# Patient Record
Sex: MALE | Race: WHITE | NOT HISPANIC OR LATINO | Employment: UNEMPLOYED | URBAN - METROPOLITAN AREA
[De-identification: names, ages, dates, MRNs, and addresses within clinical notes are randomized per-mention and may not be internally consistent; named-entity substitution may affect disease eponyms.]

---

## 2021-05-24 ENCOUNTER — ANESTHESIA (OUTPATIENT)
Dept: SURGERY | Facility: MEDICAL CENTER | Age: 51
DRG: 003 | End: 2021-05-24
Payer: MEDICAID

## 2021-05-24 ENCOUNTER — APPOINTMENT (OUTPATIENT)
Dept: RADIOLOGY | Facility: MEDICAL CENTER | Age: 51
DRG: 003 | End: 2021-05-24
Attending: EMERGENCY MEDICINE
Payer: MEDICAID

## 2021-05-24 ENCOUNTER — ANESTHESIA EVENT (OUTPATIENT)
Dept: SURGERY | Facility: MEDICAL CENTER | Age: 51
DRG: 003 | End: 2021-05-24
Payer: MEDICAID

## 2021-05-24 ENCOUNTER — HOSPITAL ENCOUNTER (INPATIENT)
Facility: MEDICAL CENTER | Age: 51
LOS: 38 days | DRG: 003 | End: 2021-07-01
Attending: EMERGENCY MEDICINE | Admitting: SURGERY
Payer: MEDICAID

## 2021-05-24 DIAGNOSIS — R13.12 OROPHARYNGEAL DYSPHAGIA: ICD-10-CM

## 2021-05-24 DIAGNOSIS — S02.92XB: ICD-10-CM

## 2021-05-24 DIAGNOSIS — S01.83XA GUNSHOT WOUND OF FACE, INITIAL ENCOUNTER: ICD-10-CM

## 2021-05-24 PROBLEM — J96.90 RESPIRATORY FAILURE FOLLOWING TRAUMA (HCC): Status: ACTIVE | Noted: 2021-05-24

## 2021-05-24 PROBLEM — R58 HYPOTENSION DUE TO BLOOD LOSS: Status: ACTIVE | Noted: 2021-05-24

## 2021-05-24 PROBLEM — Z53.09 CONTRAINDICATION TO DEEP VEIN THROMBOSIS (DVT) PROPHYLAXIS: Status: ACTIVE | Noted: 2021-05-24

## 2021-05-24 PROBLEM — T14.90XA TRAUMA: Status: ACTIVE | Noted: 2021-05-24

## 2021-05-24 PROBLEM — I95.89 HYPOTENSION DUE TO BLOOD LOSS: Status: ACTIVE | Noted: 2021-05-24
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
## 2021-05-24 PROBLEM — F10.929 ACUTE ALCOHOL INTOXICATION (HCC): Status: ACTIVE | Noted: 2021-05-24

## 2021-05-24 PROBLEM — R79.89 ELEVATED LACTIC ACID LEVEL: Status: ACTIVE | Noted: 2021-05-24

## 2021-05-24 PROBLEM — Z11.9 SCREENING EXAMINATION FOR INFECTIOUS DISEASE: Status: ACTIVE | Noted: 2021-05-24

## 2021-05-24 LAB
ABO + RH BLD: NORMAL
ABO GROUP BLD: NORMAL
ALBUMIN SERPL BCP-MCNC: 3.5 G/DL (ref 3.2–4.9)
ALBUMIN/GLOB SERPL: 1.7 G/DL
ALP SERPL-CCNC: 57 U/L (ref 30–99)
ALT SERPL-CCNC: 25 U/L (ref 2–50)
ANION GAP SERPL CALC-SCNC: 19 MMOL/L (ref 7–16)
APTT PPP: 23.7 SEC (ref 24.7–36)
AST SERPL-CCNC: 77 U/L (ref 12–45)
BARCODED ABORH UBTYP: 2800
BARCODED ABORH UBTYP: 2800
BARCODED ABORH UBTYP: 5100
BARCODED ABORH UBTYP: 6200
BARCODED ABORH UBTYP: 8400
BARCODED ABORH UBTYP: 8400
BARCODED PRD CODE UBPRD: NORMAL
BARCODED UNIT NUM UBUNT: NORMAL
BASE EXCESS BLDA CALC-SCNC: -10 MMOL/L (ref -4–3)
BASE EXCESS BLDA CALC-SCNC: -15 MMOL/L (ref -4–3)
BILIRUB SERPL-MCNC: 0.7 MG/DL (ref 0.1–1.5)
BLD GP AB SCN SERPL QL: NORMAL
BODY TEMPERATURE: ABNORMAL CENTIGRADE
BODY TEMPERATURE: ABNORMAL DEGREES
BREATHS SETTING VENT: 24
BUN SERPL-MCNC: 10 MG/DL (ref 8–22)
CA-I BLD ISE-SCNC: 1.07 MMOL/L (ref 1.1–1.3)
CALCIUM SERPL-MCNC: 8.1 MG/DL (ref 8.5–10.5)
CFT BLD TEG: 4.2 MIN (ref 5–10)
CHLORIDE SERPL-SCNC: 100 MMOL/L (ref 96–112)
CLOT ANGLE BLD TEG: 60.7 DEGREES (ref 53–72)
CLOT LYSIS 30M P MA LENFR BLD TEG: 0 % (ref 0–8)
CO2 BLDA-SCNC: 18 MMOL/L (ref 20–33)
CO2 SERPL-SCNC: 20 MMOL/L (ref 20–33)
COMPONENT F 8504F: NORMAL
COMPONENT P 8504P: NORMAL
COMPONENT R 8504R: NORMAL
CREAT SERPL-MCNC: 1.7 MG/DL (ref 0.5–1.4)
CT.EXTRINSIC BLD ROTEM: 2.4 MIN (ref 1–3)
DELSYS IDSYS: ABNORMAL
END TIDAL CARBON DIOXIDE IECO2: 30 MMHG
ERYTHROCYTE [DISTWIDTH] IN BLOOD BY AUTOMATED COUNT: 49.2 FL (ref 35.9–50)
ETHANOL BLD-MCNC: 235.6 MG/DL (ref 0–10)
GLOBULIN SER CALC-MCNC: 2.1 G/DL (ref 1.9–3.5)
GLUCOSE BLD-MCNC: 119 MG/DL (ref 65–99)
GLUCOSE BLD-MCNC: 120 MG/DL (ref 65–99)
GLUCOSE SERPL-MCNC: 260 MG/DL (ref 65–99)
HCO3 BLDA-SCNC: 13 MMOL/L (ref 17–25)
HCO3 BLDA-SCNC: 16.5 MMOL/L (ref 17–25)
HCT VFR BLD AUTO: 42.6 % (ref 42–52)
HCT VFR BLD CALC: 44 % (ref 42–52)
HGB BLD-MCNC: 13.7 G/DL (ref 14–18)
HGB BLD-MCNC: 15 G/DL (ref 14–18)
HOROWITZ INDEX BLDA+IHG-RTO: 240 MM[HG]
INR PPP: 1.09 (ref 0.87–1.13)
LACTATE BLD-SCNC: 12.3 MMOL/L (ref 0.5–2)
LACTATE BLD-SCNC: 4.7 MMOL/L (ref 0.5–2)
MCF BLD TEG: 60.5 MM (ref 50–70)
MCH RBC QN AUTO: 32.4 PG (ref 27–33)
MCHC RBC AUTO-ENTMCNC: 32.2 G/DL (ref 33.7–35.3)
MCV RBC AUTO: 100.7 FL (ref 81.4–97.8)
MODE IMODE: ABNORMAL
O2/TOTAL GAS SETTING VFR VENT: 50 %
PA AA BLD-ACNC: 12.4 %
PA ADP BLD-ACNC: 88.8 %
PCO2 BLDA: 35.7 MMHG (ref 26–37)
PCO2 BLDA: 38.1 MMHG (ref 26–37)
PCO2 TEMP ADJ BLDA: 35.2 MMHG (ref 26–37)
PEEP END EXPIRATORY PRESSURE IPEEP: 10 CMH20
PH BLDA: 7.17 [PH] (ref 7.4–7.5)
PH BLDA: 7.25 [PH] (ref 7.4–7.5)
PH TEMP ADJ BLDA: 7.27 [PH] (ref 7.4–7.5)
PLATELET # BLD AUTO: 247 K/UL (ref 164–446)
PMV BLD AUTO: 10.4 FL (ref 9–12.9)
PO2 BLDA: 120 MMHG (ref 64–87)
PO2 BLDA: 378.7 MMHG (ref 64–87)
PO2 TEMP ADJ BLDA: 110 MMHG (ref 64–87)
POTASSIUM BLD-SCNC: 4 MMOL/L (ref 3.6–5.5)
POTASSIUM SERPL-SCNC: 3.3 MMOL/L (ref 3.6–5.5)
PRODUCT TYPE UPROD: NORMAL
PROT SERPL-MCNC: 5.6 G/DL (ref 6–8.2)
PROTHROMBIN TIME: 14.5 SEC (ref 12–14.6)
RBC # BLD AUTO: 4.23 M/UL (ref 4.7–6.1)
RH BLD: NORMAL
SAO2 % BLDA: 98 % (ref 93–99)
SAO2 % BLDA: 98.5 % (ref 93–99)
SODIUM BLD-SCNC: 141 MMOL/L (ref 135–145)
SODIUM SERPL-SCNC: 139 MMOL/L (ref 135–145)
SPECIMEN DRAWN FROM PATIENT: ABNORMAL
TEG ALGORITHM TGALG: ABNORMAL
TIDAL VOLUME IVT: 500 ML
UNIT STATUS USTAT: NORMAL
WBC # BLD AUTO: 29.5 K/UL (ref 4.8–10.8)

## 2021-05-24 PROCEDURE — 85018 HEMOGLOBIN: CPT

## 2021-05-24 PROCEDURE — 0B113F4 BYPASS TRACHEA TO CUTANEOUS WITH TRACHEOSTOMY DEVICE, PERCUTANEOUS APPROACH: ICD-10-PCS | Performed by: SURGERY

## 2021-05-24 PROCEDURE — P9016 RBC LEUKOCYTES REDUCED: HCPCS

## 2021-05-24 PROCEDURE — 160009 HCHG ANES TIME/MIN: Performed by: PLASTIC SURGERY

## 2021-05-24 PROCEDURE — 302214 INTUBATION BOX: Performed by: SURGERY

## 2021-05-24 PROCEDURE — 500371 HCHG DRAIN, BLAKE 10MM: Performed by: PLASTIC SURGERY

## 2021-05-24 PROCEDURE — 0NST04Z REPOSITION RIGHT MANDIBLE WITH INTERNAL FIXATION DEVICE, OPEN APPROACH: ICD-10-PCS | Performed by: PLASTIC SURGERY

## 2021-05-24 PROCEDURE — 92950 HEART/LUNG RESUSCITATION CPR: CPT

## 2021-05-24 PROCEDURE — 31603 EMER TRACHEOSTOMY TTRACH: CPT

## 2021-05-24 PROCEDURE — 36430 TRANSFUSION BLD/BLD COMPNT: CPT

## 2021-05-24 PROCEDURE — 0NSV04Z REPOSITION LEFT MANDIBLE WITH INTERNAL FIXATION DEVICE, OPEN APPROACH: ICD-10-PCS | Performed by: PLASTIC SURGERY

## 2021-05-24 PROCEDURE — 700105 HCHG RX REV CODE 258: Performed by: EMERGENCY MEDICINE

## 2021-05-24 PROCEDURE — 501838 HCHG SUTURE GENERAL: Performed by: PLASTIC SURGERY

## 2021-05-24 PROCEDURE — 70498 CT ANGIOGRAPHY NECK: CPT

## 2021-05-24 PROCEDURE — 84132 ASSAY OF SERUM POTASSIUM: CPT

## 2021-05-24 PROCEDURE — 700111 HCHG RX REV CODE 636 W/ 250 OVERRIDE (IP): Performed by: SURGERY

## 2021-05-24 PROCEDURE — 160048 HCHG OR STATISTICAL LEVEL 1-5: Performed by: PLASTIC SURGERY

## 2021-05-24 PROCEDURE — 94003 VENT MGMT INPAT SUBQ DAY: CPT

## 2021-05-24 PROCEDURE — 83605 ASSAY OF LACTIC ACID: CPT

## 2021-05-24 PROCEDURE — 86900 BLOOD TYPING SEROLOGIC ABO: CPT

## 2021-05-24 PROCEDURE — 30233K1 TRANSFUSION OF NONAUTOLOGOUS FROZEN PLASMA INTO PERIPHERAL VEIN, PERCUTANEOUS APPROACH: ICD-10-PCS | Performed by: SURGERY

## 2021-05-24 PROCEDURE — 86850 RBC ANTIBODY SCREEN: CPT

## 2021-05-24 PROCEDURE — 700111 HCHG RX REV CODE 636 W/ 250 OVERRIDE (IP)

## 2021-05-24 PROCEDURE — 72125 CT NECK SPINE W/O DYE: CPT

## 2021-05-24 PROCEDURE — 82803 BLOOD GASES ANY COMBINATION: CPT

## 2021-05-24 PROCEDURE — 86901 BLOOD TYPING SEROLOGIC RH(D): CPT

## 2021-05-24 PROCEDURE — 770022 HCHG ROOM/CARE - ICU (200)

## 2021-05-24 PROCEDURE — G0390 TRAUMA RESPONS W/HOSP CRITI: HCPCS

## 2021-05-24 PROCEDURE — 85384 FIBRINOGEN ACTIVITY: CPT

## 2021-05-24 PROCEDURE — 5A1955Z RESPIRATORY VENTILATION, GREATER THAN 96 CONSECUTIVE HOURS: ICD-10-PCS | Performed by: SURGERY

## 2021-05-24 PROCEDURE — U0005 INFEC AGEN DETEC AMPLI PROBE: HCPCS

## 2021-05-24 PROCEDURE — 501835 HCHG SUTURE PLASTIC: Performed by: PLASTIC SURGERY

## 2021-05-24 PROCEDURE — 700105 HCHG RX REV CODE 258: Performed by: SURGERY

## 2021-05-24 PROCEDURE — 70450 CT HEAD/BRAIN W/O DYE: CPT

## 2021-05-24 PROCEDURE — 80053 COMPREHEN METABOLIC PANEL: CPT

## 2021-05-24 PROCEDURE — 90471 IMMUNIZATION ADMIN: CPT

## 2021-05-24 PROCEDURE — 3E0234Z INTRODUCTION OF SERUM, TOXOID AND VACCINE INTO MUSCLE, PERCUTANEOUS APPROACH: ICD-10-PCS | Performed by: SURGERY

## 2021-05-24 PROCEDURE — 70486 CT MAXILLOFACIAL W/O DYE: CPT

## 2021-05-24 PROCEDURE — P9017 PLASMA 1 DONOR FRZ W/IN 8 HR: HCPCS

## 2021-05-24 PROCEDURE — 82077 ASSAY SPEC XCP UR&BREATH IA: CPT

## 2021-05-24 PROCEDURE — 84295 ASSAY OF SERUM SODIUM: CPT

## 2021-05-24 PROCEDURE — 82330 ASSAY OF CALCIUM: CPT

## 2021-05-24 PROCEDURE — 700101 HCHG RX REV CODE 250: Performed by: EMERGENCY MEDICINE

## 2021-05-24 PROCEDURE — 90715 TDAP VACCINE 7 YRS/> IM: CPT

## 2021-05-24 PROCEDURE — 160027 HCHG SURGERY MINUTES - 1ST 30 MINS LEVEL 2: Performed by: PLASTIC SURGERY

## 2021-05-24 PROCEDURE — 94002 VENT MGMT INPAT INIT DAY: CPT

## 2021-05-24 PROCEDURE — 85610 PROTHROMBIN TIME: CPT

## 2021-05-24 PROCEDURE — 85027 COMPLETE CBC AUTOMATED: CPT

## 2021-05-24 PROCEDURE — 700117 HCHG RX CONTRAST REV CODE 255: Performed by: EMERGENCY MEDICINE

## 2021-05-24 PROCEDURE — 71045 X-RAY EXAM CHEST 1 VIEW: CPT

## 2021-05-24 PROCEDURE — 82962 GLUCOSE BLOOD TEST: CPT | Mod: 91

## 2021-05-24 PROCEDURE — 160038 HCHG SURGERY MINUTES - EA ADDL 1 MIN LEVEL 2: Performed by: PLASTIC SURGERY

## 2021-05-24 PROCEDURE — 0NCQ0ZZ EXTIRPATION OF MATTER FROM LEFT ORBIT, OPEN APPROACH: ICD-10-PCS | Performed by: PLASTIC SURGERY

## 2021-05-24 PROCEDURE — 85014 HEMATOCRIT: CPT

## 2021-05-24 PROCEDURE — 85730 THROMBOPLASTIN TIME PARTIAL: CPT

## 2021-05-24 PROCEDURE — 96375 TX/PRO/DX INJ NEW DRUG ADDON: CPT

## 2021-05-24 PROCEDURE — 85576 BLOOD PLATELET AGGREGATION: CPT | Mod: 91

## 2021-05-24 PROCEDURE — 700105 HCHG RX REV CODE 258: Performed by: STUDENT IN AN ORGANIZED HEALTH CARE EDUCATION/TRAINING PROGRAM

## 2021-05-24 PROCEDURE — 700111 HCHG RX REV CODE 636 W/ 250 OVERRIDE (IP): Performed by: STUDENT IN AN ORGANIZED HEALTH CARE EDUCATION/TRAINING PROGRAM

## 2021-05-24 PROCEDURE — 30233R1 TRANSFUSION OF NONAUTOLOGOUS PLATELETS INTO PERIPHERAL VEIN, PERCUTANEOUS APPROACH: ICD-10-PCS | Performed by: SURGERY

## 2021-05-24 PROCEDURE — 99291 CRITICAL CARE FIRST HOUR: CPT

## 2021-05-24 PROCEDURE — 37799 UNLISTED PX VASCULAR SURGERY: CPT

## 2021-05-24 PROCEDURE — P9034 PLATELETS, PHERESIS: HCPCS

## 2021-05-24 PROCEDURE — 30233N1 TRANSFUSION OF NONAUTOLOGOUS RED BLOOD CELLS INTO PERIPHERAL VEIN, PERCUTANEOUS APPROACH: ICD-10-PCS | Performed by: SURGERY

## 2021-05-24 PROCEDURE — U0003 INFECTIOUS AGENT DETECTION BY NUCLEIC ACID (DNA OR RNA); SEVERE ACUTE RESPIRATORY SYNDROME CORONAVIRUS 2 (SARS-COV-2) (CORONAVIRUS DISEASE [COVID-19]), AMPLIFIED PROBE TECHNIQUE, MAKING USE OF HIGH THROUGHPUT TECHNOLOGIES AS DESCRIBED BY CMS-2020-01-R: HCPCS

## 2021-05-24 PROCEDURE — 96374 THER/PROPH/DIAG INJ IV PUSH: CPT

## 2021-05-24 PROCEDURE — C1713 ANCHOR/SCREW BN/BN,TIS/BN: HCPCS | Performed by: PLASTIC SURGERY

## 2021-05-24 PROCEDURE — 85347 COAGULATION TIME ACTIVATED: CPT

## 2021-05-24 PROCEDURE — 306637 HCHG MISC ORTHO ITEM RC 0274

## 2021-05-24 DEVICE — IMPLANTABLE DEVICE: Type: IMPLANTABLE DEVICE | Site: FACE | Status: FUNCTIONAL

## 2021-05-24 RX ORDER — ENEMA 19; 7 G/133ML; G/133ML
1 ENEMA RECTAL
Status: DISCONTINUED | OUTPATIENT
Start: 2021-05-24 | End: 2021-06-23

## 2021-05-24 RX ORDER — FAMOTIDINE 20 MG/1
20 TABLET, FILM COATED ORAL 2 TIMES DAILY
Status: DISCONTINUED | OUTPATIENT
Start: 2021-05-24 | End: 2021-05-31

## 2021-05-24 RX ORDER — OXYCODONE HYDROCHLORIDE 10 MG/1
10 TABLET ORAL
Status: DISCONTINUED | OUTPATIENT
Start: 2021-05-24 | End: 2021-06-27

## 2021-05-24 RX ORDER — SODIUM CHLORIDE, SODIUM LACTATE, POTASSIUM CHLORIDE, CALCIUM CHLORIDE 600; 310; 30; 20 MG/100ML; MG/100ML; MG/100ML; MG/100ML
INJECTION, SOLUTION INTRAVENOUS CONTINUOUS
Status: DISCONTINUED | OUTPATIENT
Start: 2021-05-24 | End: 2021-05-29

## 2021-05-24 RX ORDER — AMPICILLIN AND SULBACTAM 2; 1 G/1; G/1
INJECTION, POWDER, FOR SOLUTION INTRAMUSCULAR; INTRAVENOUS
Status: DISPENSED
Start: 2021-05-24 | End: 2021-05-25

## 2021-05-24 RX ORDER — ONDANSETRON 2 MG/ML
4 INJECTION INTRAMUSCULAR; INTRAVENOUS EVERY 4 HOURS PRN
Status: DISCONTINUED | OUTPATIENT
Start: 2021-05-24 | End: 2021-06-23

## 2021-05-24 RX ORDER — BISACODYL 10 MG
10 SUPPOSITORY, RECTAL RECTAL
Status: DISCONTINUED | OUTPATIENT
Start: 2021-05-24 | End: 2021-06-23

## 2021-05-24 RX ORDER — KETAMINE HYDROCHLORIDE 50 MG/ML
INJECTION, SOLUTION INTRAMUSCULAR; INTRAVENOUS
Status: COMPLETED | OUTPATIENT
Start: 2021-05-24 | End: 2021-05-24

## 2021-05-24 RX ORDER — FAMOTIDINE 20 MG/1
20 TABLET, FILM COATED ORAL 2 TIMES DAILY
Status: DISCONTINUED | OUTPATIENT
Start: 2021-05-24 | End: 2021-05-24

## 2021-05-24 RX ORDER — SODIUM CHLORIDE 9 MG/ML
INJECTION, SOLUTION INTRAVENOUS
Status: DISPENSED
Start: 2021-05-24 | End: 2021-05-25

## 2021-05-24 RX ORDER — AMOXICILLIN 250 MG
1 CAPSULE ORAL
Status: DISCONTINUED | OUTPATIENT
Start: 2021-05-24 | End: 2021-06-23

## 2021-05-24 RX ORDER — MIDAZOLAM HYDROCHLORIDE 1 MG/ML
INJECTION INTRAMUSCULAR; INTRAVENOUS PRN
Status: DISCONTINUED | OUTPATIENT
Start: 2021-05-24 | End: 2021-05-24 | Stop reason: SURG

## 2021-05-24 RX ORDER — POLYETHYLENE GLYCOL 3350 17 G/17G
1 POWDER, FOR SOLUTION ORAL 2 TIMES DAILY
Status: DISCONTINUED | OUTPATIENT
Start: 2021-05-24 | End: 2021-07-01 | Stop reason: HOSPADM

## 2021-05-24 RX ORDER — DEXAMETHASONE SODIUM PHOSPHATE 4 MG/ML
INJECTION, SOLUTION INTRA-ARTICULAR; INTRALESIONAL; INTRAMUSCULAR; INTRAVENOUS; SOFT TISSUE PRN
Status: DISCONTINUED | OUTPATIENT
Start: 2021-05-24 | End: 2021-05-24 | Stop reason: SURG

## 2021-05-24 RX ORDER — VECURONIUM BROMIDE 1 MG/ML
INJECTION, POWDER, LYOPHILIZED, FOR SOLUTION INTRAVENOUS
Status: COMPLETED | OUTPATIENT
Start: 2021-05-24 | End: 2021-05-24

## 2021-05-24 RX ORDER — CEFAZOLIN SODIUM 1 G/3ML
INJECTION, POWDER, FOR SOLUTION INTRAMUSCULAR; INTRAVENOUS PRN
Status: DISCONTINUED | OUTPATIENT
Start: 2021-05-24 | End: 2021-05-24 | Stop reason: SURG

## 2021-05-24 RX ORDER — AMOXICILLIN 250 MG
1 CAPSULE ORAL NIGHTLY
Status: DISCONTINUED | OUTPATIENT
Start: 2021-05-24 | End: 2021-07-01 | Stop reason: HOSPADM

## 2021-05-24 RX ORDER — SODIUM CHLORIDE 9 MG/ML
INJECTION, SOLUTION INTRAVENOUS
Status: COMPLETED | OUTPATIENT
Start: 2021-05-24 | End: 2021-05-24

## 2021-05-24 RX ORDER — OXYCODONE HYDROCHLORIDE 5 MG/1
5 TABLET ORAL
Status: DISCONTINUED | OUTPATIENT
Start: 2021-05-24 | End: 2021-06-27

## 2021-05-24 RX ORDER — MORPHINE SULFATE 4 MG/ML
4 INJECTION, SOLUTION INTRAMUSCULAR; INTRAVENOUS
Status: DISCONTINUED | OUTPATIENT
Start: 2021-05-24 | End: 2021-05-31

## 2021-05-24 RX ORDER — PHENYLEPHRINE HYDROCHLORIDE 10 MG/ML
INJECTION, SOLUTION INTRAMUSCULAR; INTRAVENOUS; SUBCUTANEOUS PRN
Status: DISCONTINUED | OUTPATIENT
Start: 2021-05-24 | End: 2021-05-24 | Stop reason: SURG

## 2021-05-24 RX ORDER — DOCUSATE SODIUM 50 MG/5ML
100 LIQUID ORAL 2 TIMES DAILY
Status: DISCONTINUED | OUTPATIENT
Start: 2021-05-25 | End: 2021-07-01 | Stop reason: HOSPADM

## 2021-05-24 RX ORDER — DOCUSATE SODIUM 100 MG/1
100 CAPSULE, LIQUID FILLED ORAL 2 TIMES DAILY
Status: DISCONTINUED | OUTPATIENT
Start: 2021-05-24 | End: 2021-05-24

## 2021-05-24 RX ADMIN — SODIUM CHLORIDE 1000 ML: 9 INJECTION, SOLUTION INTRAVENOUS at 17:32

## 2021-05-24 RX ADMIN — SODIUM CHLORIDE, POTASSIUM CHLORIDE, SODIUM LACTATE AND CALCIUM CHLORIDE: 600; 310; 30; 20 INJECTION, SOLUTION INTRAVENOUS at 20:07

## 2021-05-24 RX ADMIN — KETAMINE HYDROCHLORIDE 100 MG: 50 INJECTION INTRAMUSCULAR; INTRAVENOUS at 17:27

## 2021-05-24 RX ADMIN — CLOSTRIDIUM TETANI TOXOID ANTIGEN (FORMALDEHYDE INACTIVATED), CORYNEBACTERIUM DIPHTHERIAE TOXOID ANTIGEN (FORMALDEHYDE INACTIVATED), BORDETELLA PERTUSSIS TOXOID ANTIGEN (GLUTARALDEHYDE INACTIVATED), BORDETELLA PERTUSSIS FILAMENTOUS HEMAGGLUTININ ANTIGEN (FORMALDEHYDE INACTIVATED), BORDETELLA PERTUSSIS PERTACTIN ANTIGEN, AND BORDETELLA PERTUSSIS FIMBRIAE 2/3 ANTIGEN 1 ML: 5; 2; 2.5; 5; 3; 5 INJECTION, SUSPENSION INTRAMUSCULAR at 17:50

## 2021-05-24 RX ADMIN — IOHEXOL 80 ML: 350 INJECTION, SOLUTION INTRAVENOUS at 18:03

## 2021-05-24 RX ADMIN — CEFAZOLIN 2 G: 330 INJECTION, POWDER, FOR SOLUTION INTRAMUSCULAR; INTRAVENOUS at 18:40

## 2021-05-24 RX ADMIN — MIDAZOLAM HYDROCHLORIDE 2 MG: 1 INJECTION, SOLUTION INTRAMUSCULAR; INTRAVENOUS at 18:38

## 2021-05-24 RX ADMIN — SODIUM CHLORIDE 3 G: 900 INJECTION INTRAVENOUS at 17:42

## 2021-05-24 RX ADMIN — DEXAMETHASONE SODIUM PHOSPHATE 10 MG: 4 INJECTION, SOLUTION INTRA-ARTICULAR; INTRALESIONAL; INTRAMUSCULAR; INTRAVENOUS; SOFT TISSUE at 18:40

## 2021-05-24 RX ADMIN — SODIUM CHLORIDE, POTASSIUM CHLORIDE, SODIUM LACTATE AND CALCIUM CHLORIDE: 600; 310; 30; 20 INJECTION, SOLUTION INTRAVENOUS at 18:42

## 2021-05-24 RX ADMIN — PHENYLEPHRINE HYDROCHLORIDE 25 MCG/MIN: 10 INJECTION INTRAVENOUS at 18:54

## 2021-05-24 RX ADMIN — FENTANYL CITRATE 100 MCG: 50 INJECTION, SOLUTION INTRAMUSCULAR; INTRAVENOUS at 18:40

## 2021-05-24 RX ADMIN — PHENYLEPHRINE HYDROCHLORIDE 200 MCG: 10 INJECTION INTRAVENOUS at 18:54

## 2021-05-24 RX ADMIN — PROPOFOL 10 MCG/KG/MIN: 10 INJECTION, EMULSION INTRAVENOUS at 21:40

## 2021-05-24 RX ADMIN — KETAMINE HYDROCHLORIDE 100 MG: 50 INJECTION INTRAMUSCULAR; INTRAVENOUS at 17:35

## 2021-05-24 RX ADMIN — FAMOTIDINE 20 MG: 10 INJECTION INTRAVENOUS at 21:29

## 2021-05-24 RX ADMIN — VECURONIUM BROMIDE 2 MG: 10 INJECTION, POWDER, LYOPHILIZED, FOR SOLUTION INTRAVENOUS at 17:31

## 2021-05-24 ASSESSMENT — PAIN SCALES - GENERAL: PAIN_LEVEL: 0

## 2021-05-24 ASSESSMENT — FIBROSIS 4 INDEX: FIB4 SCORE: 7.54

## 2021-05-25 ENCOUNTER — APPOINTMENT (OUTPATIENT)
Dept: RADIOLOGY | Facility: MEDICAL CENTER | Age: 51
DRG: 003 | End: 2021-05-25
Attending: SURGERY
Payer: MEDICAID

## 2021-05-25 LAB
ALBUMIN SERPL BCP-MCNC: 2.9 G/DL (ref 3.2–4.9)
ALBUMIN/GLOB SERPL: 1.3 G/DL
ALP SERPL-CCNC: 42 U/L (ref 30–99)
ALT SERPL-CCNC: 27 U/L (ref 2–50)
ANION GAP SERPL CALC-SCNC: 12 MMOL/L (ref 7–16)
ANISOCYTOSIS BLD QL SMEAR: ABNORMAL
AST SERPL-CCNC: 69 U/L (ref 12–45)
BASOPHILS # BLD AUTO: 0 % (ref 0–1.8)
BASOPHILS # BLD: 0 K/UL (ref 0–0.12)
BILIRUB SERPL-MCNC: 0.9 MG/DL (ref 0.1–1.5)
BUN SERPL-MCNC: 9 MG/DL (ref 8–22)
CALCIUM SERPL-MCNC: 7.4 MG/DL (ref 8.5–10.5)
CFT BLD TEG: 3.8 MIN (ref 5–10)
CHLORIDE SERPL-SCNC: 107 MMOL/L (ref 96–112)
CLOT ANGLE BLD TEG: 64 DEGREES (ref 53–72)
CLOT LYSIS 30M P MA LENFR BLD TEG: 0.1 % (ref 0–8)
CO2 SERPL-SCNC: 21 MMOL/L (ref 20–33)
CREAT SERPL-MCNC: 0.85 MG/DL (ref 0.5–1.4)
CT.EXTRINSIC BLD ROTEM: 2.2 MIN (ref 1–3)
EOSINOPHIL # BLD AUTO: 0 K/UL (ref 0–0.51)
EOSINOPHIL NFR BLD: 0 % (ref 0–6.9)
ERYTHROCYTE [DISTWIDTH] IN BLOOD BY AUTOMATED COUNT: 50.8 FL (ref 35.9–50)
GLOBULIN SER CALC-MCNC: 2.2 G/DL (ref 1.9–3.5)
GLUCOSE BLD-MCNC: 111 MG/DL (ref 65–99)
GLUCOSE BLD-MCNC: 113 MG/DL (ref 65–99)
GLUCOSE SERPL-MCNC: 138 MG/DL (ref 65–99)
HCT VFR BLD AUTO: 39.5 % (ref 42–52)
HGB BLD-MCNC: 12.4 G/DL (ref 14–18)
HGB BLD-MCNC: 13.3 G/DL (ref 14–18)
HGB BLD-MCNC: 14.7 G/DL (ref 14–18)
LACTATE BLD-SCNC: 1.6 MMOL/L (ref 0.5–2)
LACTATE BLD-SCNC: 1.9 MMOL/L (ref 0.5–2)
LACTATE BLD-SCNC: 2 MMOL/L (ref 0.5–2)
LACTATE BLD-SCNC: 2.3 MMOL/L (ref 0.5–2)
LG PLATELETS BLD QL SMEAR: NORMAL
LYMPHOCYTES # BLD AUTO: 0.34 K/UL (ref 1–4.8)
LYMPHOCYTES NFR BLD: 1.7 % (ref 22–41)
MANUAL DIFF BLD: NORMAL
MCF BLD TEG: 57.5 MM (ref 50–70)
MCH RBC QN AUTO: 30.7 PG (ref 27–33)
MCHC RBC AUTO-ENTMCNC: 33.7 G/DL (ref 33.7–35.3)
MCV RBC AUTO: 91.2 FL (ref 81.4–97.8)
MONOCYTES # BLD AUTO: 1.92 K/UL (ref 0–0.85)
MONOCYTES NFR BLD AUTO: 9.6 % (ref 0–13.4)
MORPHOLOGY BLD-IMP: NORMAL
NEUTROPHILS # BLD AUTO: 17.74 K/UL (ref 1.82–7.42)
NEUTROPHILS NFR BLD: 80 % (ref 44–72)
NEUTS BAND NFR BLD MANUAL: 8.7 % (ref 0–10)
NRBC # BLD AUTO: 0 K/UL
NRBC BLD-RTO: 0 /100 WBC
PA AA BLD-ACNC: 0 %
PA ADP BLD-ACNC: 31 %
PLATELET # BLD AUTO: 154 K/UL (ref 164–446)
PLATELET BLD QL SMEAR: NORMAL
PMV BLD AUTO: 10.8 FL (ref 9–12.9)
POTASSIUM SERPL-SCNC: 4.8 MMOL/L (ref 3.6–5.5)
PROT SERPL-MCNC: 5.1 G/DL (ref 6–8.2)
RBC # BLD AUTO: 4.33 M/UL (ref 4.7–6.1)
RBC BLD AUTO: PRESENT
SARS-COV-2 RNA RESP QL NAA+PROBE: NOTDETECTED
SODIUM SERPL-SCNC: 140 MMOL/L (ref 135–145)
SPECIMEN SOURCE: NORMAL
TEG ALGORITHM TGALG: ABNORMAL
WBC # BLD AUTO: 20 K/UL (ref 4.8–10.8)

## 2021-05-25 PROCEDURE — 85384 FIBRINOGEN ACTIVITY: CPT

## 2021-05-25 PROCEDURE — 83605 ASSAY OF LACTIC ACID: CPT | Mod: 91

## 2021-05-25 PROCEDURE — 85018 HEMOGLOBIN: CPT

## 2021-05-25 PROCEDURE — 94150 VITAL CAPACITY TEST: CPT

## 2021-05-25 PROCEDURE — 700111 HCHG RX REV CODE 636 W/ 250 OVERRIDE (IP): Performed by: SURGERY

## 2021-05-25 PROCEDURE — 82962 GLUCOSE BLOOD TEST: CPT | Mod: 91

## 2021-05-25 PROCEDURE — 85347 COAGULATION TIME ACTIVATED: CPT

## 2021-05-25 PROCEDURE — 94799 UNLISTED PULMONARY SVC/PX: CPT

## 2021-05-25 PROCEDURE — 770022 HCHG ROOM/CARE - ICU (200)

## 2021-05-25 PROCEDURE — 80053 COMPREHEN METABOLIC PANEL: CPT

## 2021-05-25 PROCEDURE — 85007 BL SMEAR W/DIFF WBC COUNT: CPT

## 2021-05-25 PROCEDURE — 85576 BLOOD PLATELET AGGREGATION: CPT

## 2021-05-25 PROCEDURE — 85027 COMPLETE CBC AUTOMATED: CPT

## 2021-05-25 PROCEDURE — 700105 HCHG RX REV CODE 258: Performed by: SURGERY

## 2021-05-25 PROCEDURE — 94003 VENT MGMT INPAT SUBQ DAY: CPT

## 2021-05-25 PROCEDURE — 71045 X-RAY EXAM CHEST 1 VIEW: CPT

## 2021-05-25 PROCEDURE — 99291 CRITICAL CARE FIRST HOUR: CPT | Performed by: SURGERY

## 2021-05-25 RX ORDER — ENALAPRILAT 1.25 MG/ML
1.25 INJECTION INTRAVENOUS EVERY 6 HOURS PRN
Status: DISCONTINUED | OUTPATIENT
Start: 2021-05-25 | End: 2021-06-16

## 2021-05-25 RX ORDER — DEXTROSE MONOHYDRATE 25 G/50ML
50 INJECTION, SOLUTION INTRAVENOUS
Status: DISCONTINUED | OUTPATIENT
Start: 2021-05-25 | End: 2021-05-26

## 2021-05-25 RX ADMIN — AMPICILLIN SODIUM AND SULBACTAM SODIUM 3 G: 2; 1 INJECTION, POWDER, FOR SOLUTION INTRAMUSCULAR; INTRAVENOUS at 11:34

## 2021-05-25 RX ADMIN — AMPICILLIN SODIUM AND SULBACTAM SODIUM 3 G: 2; 1 INJECTION, POWDER, FOR SOLUTION INTRAMUSCULAR; INTRAVENOUS at 17:35

## 2021-05-25 RX ADMIN — SODIUM CHLORIDE, POTASSIUM CHLORIDE, SODIUM LACTATE AND CALCIUM CHLORIDE: 600; 310; 30; 20 INJECTION, SOLUTION INTRAVENOUS at 23:24

## 2021-05-25 RX ADMIN — FAMOTIDINE 20 MG: 10 INJECTION INTRAVENOUS at 06:00

## 2021-05-25 RX ADMIN — MORPHINE SULFATE 4 MG: 4 INJECTION INTRAVENOUS at 19:51

## 2021-05-25 RX ADMIN — SODIUM CHLORIDE, POTASSIUM CHLORIDE, SODIUM LACTATE AND CALCIUM CHLORIDE: 600; 310; 30; 20 INJECTION, SOLUTION INTRAVENOUS at 05:54

## 2021-05-25 RX ADMIN — PROPOFOL 50 MCG/KG/MIN: 10 INJECTION, EMULSION INTRAVENOUS at 01:37

## 2021-05-25 RX ADMIN — MORPHINE SULFATE 4 MG: 4 INJECTION INTRAVENOUS at 04:33

## 2021-05-25 RX ADMIN — PROPOFOL 15 MCG/KG/MIN: 10 INJECTION, EMULSION INTRAVENOUS at 14:47

## 2021-05-25 RX ADMIN — FAMOTIDINE 20 MG: 10 INJECTION INTRAVENOUS at 17:35

## 2021-05-25 RX ADMIN — AMPICILLIN SODIUM AND SULBACTAM SODIUM 3 G: 2; 1 INJECTION, POWDER, FOR SOLUTION INTRAMUSCULAR; INTRAVENOUS at 05:53

## 2021-05-25 RX ADMIN — AMPICILLIN SODIUM AND SULBACTAM SODIUM 3 G: 2; 1 INJECTION, POWDER, FOR SOLUTION INTRAMUSCULAR; INTRAVENOUS at 00:40

## 2021-05-25 RX ADMIN — PROPOFOL 50 MCG/KG/MIN: 10 INJECTION, EMULSION INTRAVENOUS at 05:54

## 2021-05-25 RX ADMIN — MORPHINE SULFATE 4 MG: 4 INJECTION INTRAVENOUS at 13:15

## 2021-05-25 RX ADMIN — ENALAPRILAT 1.25 MG: 1.25 INJECTION INTRAVENOUS at 23:24

## 2021-05-25 RX ADMIN — AMPICILLIN SODIUM AND SULBACTAM SODIUM 3 G: 2; 1 INJECTION, POWDER, FOR SOLUTION INTRAMUSCULAR; INTRAVENOUS at 23:24

## 2021-05-25 RX ADMIN — SODIUM CHLORIDE, POTASSIUM CHLORIDE, SODIUM LACTATE AND CALCIUM CHLORIDE: 600; 310; 30; 20 INJECTION, SOLUTION INTRAVENOUS at 14:49

## 2021-05-25 RX ADMIN — MORPHINE SULFATE 4 MG: 4 INJECTION INTRAVENOUS at 10:04

## 2021-05-25 RX ADMIN — MORPHINE SULFATE 4 MG: 4 INJECTION INTRAVENOUS at 16:37

## 2021-05-25 ASSESSMENT — PATIENT HEALTH QUESTIONNAIRE - PHQ9
SUM OF ALL RESPONSES TO PHQ9 QUESTIONS 1 AND 2: 3
1. LITTLE INTEREST OR PLEASURE IN DOING THINGS: NEARLY EVERY DAY

## 2021-05-25 ASSESSMENT — PAIN DESCRIPTION - PAIN TYPE
TYPE: ACUTE PAIN

## 2021-05-25 ASSESSMENT — PULMONARY FUNCTION TESTS
FVC: 1.5
FVC: 1.5

## 2021-05-25 ASSESSMENT — FIBROSIS 4 INDEX: FIB4 SCORE: 7.54
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
## 2021-05-25 NOTE — CARE PLAN
The patient is Watcher - Medium risk of patient condition declining or worsening    Shift Goals  Clinical Goals: hemodynamic stability  Patient Goals: TLC    Progress made toward(s) clinical / shift goals:  pain management, hemodynamic stability     Patient is not progressing towards the following goals: free of restraints       Problem: Safety - Medical Restraint  Goal: Free from restraint(s) (Restraint for Interference with Medical Device)  Outcome: Not Met       Problem: Knowledge Deficit - Standard  Goal: Patient and family/care givers will demonstrate understanding of plan of care, disease process/condition, diagnostic tests and medications  Outcome: Met     Problem: Pain - Standard  Goal: Alleviation of pain or a reduction in pain to the patient’s comfort goal  Outcome: Met     Problem: Safety - Medical Restraint  Goal: Remains free of injury from restraints (Restraint for Interference with Medical Device)  Outcome: Met  Goal: Free from restraint(s) (Restraint for Interference with Medical Device)  Outcome: Not Met

## 2021-05-25 NOTE — ASSESSMENT & PLAN NOTE
Prophylactic anticoagulation for thrombotic prevention initially contraindicated secondary to elevated bleeding risk.  RAP Score 4.  5/26 Trauma screening bilateral lower extremity venous duplex negative for above knee DVT.  5/29 Prophylactic dose enoxaparin initiated.

## 2021-05-25 NOTE — DISCHARGE PLANNING
Care Transition Team Assessment    LSW met with pt at bedside and obtained the following information used in this assessment. Patient's ex-boyfriend and best friend, Vadim, was present and assisted with answering some of LSW's questions. Verified accuracy of facesheet.      Patient is a single male with no children. Patient's family live out of state however they are involved with his care. Per RN, pt's mother, Delfina, is to arrive to [place] this evening. Patient has two sisters, Tanika and Saranya. Tanika lives in [place] and Saranya lives in [place]. Saranya's son, Kingston (pt's nephew) came and visited patient today. Kingston also lives in [place].The only local support is his Vadim and additional friends.     No ACP documents.   In case of an emergency, pt's mother is DEB.     Patient lives in a second level apartment alone. Prior to current hospitalization, pt was completely independent in ADLS/IADLS. Patient is unemployed and has been for over a year. Patient has no health insurance. LSW reached out to [facility] and notified. Pt is agreeable with being screened for [place] Medicaid. No PCP.     Legal hold paperwork given to bedside RN for completion per protocol/standard work. Patient able to participate with psych assessment.     Barriers to dc: Medically complex, mental health concerns/legal hold, substance abuse, no PCP, no insurance and limited financial resources & dc support.     Plan: Continue to assist with social/dc needs     Care Transition Team Assessment    Information Source  Orientation Level: Unable to assess  Information Given By: Friend  Informant's Name: Vadim  Who is responsible for making decisions for patient? : Patient    Readmission Evaluation  Is this a readmission?: No    Elopement Risk  Legal Hold: No  Ambulatory or Self Mobile in Wheelchair: No-Not an Elopement Risk  Elopement Risk: Not at Risk for Elopement    Interdisciplinary Discharge Planning  Primary Care Physician: none  Lives with  - Patient's Self Care Capacity: Alone and Able to Care For Self  Support Systems: Family Member(s), Friends / Neighbors  Housing / Facility: 2 Story Apartment / Condo  Prior Services: Home-Independent  Patient Prefers to be Discharged to:: Rehab vs IP psych  Assistance Needed: Unknown at this Time    Discharge Preparedness  What is your plan after discharge?: Uncertain - pending medical team collaboration  Prior Functional Level: Ambulatory, Independent with Activities of Daily Living, Independent with Medication Management    Functional Assesment  Prior Functional Level: Ambulatory, Independent with Activities of Daily Living, Independent with Medication Management    Finances  Financial Barriers to Discharge: Yes  Average Monthly Income:  (unknown)  Source of Income: None (Hasn't worked for over a year)  Prescription Coverage: No  Prescription Coverage Comments: No health insurance    Advance Directive  Advance Directive?: None  Advance Directive offered?: AD Booklet refused    Psychological Assessment  History of Substance Abuse: None  History of Psychiatric Problems: No  Non-compliant with Treatment: No  Newly Diagnosed Illness: Yes    Discharge Risks or Barriers  Discharge risks or barriers?: No PCP, Uninsured / underinsured, Mental health, Post-acute placement / services, Lives alone, no community support, Complex medical needs  Patient risk factors: Complex medical needs, Lack of outside supports, Mental health, No PCP, Substance abuse, Uninsured or underinsured    Anticipated Discharge Information  Discharge Disposition: Still a Patient
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
## 2021-05-25 NOTE — CARE PLAN
The patient is Watcher - Medium risk of patient condition declining or worsening    Shift Goals  Clinical Goals: hemodynamic stability  Patient Goals: TLC      Problem: Safety - Medical Restraint  Goal: Remains free of injury from restraints (Restraint for Interference with Medical Device)  Flowsheets (Taken 5/25/2021 0330)  Addressed this shift: Remains free of injury from restraints (restraint for interference with medical device):   Determine that other, less restrictive measures have been tried or would not be effective before applying the restraint   Evaluate the patient's condition at the time of restraint application   Inform patient/family regarding the reason for restraint   Every 2 hours: Monitor safety, psychosocial status, comfort, nutrition and hydration  Note: Soft wrist restraints in use for pulling lines.  Communicated increased patient needs with staff.  Patient educated about safety and fall precaution.  Educated about call light use.        Problem: Knowledge Deficit - Standard  Goal: Patient and family/care givers will demonstrate understanding of plan of care, disease process/condition, diagnostic tests and medications  Note: Reviewing plan of care, activities, and medication with patient & patient's family. Patient able to communicate appropriately nodding and writing on paper.  Encouraging patient and family to ask questions and participate in plan of care.  Providing answers to all questions.  Continuing with current plan of care.
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
## 2021-05-25 NOTE — CARE PLAN
Ventilator Daily Summary    Vent Day #2    Ventilator settings:  +8 40%    Plan: Continue current ventilator settings and wean mechanical ventilation as tolerated per physician orders.

## 2021-05-25 NOTE — DISCHARGE PLANNING
Medical Social Work    MSW received a call from bedside RN that pt's sister, Saranya Brewer just arrived from out of town and is in need of a Lodging Letter.  Letter and list tubed to RN; tube station 19.
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
## 2021-05-25 NOTE — ANESTHESIA PREPROCEDURE EVALUATION
Relevant Problems   No relevant active problems       Physical Exam    Airway   Mallampati: II  TM distance: >3 FB  Neck ROM: full       Cardiovascular - normal exam  Rhythm: regular  Rate: normal  (-) murmur     Dental - normal exam           Pulmonary - normal exam  Breath sounds clear to auscultation  Comments: trached   Abdominal    Neurological - normal exam                 Anesthesia Plan    ASA 3- EMERGENT   ASA physical status 3 criteria: hypertension - poorly controlledASA physical status emergent criteria: acute ischemia (limb, body part, tissue)    Plan - general               Induction: intravenous    Postoperative Plan: Postoperative administration of opioids is intended.    Pertinent diagnostic labs and testing reviewed    Informed Consent:    Anesthetic plan and risks discussed with patient.    Use of blood products discussed with: patient whom consented to blood products.

## 2021-05-25 NOTE — DISCHARGE PLANNING
LIYAH received call from pt's mother, Delfina Tovar [phone], who is aware of pt's current state. She is looking for a way to get to [place] from [place].
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
## 2021-05-25 NOTE — PROGRESS NOTES
Patient arrived to [facility] at 2050 accompanied by 2 OR RNs & anesthesiologist. Patient on transport monitor. ICU monitor placed.      2 RN skin assessment completed.   -Bruising to eyes  -Significant swelling to face  -Sutures in place to chin  -Small laceration to left check & MARY drain to R lateral neck   -Swelling & blanchable redness to trach site- RN cleansed & changed trach tie  -Missing teeth & scattered lacerations mouth and inside of upper and lower lips.- pictures taken and upload in epic, wound cx placed   -Calloused ft  -Abrasions to L shin      The following interventions in place: Q2H turns and repositioning, rotate pulse ox. Heels floated on pillows. Q2 turns in place. Assess under restraints Q2H. Ensure patient is not laying on tubing/lines. No redness or indications of pressure from devices or bony prominences. Mepilex in place. Low air loss mattress in use and mattress pressure appropriate for patient. TAPS in place.
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
## 2021-05-25 NOTE — ASSESSMENT & PLAN NOTE
Julianna trach in Emergency Department for airway protection.  Tolerating T-piece trial.  5/29 Capping trials initiated.  6/10 Downsize to #6 nonfenestrated cuffless Shiley tracheostomy tube. Placement of Speaking Valve.  6/11 Capped at 1500.  6/13 Tolerating capping trial.  6/15 Decannulated.  RESOLVED

## 2021-05-25 NOTE — OR NURSING
Foreign body was removed from surgical site by Dr. Okeefe, who then handed off to the surgical tech, which was then handed off the sterile field to the  on duty.

## 2021-05-25 NOTE — ASSESSMENT & PLAN NOTE
Transfused 2 units PRBC in Emergency Department   Red box given upon arrival to ICU   Hemodynamically stable after red box  RESOLVED
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
## 2021-05-25 NOTE — ANESTHESIA PROCEDURE NOTES
Arterial Line  Performed by: Mo Baez M.D.  Authorized by: Mo Baez M.D.     Start Time:  5/24/2021 6:40 PM  End Time:  5/24/2021 6:45 PM  Localization: surface landmarks    Patient Location:  OR  Indication: continuous blood pressure monitoring        Catheter Size:  20 G  Seldinger Technique?: Yes    Laterality:  Left  Site:  Radial artery  Line Secured:  Antimicrobial disc, tape and transparent dressing  Events: patient tolerated procedure well with no complications
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
## 2021-05-26 ENCOUNTER — APPOINTMENT (OUTPATIENT)
Dept: RADIOLOGY | Facility: MEDICAL CENTER | Age: 51
DRG: 003 | End: 2021-05-26
Attending: SURGERY
Payer: MEDICAID

## 2021-05-26 PROBLEM — I47.10 SVT (SUPRAVENTRICULAR TACHYCARDIA) (HCC): Status: ACTIVE | Noted: 2021-05-26

## 2021-05-26 LAB
ALBUMIN SERPL BCP-MCNC: 2.9 G/DL (ref 3.2–4.9)
ALBUMIN/GLOB SERPL: 1.2 G/DL
ALP SERPL-CCNC: 43 U/L (ref 30–99)
ALT SERPL-CCNC: 22 U/L (ref 2–50)
ANION GAP SERPL CALC-SCNC: 10 MMOL/L (ref 7–16)
ANION GAP SERPL CALC-SCNC: 7 MMOL/L (ref 7–16)
AST SERPL-CCNC: 52 U/L (ref 12–45)
BILIRUB SERPL-MCNC: 0.9 MG/DL (ref 0.1–1.5)
BUN SERPL-MCNC: 6 MG/DL (ref 8–22)
BUN SERPL-MCNC: 8 MG/DL (ref 8–22)
CALCIUM SERPL-MCNC: 7.8 MG/DL (ref 8.5–10.5)
CALCIUM SERPL-MCNC: 8.3 MG/DL (ref 8.5–10.5)
CHLORIDE SERPL-SCNC: 103 MMOL/L (ref 96–112)
CHLORIDE SERPL-SCNC: 105 MMOL/L (ref 96–112)
CO2 SERPL-SCNC: 28 MMOL/L (ref 20–33)
CO2 SERPL-SCNC: 28 MMOL/L (ref 20–33)
CREAT SERPL-MCNC: 0.61 MG/DL (ref 0.5–1.4)
CREAT SERPL-MCNC: 0.62 MG/DL (ref 0.5–1.4)
ERYTHROCYTE [DISTWIDTH] IN BLOOD BY AUTOMATED COUNT: 52.2 FL (ref 35.9–50)
GLOBULIN SER CALC-MCNC: 2.4 G/DL (ref 1.9–3.5)
GLUCOSE BLD-MCNC: 126 MG/DL (ref 65–99)
GLUCOSE SERPL-MCNC: 122 MG/DL (ref 65–99)
GLUCOSE SERPL-MCNC: 123 MG/DL (ref 65–99)
HCT VFR BLD AUTO: 33.7 % (ref 42–52)
HGB BLD-MCNC: 11.1 G/DL (ref 14–18)
LACTATE BLD-SCNC: 1.9 MMOL/L (ref 0.5–2)
MAGNESIUM SERPL-MCNC: 2.1 MG/DL (ref 1.5–2.5)
MCH RBC QN AUTO: 31.5 PG (ref 27–33)
MCHC RBC AUTO-ENTMCNC: 32.9 G/DL (ref 33.7–35.3)
MCV RBC AUTO: 95.7 FL (ref 81.4–97.8)
PHOSPHATE SERPL-MCNC: 1.6 MG/DL (ref 2.5–4.5)
PLATELET # BLD AUTO: 133 K/UL (ref 164–446)
PMV BLD AUTO: 10.7 FL (ref 9–12.9)
POTASSIUM SERPL-SCNC: 3.8 MMOL/L (ref 3.6–5.5)
POTASSIUM SERPL-SCNC: 4.4 MMOL/L (ref 3.6–5.5)
PROT SERPL-MCNC: 5.3 G/DL (ref 6–8.2)
RBC # BLD AUTO: 3.52 M/UL (ref 4.7–6.1)
SODIUM SERPL-SCNC: 138 MMOL/L (ref 135–145)
SODIUM SERPL-SCNC: 143 MMOL/L (ref 135–145)
TRIGL SERPL-MCNC: 95 MG/DL (ref 0–149)
WBC # BLD AUTO: 15.4 K/UL (ref 4.8–10.8)

## 2021-05-26 PROCEDURE — 94799 UNLISTED PULMONARY SVC/PX: CPT

## 2021-05-26 PROCEDURE — 94003 VENT MGMT INPAT SUBQ DAY: CPT

## 2021-05-26 PROCEDURE — 83735 ASSAY OF MAGNESIUM: CPT

## 2021-05-26 PROCEDURE — 700111 HCHG RX REV CODE 636 W/ 250 OVERRIDE (IP): Performed by: SURGERY

## 2021-05-26 PROCEDURE — 99291 CRITICAL CARE FIRST HOUR: CPT | Performed by: SURGERY

## 2021-05-26 PROCEDURE — 84478 ASSAY OF TRIGLYCERIDES: CPT

## 2021-05-26 PROCEDURE — 71045 X-RAY EXAM CHEST 1 VIEW: CPT

## 2021-05-26 PROCEDURE — 770022 HCHG ROOM/CARE - ICU (200)

## 2021-05-26 PROCEDURE — 85027 COMPLETE CBC AUTOMATED: CPT

## 2021-05-26 PROCEDURE — 93970 EXTREMITY STUDY: CPT

## 2021-05-26 PROCEDURE — 84100 ASSAY OF PHOSPHORUS: CPT

## 2021-05-26 PROCEDURE — 700101 HCHG RX REV CODE 250

## 2021-05-26 PROCEDURE — 80053 COMPREHEN METABOLIC PANEL: CPT

## 2021-05-26 PROCEDURE — 83605 ASSAY OF LACTIC ACID: CPT

## 2021-05-26 PROCEDURE — 94640 AIRWAY INHALATION TREATMENT: CPT

## 2021-05-26 PROCEDURE — 700105 HCHG RX REV CODE 258: Performed by: SURGERY

## 2021-05-26 PROCEDURE — 82962 GLUCOSE BLOOD TEST: CPT

## 2021-05-26 PROCEDURE — 80048 BASIC METABOLIC PNL TOTAL CA: CPT

## 2021-05-26 RX ORDER — ADENOSINE 3 MG/ML
INJECTION, SOLUTION INTRAVENOUS
Status: ACTIVE
Start: 2021-05-26 | End: 2021-05-27

## 2021-05-26 RX ORDER — LABETALOL HYDROCHLORIDE 5 MG/ML
INJECTION, SOLUTION INTRAVENOUS
Status: COMPLETED
Start: 2021-05-26 | End: 2021-05-26

## 2021-05-26 RX ORDER — DEXTROSE MONOHYDRATE 50 MG/ML
INJECTION, SOLUTION INTRAVENOUS CONTINUOUS
Status: DISCONTINUED | OUTPATIENT
Start: 2021-05-26 | End: 2021-05-29

## 2021-05-26 RX ADMIN — MORPHINE SULFATE 2 MG: 4 INJECTION INTRAVENOUS at 15:30

## 2021-05-26 RX ADMIN — DEXTROSE MONOHYDRATE: 50 INJECTION, SOLUTION INTRAVENOUS at 15:19

## 2021-05-26 RX ADMIN — AMPICILLIN SODIUM AND SULBACTAM SODIUM 3 G: 2; 1 INJECTION, POWDER, FOR SOLUTION INTRAMUSCULAR; INTRAVENOUS at 05:19

## 2021-05-26 RX ADMIN — MORPHINE SULFATE 2 MG: 4 INJECTION INTRAVENOUS at 03:53

## 2021-05-26 RX ADMIN — SODIUM CHLORIDE, POTASSIUM CHLORIDE, SODIUM LACTATE AND CALCIUM CHLORIDE: 600; 310; 30; 20 INJECTION, SOLUTION INTRAVENOUS at 17:53

## 2021-05-26 RX ADMIN — AMIODARONE HYDROCHLORIDE 1 MG/MIN: 1.8 INJECTION, SOLUTION INTRAVENOUS at 15:37

## 2021-05-26 RX ADMIN — AMIODARONE HYDROCHLORIDE 150 MG: 1.5 INJECTION, SOLUTION INTRAVENOUS at 18:52

## 2021-05-26 RX ADMIN — FAMOTIDINE 20 MG: 10 INJECTION INTRAVENOUS at 05:18

## 2021-05-26 RX ADMIN — AMIODARONE HYDROCHLORIDE 150 MG: 1.5 INJECTION, SOLUTION INTRAVENOUS at 15:16

## 2021-05-26 RX ADMIN — AMPICILLIN SODIUM AND SULBACTAM SODIUM 3 G: 2; 1 INJECTION, POWDER, FOR SOLUTION INTRAMUSCULAR; INTRAVENOUS at 23:50

## 2021-05-26 RX ADMIN — AMPICILLIN SODIUM AND SULBACTAM SODIUM 3 G: 2; 1 INJECTION, POWDER, FOR SOLUTION INTRAMUSCULAR; INTRAVENOUS at 17:53

## 2021-05-26 RX ADMIN — SODIUM CHLORIDE, POTASSIUM CHLORIDE, SODIUM LACTATE AND CALCIUM CHLORIDE: 600; 310; 30; 20 INJECTION, SOLUTION INTRAVENOUS at 08:26

## 2021-05-26 RX ADMIN — MORPHINE SULFATE 2 MG: 4 INJECTION INTRAVENOUS at 08:26

## 2021-05-26 RX ADMIN — AMPICILLIN SODIUM AND SULBACTAM SODIUM 3 G: 2; 1 INJECTION, POWDER, FOR SOLUTION INTRAMUSCULAR; INTRAVENOUS at 12:56

## 2021-05-26 RX ADMIN — LABETALOL HYDROCHLORIDE: 5 INJECTION, SOLUTION INTRAVENOUS at 12:50

## 2021-05-26 RX ADMIN — FAMOTIDINE 20 MG: 10 INJECTION INTRAVENOUS at 17:53

## 2021-05-26 RX ADMIN — MORPHINE SULFATE 4 MG: 4 INJECTION INTRAVENOUS at 18:59

## 2021-05-26 RX ADMIN — MORPHINE SULFATE 4 MG: 4 INJECTION INTRAVENOUS at 22:19

## 2021-05-26 RX ADMIN — AMIODARONE HYDROCHLORIDE 0.5 MG/MIN: 1.8 INJECTION, SOLUTION INTRAVENOUS at 21:38

## 2021-05-26 ASSESSMENT — PAIN DESCRIPTION - PAIN TYPE
TYPE: ACUTE PAIN
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
## 2021-05-26 NOTE — PROGRESS NOTES
@1300 Pt converted into SVT, -214. Dr. Johnson to bedside. Adenosine pulled. Pt converted back to SR before adenosine had to be given.      @1458 Pt converted into SVT -220. Dr. Johnson to bedisde.   Adenosine ready but converted back to sinus rhythm before it had to be administered. Pt initiated on amiodarone protocol, bolus given, drip running.

## 2021-05-26 NOTE — CARE PLAN
The patient is Watcher - Medium risk of patient condition declining or worsening    Problem: Pain - Standard  Goal: Alleviation of pain or a reduction in pain to the patient’s comfort goal  Note: Assessing pain level frequently using CPOT.  Providing Morphine 4 mg per MAR.  Providing non-pharmacological intervention, therapeutic communication.         Problem: Skin Integrity  Goal: Skin integrity is maintained or improved  Note: Latrell scale being used to assess skin break down risks.  Providing assistance with repositioning.  Collaborating and communicating with health care team to prevent pressure ulcer.  Q2 turns with TAPS & JULIANNA in place.

## 2021-05-26 NOTE — PROGRESS NOTES
2 RN skin check complete with Albertina  · Devices in place: pulse ox, BP cuff, cardiac monitor leads, trach, peripheral IVs, SCDs, munoz catheter  · Skin assessed under devices: Yes.  · Confirmed pressure ulcers found on: N/A  · New potential pressure ulcers noted on: N/A  · Skin breakdown noted on:     -Bruising to eyes  -Significant swelling to face  -Sutures in place to chin  -Small laceration to left check & MARY drain to R lateral neck   -Swelling & blanchable redness to trach site- RN cleansed & changed trach tie  -Missing teeth & scattered lacerations mouth and inside of upper and lower lips.- pictures taken and upload in epic, wound cx placed   -Calloused ft  -Abrasions to L shin       The following interventions in place: Q2H turns and repositioning, rotate pulse ox. Heels floated on pillows. Q2 turns in place. Assess under restraints Q2H. Ensure patient is not laying on tubing/lines. No redness or indications of pressure from devices or bony prominences. Mepilex in place. Low air loss mattress in use and mattress pressure appropriate for patient. TAPS in place.

## 2021-05-26 NOTE — CARE PLAN
Ventilator Daily Summary    Vent Day #3     Ventilator settings:  +8 30%    Plan: Continue current ventilator settings and wean mechanical ventilation as tolerated per physician orders.

## 2021-05-26 NOTE — CARE PLAN
The patient is Watcher - Medium risk of patient condition declining or worsening    Shift Goals  Clinical Goals: pulmonary hygeine, hemodyanmic stability   Patient Goals: pain management, mobility   Family Goals: emotional support     Progress made toward(s) clinical / shift goals: Pain management, hemodynamic stability (amio protocol initiated)    Patient is not progressing towards the following goals:       Problem: Pain - Standard  Goal: Alleviation of pain or a reduction in pain to the patient’s comfort goal  Outcome: Met     Problem: Skin Integrity  Goal: Skin integrity is maintained or improved  Outcome: Met     Problem: Fall Risk  Goal: Patient will remain free from falls  Outcome: Met     Problem: Safety - Medical Restraint  Goal: Free from restraint(s) (Restraint for Interference with Medical Device)  Outcome: Met     Problem: Provide Safe Environment  Goal: Suicide environmental safety, protocols, policies, and practices will be implemented  Outcome: Met
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
## 2021-05-26 NOTE — CARE PLAN
Vent Day # 3  Trache Day #3     Ventilator settings changed this shift:  Pt on T-piece 5L 30%, ok to T-piece during the day, rest at night.                Plan: Continue current ventilator settings and wean mechanical ventilation as tolerated per physician orders.
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
## 2021-05-26 NOTE — DISCHARGE PLANNING
Worked with RN and MD to complete legal hold paperwork.   Faxed paperwork to legal [facility] for processing.     CSSRS completed by RN - high risk.  Sitter at bedside     Psych consulted. Patient awake and Ox4. Able to communicate appropriately by writing things down.

## 2021-05-27 ENCOUNTER — APPOINTMENT (OUTPATIENT)
Dept: RADIOLOGY | Facility: MEDICAL CENTER | Age: 51
DRG: 003 | End: 2021-05-27
Attending: SURGERY
Payer: MEDICAID

## 2021-05-27 LAB
ANION GAP SERPL CALC-SCNC: 7 MMOL/L (ref 7–16)
BASOPHILS # BLD AUTO: 0.3 % (ref 0–1.8)
BASOPHILS # BLD: 0.04 K/UL (ref 0–0.12)
BUN SERPL-MCNC: 6 MG/DL (ref 8–22)
CALCIUM SERPL-MCNC: 8.2 MG/DL (ref 8.5–10.5)
CHLORIDE SERPL-SCNC: 102 MMOL/L (ref 96–112)
CO2 SERPL-SCNC: 29 MMOL/L (ref 20–33)
CREAT SERPL-MCNC: 0.61 MG/DL (ref 0.5–1.4)
EOSINOPHIL # BLD AUTO: 0 K/UL (ref 0–0.51)
EOSINOPHIL NFR BLD: 0 % (ref 0–6.9)
ERYTHROCYTE [DISTWIDTH] IN BLOOD BY AUTOMATED COUNT: 48.6 FL (ref 35.9–50)
GLUCOSE SERPL-MCNC: 136 MG/DL (ref 65–99)
HCT VFR BLD AUTO: 29.6 % (ref 42–52)
HGB BLD-MCNC: 10.1 G/DL (ref 14–18)
IMM GRANULOCYTES # BLD AUTO: 0.05 K/UL (ref 0–0.11)
IMM GRANULOCYTES NFR BLD AUTO: 0.4 % (ref 0–0.9)
LYMPHOCYTES # BLD AUTO: 1.73 K/UL (ref 1–4.8)
LYMPHOCYTES NFR BLD: 13.4 % (ref 22–41)
MAGNESIUM SERPL-MCNC: 2.2 MG/DL (ref 1.5–2.5)
MCH RBC QN AUTO: 31.8 PG (ref 27–33)
MCHC RBC AUTO-ENTMCNC: 34.1 G/DL (ref 33.7–35.3)
MCV RBC AUTO: 93.1 FL (ref 81.4–97.8)
MONOCYTES # BLD AUTO: 1.38 K/UL (ref 0–0.85)
MONOCYTES NFR BLD AUTO: 10.7 % (ref 0–13.4)
NEUTROPHILS # BLD AUTO: 9.75 K/UL (ref 1.82–7.42)
NEUTROPHILS NFR BLD: 75.2 % (ref 44–72)
NRBC # BLD AUTO: 0 K/UL
NRBC BLD-RTO: 0 /100 WBC
PHOSPHATE SERPL-MCNC: 2.3 MG/DL (ref 2.5–4.5)
PLATELET # BLD AUTO: 136 K/UL (ref 164–446)
PMV BLD AUTO: 11.2 FL (ref 9–12.9)
POTASSIUM SERPL-SCNC: 3.9 MMOL/L (ref 3.6–5.5)
RBC # BLD AUTO: 3.18 M/UL (ref 4.7–6.1)
SODIUM SERPL-SCNC: 138 MMOL/L (ref 135–145)
WBC # BLD AUTO: 13 K/UL (ref 4.8–10.8)

## 2021-05-27 PROCEDURE — 770022 HCHG ROOM/CARE - ICU (200)

## 2021-05-27 PROCEDURE — 99291 CRITICAL CARE FIRST HOUR: CPT | Performed by: SURGERY

## 2021-05-27 PROCEDURE — 84100 ASSAY OF PHOSPHORUS: CPT

## 2021-05-27 PROCEDURE — A9270 NON-COVERED ITEM OR SERVICE: HCPCS | Performed by: SURGERY

## 2021-05-27 PROCEDURE — 85025 COMPLETE CBC W/AUTO DIFF WBC: CPT

## 2021-05-27 PROCEDURE — 80048 BASIC METABOLIC PNL TOTAL CA: CPT

## 2021-05-27 PROCEDURE — 94640 AIRWAY INHALATION TREATMENT: CPT

## 2021-05-27 PROCEDURE — 83735 ASSAY OF MAGNESIUM: CPT

## 2021-05-27 PROCEDURE — 94003 VENT MGMT INPAT SUBQ DAY: CPT

## 2021-05-27 PROCEDURE — 71045 X-RAY EXAM CHEST 1 VIEW: CPT

## 2021-05-27 PROCEDURE — 700102 HCHG RX REV CODE 250 W/ 637 OVERRIDE(OP): Performed by: SURGERY

## 2021-05-27 PROCEDURE — 700105 HCHG RX REV CODE 258: Performed by: SURGERY

## 2021-05-27 PROCEDURE — 700111 HCHG RX REV CODE 636 W/ 250 OVERRIDE (IP): Performed by: SURGERY

## 2021-05-27 PROCEDURE — 700101 HCHG RX REV CODE 250: Performed by: SURGERY

## 2021-05-27 RX ADMIN — POTASSIUM PHOSPHATE, MONOBASIC AND POTASSIUM PHOSPHATE, DIBASIC 15 MMOL: 224; 236 INJECTION, SOLUTION, CONCENTRATE INTRAVENOUS at 10:30

## 2021-05-27 RX ADMIN — OXYCODONE HYDROCHLORIDE 10 MG: 10 TABLET ORAL at 02:20

## 2021-05-27 RX ADMIN — DEXTROSE MONOHYDRATE: 50 INJECTION, SOLUTION INTRAVENOUS at 23:29

## 2021-05-27 RX ADMIN — AMPICILLIN SODIUM AND SULBACTAM SODIUM 3 G: 2; 1 INJECTION, POWDER, FOR SOLUTION INTRAMUSCULAR; INTRAVENOUS at 17:28

## 2021-05-27 RX ADMIN — AMPICILLIN SODIUM AND SULBACTAM SODIUM 3 G: 2; 1 INJECTION, POWDER, FOR SOLUTION INTRAMUSCULAR; INTRAVENOUS at 11:53

## 2021-05-27 RX ADMIN — OXYCODONE HYDROCHLORIDE 10 MG: 10 TABLET ORAL at 23:27

## 2021-05-27 RX ADMIN — SODIUM CHLORIDE, POTASSIUM CHLORIDE, SODIUM LACTATE AND CALCIUM CHLORIDE: 600; 310; 30; 20 INJECTION, SOLUTION INTRAVENOUS at 02:30

## 2021-05-27 RX ADMIN — OXYCODONE HYDROCHLORIDE 10 MG: 10 TABLET ORAL at 18:45

## 2021-05-27 RX ADMIN — OXYCODONE HYDROCHLORIDE 10 MG: 10 TABLET ORAL at 14:57

## 2021-05-27 RX ADMIN — DOCUSATE SODIUM 50 MG AND SENNOSIDES 8.6 MG 1 TABLET: 8.6; 5 TABLET, FILM COATED ORAL at 20:26

## 2021-05-27 RX ADMIN — MAGNESIUM HYDROXIDE 30 ML: 400 SUSPENSION ORAL at 05:37

## 2021-05-27 RX ADMIN — AMPICILLIN SODIUM AND SULBACTAM SODIUM 3 G: 2; 1 INJECTION, POWDER, FOR SOLUTION INTRAMUSCULAR; INTRAVENOUS at 05:37

## 2021-05-27 RX ADMIN — AMIODARONE HYDROCHLORIDE 0.5 MG/MIN: 1.8 INJECTION, SOLUTION INTRAVENOUS at 06:34

## 2021-05-27 RX ADMIN — FAMOTIDINE 20 MG: 10 INJECTION INTRAVENOUS at 05:38

## 2021-05-27 RX ADMIN — AMIODARONE HYDROCHLORIDE 0.5 MG/MIN: 1.8 INJECTION, SOLUTION INTRAVENOUS at 16:45

## 2021-05-27 RX ADMIN — POLYETHYLENE GLYCOL 3350 1 PACKET: 17 POWDER, FOR SOLUTION ORAL at 05:37

## 2021-05-27 RX ADMIN — AMIODARONE HYDROCHLORIDE 0.5 MG/MIN: 1.8 INJECTION, SOLUTION INTRAVENOUS at 06:31

## 2021-05-27 RX ADMIN — DEXTROSE MONOHYDRATE: 50 INJECTION, SOLUTION INTRAVENOUS at 06:28

## 2021-05-27 RX ADMIN — FAMOTIDINE 20 MG: 20 TABLET ORAL at 17:27

## 2021-05-27 RX ADMIN — DOCUSATE SODIUM 100 MG: 50 LIQUID ORAL at 05:38

## 2021-05-27 RX ADMIN — AMPICILLIN SODIUM AND SULBACTAM SODIUM 3 G: 2; 1 INJECTION, POWDER, FOR SOLUTION INTRAMUSCULAR; INTRAVENOUS at 23:28

## 2021-05-27 RX ADMIN — DOCUSATE SODIUM 100 MG: 50 LIQUID ORAL at 17:27

## 2021-05-27 RX ADMIN — OXYCODONE HYDROCHLORIDE 10 MG: 10 TABLET ORAL at 08:19

## 2021-05-27 RX ADMIN — POLYETHYLENE GLYCOL 3350 1 PACKET: 17 POWDER, FOR SOLUTION ORAL at 17:27

## 2021-05-27 ASSESSMENT — PAIN DESCRIPTION - PAIN TYPE
TYPE: ACUTE PAIN

## 2021-05-27 ASSESSMENT — FIBROSIS 4 INDEX: FIB4 SCORE: 4.42

## 2021-05-27 NOTE — CARE PLAN
Problem: Psychosocial  Goal: Patient's ability to identify and develop effective coping behaviors will improve  Outcome: Not Met  Goal: Patient's ability to identify and utilize available support systems will improve  Outcome: Not Met   The patient is Watcher - Medium risk of patient condition declining or worsening    Shift Goals  Clinical Goals: pulmonary hygeine, hemodyanmic stability   Patient Goals: pain management, mobility   Family Goals: emotional support     Progress made toward(s) clinical / shift goals will participate in cares          Problem: Psychosocial  Goal: Patient's ability to identify and develop effective coping behaviors will improve  Outcome: Not Met  Goal: Patient's ability to identify and utilize available support systems will improve  Outcome: Not Met

## 2021-05-27 NOTE — ASSESSMENT & PLAN NOTE
5/26 two episodes with rate > 200.   - Resolved prior to intervention.   - Amiodarone protocol initiated.  5/28 Transition to po amiodarone.  6/12 DC amiodarone and cardiac monitoring   RESOLVED

## 2021-05-27 NOTE — PROGRESS NOTES
Cortrak Placement    Tube Team verified patient name and medical record number prior to tube placement.  Cortrak tube (55 inches, 10 Cambodian) placed at 85 cm in right nare.    Nursing Instructions: Awaiting KUB to confirm placement before use for medications or feeding. Once placement confirmed, flush tube with 30 ml of water, and then remove and save stylet, in patient medication drawer.

## 2021-05-27 NOTE — DISCHARGE PLANNING
Current documentation reveals a potential for acute inpatient rehabilitation, please consider a PMR referral to assist with discharge planning. TBI
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
## 2021-05-27 NOTE — PROGRESS NOTES
"/64   Pulse 69   Temp 37.2 °C (99 °F) (Bladder)   Resp (!) 10   Ht 1.803 m (5' 11\")   Wt 96.1 kg (211 lb 13.8 oz)   SpO2 99%     I/O last 3 completed shifts:  In: 4537.9 [I.V.:3907.9; NG/GT:150]  Out: 1850 [Urine:1550; Drains:300]    SP ORIF mandible    PT unchanged NO further surgery planned as of now  "

## 2021-05-27 NOTE — PROGRESS NOTES
Pt in SVT for 4th time. Zole connected. MD notified. New orders received and implemented. Lesli with gagan.
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
## 2021-05-27 NOTE — CARE PLAN
The patient is Watcher - Medium risk of patient condition declining or worsening    Shift Goals  Clinical Goals: hemodynamic stability, mobility    Patient Goals: pain control   Family Goals: emotional support     Progress made toward(s) clinical / shift goals:  Pain control, emotional support, hemodynamic stability     Patient is not progressing towards the following goals:coping skills       Problem: Psychosocial  Goal: Patient's ability to identify and develop effective coping behaviors will improve  Outcome: Not Met  Goal: Patient's ability to identify and utilize available support systems will improve  Outcome: Not Met      Problem: Fall Risk  Goal: Patient will remain free from falls  Outcome: Met     Problem: Pain - Standard  Goal: Alleviation of pain or a reduction in pain to the patient’s comfort goal  Outcome: Met

## 2021-05-27 NOTE — CARE PLAN
Problem: Ventilation  Goal: Ability to achieve and maintain unassisted ventilation or tolerate decreased levels of ventilator support  Description: Document on Vent flowsheet    1.  Support and monitor invasive and noninvasive mechanical ventilation  2.  Monitor ventilator weaning response  3.  Perform ventilator associated pneumonia prevention interventions  4.  Manage ventilation therapy by monitoring diagnostic test results  Outcome: Progressing      Ventilator Daily Summary    Vent Day # 4  Trache Day # 4  7 por    Weaning trials: T-Piece 8 hrs    Plan: Continue current ventilator settings and wean mechanical ventilation as tolerated per physician orders.

## 2021-05-27 NOTE — PROGRESS NOTES
Pt again in SVT, MD notified. Pt converted back to SR. BP stable for duration of SVT run. Zole pads left on Pt in anticipation of recurrence.

## 2021-05-27 NOTE — CONSULTS
"Brief Behavioral Health Note:    Patient was seen sitting up on hospital bed, unable to talk at this time but is alert and able to respond to questions with gestures and writing short sentences.  Patient confirmed suicide attempt writing: \"I was tired of it all\". Patient added, \"I changed my mind\". Patient was unable to elaborate at this time. A full evaluation should take place when patient is able to fully engage in the interview process.    In the meantime due to the severity of the suicide attempt, patient will remain on a legal hold and it will be extended.    Recommendations:    1. One to one sitter when moved out of ICU to the medical floor (currently in ICU he has close supervision)  2. No phone  3. No personal belongings  4. Visitors limited to :Mother Delfina Tovar; Sister Ingrid and her ; [place], nephew, Tanika Mueller- sister; Ex partner Vadim King  5. Please transfer to an inpatient psychiatric hospital when patient is medically cleared.  6. Legal hold-extended    Thank you,    Jojo Cooley, Ph.D., [facility] [place]    "

## 2021-05-28 ENCOUNTER — APPOINTMENT (OUTPATIENT)
Dept: RADIOLOGY | Facility: MEDICAL CENTER | Age: 51
DRG: 003 | End: 2021-05-28
Attending: SURGERY
Payer: MEDICAID

## 2021-05-28 LAB
ALBUMIN SERPL BCP-MCNC: 3 G/DL (ref 3.2–4.9)
ALBUMIN/GLOB SERPL: 1.1 G/DL
ALP SERPL-CCNC: 55 U/L (ref 30–99)
ALT SERPL-CCNC: 24 U/L (ref 2–50)
ANION GAP SERPL CALC-SCNC: 8 MMOL/L (ref 7–16)
AST SERPL-CCNC: 37 U/L (ref 12–45)
BASOPHILS # BLD AUTO: 0.2 % (ref 0–1.8)
BASOPHILS # BLD: 0.02 K/UL (ref 0–0.12)
BILIRUB SERPL-MCNC: 0.7 MG/DL (ref 0.1–1.5)
BUN SERPL-MCNC: 9 MG/DL (ref 8–22)
CALCIUM SERPL-MCNC: 8.4 MG/DL (ref 8.5–10.5)
CHLORIDE SERPL-SCNC: 101 MMOL/L (ref 96–112)
CO2 SERPL-SCNC: 31 MMOL/L (ref 20–33)
CREAT SERPL-MCNC: 0.59 MG/DL (ref 0.5–1.4)
EOSINOPHIL # BLD AUTO: 0.16 K/UL (ref 0–0.51)
EOSINOPHIL NFR BLD: 1.5 % (ref 0–6.9)
ERYTHROCYTE [DISTWIDTH] IN BLOOD BY AUTOMATED COUNT: 46.8 FL (ref 35.9–50)
GLOBULIN SER CALC-MCNC: 2.7 G/DL (ref 1.9–3.5)
GLUCOSE SERPL-MCNC: 128 MG/DL (ref 65–99)
HCT VFR BLD AUTO: 31.1 % (ref 42–52)
HGB BLD-MCNC: 10.3 G/DL (ref 14–18)
IMM GRANULOCYTES # BLD AUTO: 0.05 K/UL (ref 0–0.11)
IMM GRANULOCYTES NFR BLD AUTO: 0.5 % (ref 0–0.9)
LYMPHOCYTES # BLD AUTO: 1.61 K/UL (ref 1–4.8)
LYMPHOCYTES NFR BLD: 15.3 % (ref 22–41)
MCH RBC QN AUTO: 31.2 PG (ref 27–33)
MCHC RBC AUTO-ENTMCNC: 33.1 G/DL (ref 33.7–35.3)
MCV RBC AUTO: 94.2 FL (ref 81.4–97.8)
MONOCYTES # BLD AUTO: 1.29 K/UL (ref 0–0.85)
MONOCYTES NFR BLD AUTO: 12.2 % (ref 0–13.4)
NEUTROPHILS # BLD AUTO: 7.41 K/UL (ref 1.82–7.42)
NEUTROPHILS NFR BLD: 70.3 % (ref 44–72)
NRBC # BLD AUTO: 0 K/UL
NRBC BLD-RTO: 0 /100 WBC
PLATELET # BLD AUTO: 157 K/UL (ref 164–446)
PMV BLD AUTO: 10.4 FL (ref 9–12.9)
POTASSIUM SERPL-SCNC: 3.9 MMOL/L (ref 3.6–5.5)
PROT SERPL-MCNC: 5.7 G/DL (ref 6–8.2)
RBC # BLD AUTO: 3.3 M/UL (ref 4.7–6.1)
SODIUM SERPL-SCNC: 140 MMOL/L (ref 135–145)
WBC # BLD AUTO: 10.5 K/UL (ref 4.8–10.8)

## 2021-05-28 PROCEDURE — 99291 CRITICAL CARE FIRST HOUR: CPT | Performed by: SURGERY

## 2021-05-28 PROCEDURE — A9270 NON-COVERED ITEM OR SERVICE: HCPCS | Performed by: SURGERY

## 2021-05-28 PROCEDURE — 700102 HCHG RX REV CODE 250 W/ 637 OVERRIDE(OP): Performed by: SURGERY

## 2021-05-28 PROCEDURE — 74018 RADEX ABDOMEN 1 VIEW: CPT

## 2021-05-28 PROCEDURE — L8501 TRACHEOSTOMY SPEAKING VALVE: HCPCS

## 2021-05-28 PROCEDURE — 700105 HCHG RX REV CODE 258: Performed by: SURGERY

## 2021-05-28 PROCEDURE — 770022 HCHG ROOM/CARE - ICU (200)

## 2021-05-28 PROCEDURE — 305246 HCHG SPEAKING VALVE ADAPTER

## 2021-05-28 PROCEDURE — 97763 ORTHC/PROSTC MGMT SBSQ ENC: CPT

## 2021-05-28 PROCEDURE — 700111 HCHG RX REV CODE 636 W/ 250 OVERRIDE (IP): Performed by: SURGERY

## 2021-05-28 PROCEDURE — 80053 COMPREHEN METABOLIC PANEL: CPT

## 2021-05-28 PROCEDURE — 71045 X-RAY EXAM CHEST 1 VIEW: CPT

## 2021-05-28 PROCEDURE — 94640 AIRWAY INHALATION TREATMENT: CPT

## 2021-05-28 PROCEDURE — 92522 EVALUATE SPEECH PRODUCTION: CPT

## 2021-05-28 PROCEDURE — 85025 COMPLETE CBC W/AUTO DIFF WBC: CPT

## 2021-05-28 RX ORDER — AMIODARONE HYDROCHLORIDE 200 MG/1
400 TABLET ORAL TWICE DAILY
Status: DISCONTINUED | OUTPATIENT
Start: 2021-05-28 | End: 2021-06-04

## 2021-05-28 RX ADMIN — AMPICILLIN SODIUM AND SULBACTAM SODIUM 3 G: 2; 1 INJECTION, POWDER, FOR SOLUTION INTRAMUSCULAR; INTRAVENOUS at 16:36

## 2021-05-28 RX ADMIN — AMPICILLIN SODIUM AND SULBACTAM SODIUM 3 G: 2; 1 INJECTION, POWDER, FOR SOLUTION INTRAMUSCULAR; INTRAVENOUS at 05:01

## 2021-05-28 RX ADMIN — AMPICILLIN SODIUM AND SULBACTAM SODIUM 3 G: 2; 1 INJECTION, POWDER, FOR SOLUTION INTRAMUSCULAR; INTRAVENOUS at 11:08

## 2021-05-28 RX ADMIN — AMIODARONE HYDROCHLORIDE 400 MG: 200 TABLET ORAL at 23:15

## 2021-05-28 RX ADMIN — FAMOTIDINE 20 MG: 20 TABLET ORAL at 05:01

## 2021-05-28 RX ADMIN — DOCUSATE SODIUM 100 MG: 50 LIQUID ORAL at 05:01

## 2021-05-28 RX ADMIN — OXYCODONE HYDROCHLORIDE 10 MG: 10 TABLET ORAL at 23:15

## 2021-05-28 RX ADMIN — OXYCODONE 5 MG: 5 TABLET ORAL at 05:01

## 2021-05-28 RX ADMIN — ENALAPRILAT 1.25 MG: 1.25 INJECTION INTRAVENOUS at 05:02

## 2021-05-28 RX ADMIN — MAGNESIUM HYDROXIDE 30 ML: 400 SUSPENSION ORAL at 05:01

## 2021-05-28 RX ADMIN — AMIODARONE HYDROCHLORIDE 0.5 MG/MIN: 1.8 INJECTION, SOLUTION INTRAVENOUS at 03:25

## 2021-05-28 RX ADMIN — POLYETHYLENE GLYCOL 3350 1 PACKET: 17 POWDER, FOR SOLUTION ORAL at 05:02

## 2021-05-28 RX ADMIN — AMIODARONE HYDROCHLORIDE 400 MG: 200 TABLET ORAL at 11:03

## 2021-05-28 ASSESSMENT — PAIN DESCRIPTION - PAIN TYPE
TYPE: ACUTE PAIN

## 2021-05-28 NOTE — CARE PLAN
Problem: Pain - Standard  Goal: Alleviation of pain or a reduction in pain to the patient’s comfort goal  Outcome: Progressing   RN to assess pt's pain on arrival and at scheduled intervals. RN to educate pt on pain scale and a multimodal approach to pain management with medications, repositioning, distraction, and cool/heat packs. RN to evaluate effectiveness of measured used with pt and great comfort goal and plan of care for pain management.     Problem: Psychosocial  Goal: Patient's ability to identify and develop effective coping behaviors will improve  Outcome: Progressing  Goal: Patient's ability to identify and utilize available support systems will improve  Outcome: Progressing     The patient is Stable - Low risk of patient condition declining or worsening    Shift Goals  Clinical Goals: pt will not have suicidal ideation during shift  Patient Goals: pain control   Family Goals: emotional support     Progress made toward(s) clinical / shift goals:  All    Patient is not progressing towards the following goals:

## 2021-05-28 NOTE — CARE PLAN
Problem: Psychosocial  Goal: Patient's ability to identify and develop effective coping behaviors will improve  Outcome: Progressing  Goal: Patient's ability to identify and utilize available support systems will improve  Outcome: Progressing   The patient is Stable - Low risk of patient condition declining or worsening    Shift Goals  Clinical Goals: pt will not have suicidal ideation during shift  Patient Goals: pain control   Family Goals: emotional support     Progress made toward(s) clinical / shift goals:  patient will not express SI during shift

## 2021-05-28 NOTE — THERAPY
Speech Language Pathology   Speaking Valve     Patient Name: Greg Tovar  AGE:  51 y.o., SEX:  male  Medical Record #: [id number]  Today's Date: 5/28/2021     Assessment    The patient was seen for speaking valve evaluation this date. The patient was awake, alert and sitting upright in bedside chair. Patient’s mother was present for evaluation. Per RT and RN, patient with moderated oral and tracheal secretions but cleared by trauma critical care to complete speaking valve evaluation. The patient had #7.0 Portex trach emergently placed on 5/24/21. The patient’s vitals were SpO2 >94% on 4L FiO2 28% via T-piece, HR 83-94 and RR 18-23. The patient tolerated tracheal suctioning with moderate thin clear secretions suctioned from trachea. Oral secretions were clear/blood tinged. Patient tolerated slow cuff deflation and finger occlusion with patent upper airway and short phonation. Speaking valve placed with immediate voicing of words/short phrases. Patient appeared to have adequate breath support for words however, appeared limited by frustration as a result of poor intelligibility 2/2 oral structure changes given nature of injury. After 15 minutes speaking valve was removed due to patient having episode of increased coughing and requested a break. At this time, recommend trained staff place speaking valve for short periods (<25minutes) to promote communication with direct 1:1 supervision to ensure safety and assistance with secretion management. SLP following and will await orders for swallow evaluation as medically appropriate per care team.      Plan    Recommendations: 1) Okay for trained staff to place speaking valve for short periods (<25minutes) to promote communication with direct 1:1 supervision to ensure safety and assistance with secretion management.  2) SLP following and will await orders for swallow evaluation as medically appropriate per care team.    Recommend Speech Therapy 3 times per week until therapy goals  "are met for the following treatments:  Expression Training.    Discharge Recommendations: Recommend post-acute placement for additional speech therapy services prior to discharge home     Objective       05/28/21 1529   Prior Level Of Function   Communication Within Functional Limits   Swallow Within Functional Limits   Dentition Edentulous   Dentures None   Hearing Within Functional Limits for Evaluation   Vision Within Functional Limits for Evaluation  (now impaired)   Patient's Primary Language English   Occupation (Pre-Hospital Vocational) Not Employed   Speaking Valve Airway Assessment   Type of Airway Standard Cuffed Trach;Portex Trach   Size of Airway 7.0   FiO2% 28 %   Respiratory Support T - Piece   Trial Speaking Valve Placement   Speaking Valve Style Aqua   Cuff Inflation Deflated   Cuff Pressure 4   Upper Airway Patency Adequate   Tolerated Intervention (Yes / No) Yes   Duration Of Speaking Valve Tolerance (Minutes) 15   Downsize Trach Recommended (Yes / No) No   Speaking Valve Placement Problems   Respiratory / Cardiac Problems Observed Other (Comments)  (Increased coughing/secretions)   Physical / Psychosocial Problems Observed None   Oral-Pharyngeal Status   Current Method Of Nutrition Nasogastric Tube (NG Tube)   Secretion Management Moderate;Thin;Clear / White;Blood Tinged   Voice Production Reduced Loudness   Breath Phase Coordination Adequate for Words Level  (Hindered by intellegibility 2/2 oral structure changes)   Ybarra Blue Dye Test With Ice Chips Not Tested   Patient / Family Goals   Patient / Family Goal #1 \" First step.\"   Short Term Goals   Short Term Goal # 1 The patient will utilize respiratory training strategies to facilitate communication with use of speaking valve at the short phrase level.          "
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
## 2021-05-28 NOTE — CARE PLAN
Respiratory Update    Treatment modality: Aerosol t-piece  Frequency: Q4    4L @ 28%, 7.0 portex    Pt tolerating current treatments well with no adverse reactions.    Problem: Bronchopulmonary Hygiene:  Goal: Increase mobilization of retained secretions  Outcome: Progressing     Problem: Ventilation  Goal: Ability to achieve and maintain unassisted ventilation or tolerate decreased levels of ventilator support  Description: Document on Vent flowsheet    1.  Support and monitor invasive and noninvasive mechanical ventilation  2.  Monitor ventilator weaning response  3.  Perform ventilator associated pneumonia prevention interventions  4.  Manage ventilation therapy by monitoring diagnostic test results  Outcome: Met

## 2021-05-28 NOTE — DISCHARGE PLANNING
Received verified petition from the court. Scanned copy of legal hold extension into pt's chart, sent copy to LIYAH Andrews.

## 2021-05-28 NOTE — CARE PLAN
Problem: Nutritional:  Goal: Nutrition support tolerated and meeting greater than 85% of estimated needs  Outcome: Met   Impact @ 60 mL/h (goal rate). RD continues to follow.

## 2021-05-29 ENCOUNTER — APPOINTMENT (OUTPATIENT)
Dept: RADIOLOGY | Facility: MEDICAL CENTER | Age: 51
DRG: 003 | End: 2021-05-29
Attending: SURGERY
Payer: MEDICAID

## 2021-05-29 LAB
BASOPHILS # BLD AUTO: 0.3 % (ref 0–1.8)
BASOPHILS # BLD: 0.03 K/UL (ref 0–0.12)
EOSINOPHIL # BLD AUTO: 0 K/UL (ref 0–0.51)
EOSINOPHIL NFR BLD: 0 % (ref 0–6.9)
ERYTHROCYTE [DISTWIDTH] IN BLOOD BY AUTOMATED COUNT: 45.5 FL (ref 35.9–50)
HCT VFR BLD AUTO: 33.2 % (ref 42–52)
HGB BLD-MCNC: 10.9 G/DL (ref 14–18)
IMM GRANULOCYTES # BLD AUTO: 0.05 K/UL (ref 0–0.11)
IMM GRANULOCYTES NFR BLD AUTO: 0.5 % (ref 0–0.9)
LYMPHOCYTES # BLD AUTO: 1.22 K/UL (ref 1–4.8)
LYMPHOCYTES NFR BLD: 12 % (ref 22–41)
MCH RBC QN AUTO: 30.9 PG (ref 27–33)
MCHC RBC AUTO-ENTMCNC: 32.8 G/DL (ref 33.7–35.3)
MCV RBC AUTO: 94.1 FL (ref 81.4–97.8)
MONOCYTES # BLD AUTO: 1.53 K/UL (ref 0–0.85)
MONOCYTES NFR BLD AUTO: 15 % (ref 0–13.4)
NEUTROPHILS # BLD AUTO: 7.34 K/UL (ref 1.82–7.42)
NEUTROPHILS NFR BLD: 72.2 % (ref 44–72)
NRBC # BLD AUTO: 0 K/UL
NRBC BLD-RTO: 0 /100 WBC
PLATELET # BLD AUTO: 180 K/UL (ref 164–446)
PMV BLD AUTO: 10.4 FL (ref 9–12.9)
RBC # BLD AUTO: 3.53 M/UL (ref 4.7–6.1)
WBC # BLD AUTO: 10.2 K/UL (ref 4.8–10.8)

## 2021-05-29 PROCEDURE — 99291 CRITICAL CARE FIRST HOUR: CPT | Performed by: SURGERY

## 2021-05-29 PROCEDURE — A9270 NON-COVERED ITEM OR SERVICE: HCPCS | Performed by: SURGERY

## 2021-05-29 PROCEDURE — 700111 HCHG RX REV CODE 636 W/ 250 OVERRIDE (IP): Performed by: SURGERY

## 2021-05-29 PROCEDURE — 71045 X-RAY EXAM CHEST 1 VIEW: CPT

## 2021-05-29 PROCEDURE — 770022 HCHG ROOM/CARE - ICU (200)

## 2021-05-29 PROCEDURE — 700102 HCHG RX REV CODE 250 W/ 637 OVERRIDE(OP): Performed by: SURGERY

## 2021-05-29 PROCEDURE — 94640 AIRWAY INHALATION TREATMENT: CPT

## 2021-05-29 PROCEDURE — 700105 HCHG RX REV CODE 258: Performed by: SURGERY

## 2021-05-29 PROCEDURE — 85025 COMPLETE CBC W/AUTO DIFF WBC: CPT

## 2021-05-29 RX ORDER — ACETAMINOPHEN 325 MG/1
650 TABLET ORAL EVERY 4 HOURS PRN
Status: DISCONTINUED | OUTPATIENT
Start: 2021-05-29 | End: 2021-06-27

## 2021-05-29 RX ADMIN — AMPICILLIN SODIUM AND SULBACTAM SODIUM 3 G: 2; 1 INJECTION, POWDER, FOR SOLUTION INTRAMUSCULAR; INTRAVENOUS at 11:23

## 2021-05-29 RX ADMIN — ENOXAPARIN SODIUM 40 MG: 40 INJECTION SUBCUTANEOUS at 11:23

## 2021-05-29 RX ADMIN — FAMOTIDINE 20 MG: 20 TABLET ORAL at 05:44

## 2021-05-29 RX ADMIN — FAMOTIDINE 20 MG: 20 TABLET ORAL at 16:50

## 2021-05-29 RX ADMIN — ACETAMINOPHEN 650 MG: 325 TABLET, FILM COATED ORAL at 15:37

## 2021-05-29 RX ADMIN — AMPICILLIN SODIUM AND SULBACTAM SODIUM 3 G: 2; 1 INJECTION, POWDER, FOR SOLUTION INTRAMUSCULAR; INTRAVENOUS at 16:50

## 2021-05-29 RX ADMIN — AMPICILLIN SODIUM AND SULBACTAM SODIUM 3 G: 2; 1 INJECTION, POWDER, FOR SOLUTION INTRAMUSCULAR; INTRAVENOUS at 05:45

## 2021-05-29 RX ADMIN — AMIODARONE HYDROCHLORIDE 400 MG: 200 TABLET ORAL at 21:30

## 2021-05-29 RX ADMIN — DOCUSATE SODIUM 50 MG AND SENNOSIDES 8.6 MG 1 TABLET: 8.6; 5 TABLET, FILM COATED ORAL at 21:31

## 2021-05-29 RX ADMIN — OXYCODONE HYDROCHLORIDE 10 MG: 10 TABLET ORAL at 05:45

## 2021-05-29 RX ADMIN — OXYCODONE HYDROCHLORIDE 10 MG: 10 TABLET ORAL at 09:14

## 2021-05-29 RX ADMIN — AMPICILLIN SODIUM AND SULBACTAM SODIUM 3 G: 2; 1 INJECTION, POWDER, FOR SOLUTION INTRAMUSCULAR; INTRAVENOUS at 00:28

## 2021-05-29 RX ADMIN — AMIODARONE HYDROCHLORIDE 400 MG: 200 TABLET ORAL at 09:14

## 2021-05-29 RX ADMIN — OXYCODONE HYDROCHLORIDE 10 MG: 10 TABLET ORAL at 21:29

## 2021-05-29 RX ADMIN — OXYCODONE 5 MG: 5 TABLET ORAL at 15:38

## 2021-05-29 ASSESSMENT — PAIN DESCRIPTION - PAIN TYPE
TYPE: ACUTE PAIN

## 2021-05-29 NOTE — CARE PLAN
Problem: Pain - Standard  Goal: Alleviation of pain or a reduction in pain to the patient’s comfort goal  Outcome: Progressing  Pt's pain will decrease to comfort goal through rest, repositioning, a quiet environment, and PRN pharmacologic analgesia.       Problem: Psychosocial  Goal: Patient's ability to identify and develop effective coping behaviors will improve  Outcome: Progressing  COMMUNICATING APPROPRIATELY,    The patient is Stable - Low risk of patient condition declining or worsening    Shift Goals  Clinical Goals: pt will not have suicidal ideation during shift  Patient Goals: pain control   Family Goals: emotional support     Progress made toward(s) clinical / shift goals:  PROGRESSING

## 2021-05-29 NOTE — CARE PLAN
The patient is Stable - Low risk of patient condition declining or worsening    Shift Goals  Clinical Goals: pt will not have suicidal ideation during shift  Patient Goals: pain control   Family Goals: emotional support     Progress made toward(s) clinical / shift goals:  Up to chair, pt communicates with notes, telesitter at bedside
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
## 2021-05-29 NOTE — CARE PLAN
Problem: Psychosocial  Goal: Patient's ability to identify and develop effective coping behaviors will improve  5/29/2021 0856 by Jennifer Okeefe R.N.  Outcome: Progressing  5/29/2021 0855 by Jennifer Okeefe R.N.  Outcome: Progressing  Goal: Patient's ability to identify and utilize available support systems will improve  5/29/2021 0856 by Jennifer Okeefe R.N.  Outcome: Progressing  5/29/2021 0855 by Jennifer Okeefe R.N.  Outcome: Progressing     Problem: Pain - Standard  Goal: Alleviation of pain or a reduction in pain to the patient’s comfort goal  5/29/2021 0856 by Jennifer Okeefe R.N.  Outcome: Progressing  5/29/2021 0855 by Jennifer Okeefe R.N.  Outcome: Progressing

## 2021-05-29 NOTE — RESPIRATORY CARE
Respiratory Update    Treatment modality: Heated T-piece    Frequency: Q4    Pt capped at 1444, 4L NC.  Clearing secretions, tolerating well.    Will continue to monitor and wean as tolerated of per physician orders.

## 2021-05-29 NOTE — PROGRESS NOTES
Cortrak Placement    Tube Team verified patient name and medical record number prior to tube placement.  Cortrak tube (43 inches, 12 Sudanese) placed at 80 cm in right nare.  Per Cortrak picture, tube appears to be in the stomach.    Nursing Instructions: Awaiting KUB to confirm placement before use for medications or feeding. Once placement confirmed, flush tube with 30 ml of water, and then remove and save stylet, in patient medication drawer.

## 2021-05-30 ENCOUNTER — APPOINTMENT (OUTPATIENT)
Dept: RADIOLOGY | Facility: MEDICAL CENTER | Age: 51
DRG: 003 | End: 2021-05-30
Attending: SURGERY
Payer: MEDICAID

## 2021-05-30 LAB
BASOPHILS # BLD AUTO: 0.3 % (ref 0–1.8)
BASOPHILS # BLD: 0.03 K/UL (ref 0–0.12)
EOSINOPHIL # BLD AUTO: 0.44 K/UL (ref 0–0.51)
EOSINOPHIL NFR BLD: 4.8 % (ref 0–6.9)
ERYTHROCYTE [DISTWIDTH] IN BLOOD BY AUTOMATED COUNT: 46.4 FL (ref 35.9–50)
HCT VFR BLD AUTO: 30.9 % (ref 42–52)
HGB BLD-MCNC: 9.9 G/DL (ref 14–18)
IMM GRANULOCYTES # BLD AUTO: 0.09 K/UL (ref 0–0.11)
IMM GRANULOCYTES NFR BLD AUTO: 1 % (ref 0–0.9)
LYMPHOCYTES # BLD AUTO: 1.55 K/UL (ref 1–4.8)
LYMPHOCYTES NFR BLD: 16.9 % (ref 22–41)
MCH RBC QN AUTO: 30.8 PG (ref 27–33)
MCHC RBC AUTO-ENTMCNC: 32 G/DL (ref 33.7–35.3)
MCV RBC AUTO: 96.3 FL (ref 81.4–97.8)
MONOCYTES # BLD AUTO: 1.55 K/UL (ref 0–0.85)
MONOCYTES NFR BLD AUTO: 16.9 % (ref 0–13.4)
NEUTROPHILS # BLD AUTO: 5.52 K/UL (ref 1.82–7.42)
NEUTROPHILS NFR BLD: 60.1 % (ref 44–72)
NRBC # BLD AUTO: 0 K/UL
NRBC BLD-RTO: 0 /100 WBC
PLATELET # BLD AUTO: 201 K/UL (ref 164–446)
PMV BLD AUTO: 10.3 FL (ref 9–12.9)
RBC # BLD AUTO: 3.21 M/UL (ref 4.7–6.1)
WBC # BLD AUTO: 9.2 K/UL (ref 4.8–10.8)

## 2021-05-30 PROCEDURE — 85025 COMPLETE CBC W/AUTO DIFF WBC: CPT

## 2021-05-30 PROCEDURE — 700111 HCHG RX REV CODE 636 W/ 250 OVERRIDE (IP): Performed by: SURGERY

## 2021-05-30 PROCEDURE — A9270 NON-COVERED ITEM OR SERVICE: HCPCS | Performed by: SURGERY

## 2021-05-30 PROCEDURE — 90791 PSYCH DIAGNOSTIC EVALUATION: CPT | Performed by: PSYCHOLOGIST

## 2021-05-30 PROCEDURE — 71045 X-RAY EXAM CHEST 1 VIEW: CPT

## 2021-05-30 PROCEDURE — 770022 HCHG ROOM/CARE - ICU (200)

## 2021-05-30 PROCEDURE — 99233 SBSQ HOSP IP/OBS HIGH 50: CPT | Performed by: SURGERY

## 2021-05-30 PROCEDURE — 700102 HCHG RX REV CODE 250 W/ 637 OVERRIDE(OP): Performed by: SURGERY

## 2021-05-30 PROCEDURE — 700105 HCHG RX REV CODE 258: Performed by: SURGERY

## 2021-05-30 PROCEDURE — 94640 AIRWAY INHALATION TREATMENT: CPT

## 2021-05-30 RX ADMIN — ENOXAPARIN SODIUM 40 MG: 40 INJECTION SUBCUTANEOUS at 05:24

## 2021-05-30 RX ADMIN — FAMOTIDINE 20 MG: 20 TABLET ORAL at 05:25

## 2021-05-30 RX ADMIN — ACETAMINOPHEN 650 MG: 325 TABLET, FILM COATED ORAL at 05:25

## 2021-05-30 RX ADMIN — FAMOTIDINE 20 MG: 20 TABLET ORAL at 17:15

## 2021-05-30 RX ADMIN — MORPHINE SULFATE 4 MG: 4 INJECTION INTRAVENOUS at 22:12

## 2021-05-30 RX ADMIN — AMPICILLIN SODIUM AND SULBACTAM SODIUM 3 G: 2; 1 INJECTION, POWDER, FOR SOLUTION INTRAMUSCULAR; INTRAVENOUS at 17:15

## 2021-05-30 RX ADMIN — AMIODARONE HYDROCHLORIDE 400 MG: 200 TABLET ORAL at 20:50

## 2021-05-30 RX ADMIN — OXYCODONE HYDROCHLORIDE 10 MG: 10 TABLET ORAL at 20:50

## 2021-05-30 RX ADMIN — OXYCODONE 5 MG: 5 TABLET ORAL at 08:28

## 2021-05-30 RX ADMIN — AMPICILLIN SODIUM AND SULBACTAM SODIUM 3 G: 2; 1 INJECTION, POWDER, FOR SOLUTION INTRAMUSCULAR; INTRAVENOUS at 06:00

## 2021-05-30 RX ADMIN — ACETAMINOPHEN 650 MG: 325 TABLET, FILM COATED ORAL at 01:13

## 2021-05-30 RX ADMIN — OXYCODONE HYDROCHLORIDE 10 MG: 10 TABLET ORAL at 05:25

## 2021-05-30 RX ADMIN — AMIODARONE HYDROCHLORIDE 400 MG: 200 TABLET ORAL at 08:28

## 2021-05-30 RX ADMIN — OXYCODONE 5 MG: 5 TABLET ORAL at 11:39

## 2021-05-30 RX ADMIN — OXYCODONE 5 MG: 5 TABLET ORAL at 15:47

## 2021-05-30 RX ADMIN — POLYETHYLENE GLYCOL 3350 1 PACKET: 17 POWDER, FOR SOLUTION ORAL at 17:15

## 2021-05-30 RX ADMIN — AMPICILLIN SODIUM AND SULBACTAM SODIUM 3 G: 2; 1 INJECTION, POWDER, FOR SOLUTION INTRAMUSCULAR; INTRAVENOUS at 01:13

## 2021-05-30 RX ADMIN — DOCUSATE SODIUM 100 MG: 50 LIQUID ORAL at 17:15

## 2021-05-30 RX ADMIN — OXYCODONE HYDROCHLORIDE 10 MG: 10 TABLET ORAL at 01:13

## 2021-05-30 RX ADMIN — AMPICILLIN SODIUM AND SULBACTAM SODIUM 3 G: 2; 1 INJECTION, POWDER, FOR SOLUTION INTRAMUSCULAR; INTRAVENOUS at 11:41

## 2021-05-30 ASSESSMENT — PAIN DESCRIPTION - PAIN TYPE
TYPE: ACUTE PAIN

## 2021-05-30 ASSESSMENT — FIBROSIS 4 INDEX: FIB4 SCORE: 1.99

## 2021-05-30 NOTE — CARE PLAN
Pt. Was capped for about 3.5 hours,  Pt. Complained he couldn't breathe,  Capped removed and placed on Fio2 of 28%-t- piece.      Problem: Bronchopulmonary Hygiene:  Goal: Increase mobilization of retained secretions  Outcome: Progressing
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
## 2021-05-30 NOTE — CARE PLAN
The patient is Stable - Low risk of patient condition declining or worsening    Shift Goals  Clinical Goals: Monitor and treat SVT if it occurs; Patient will tolerate speaking valve trial  Patient Goals: Ambulate halls x3 with staff assist  Family Goals: Understand ongoing plan of care     Progress made toward(s) clinical / shift goals:    Problem: Psychosocial  Goal: Patient's level of anxiety will decrease  Outcome: Progressing  Goal: Patient's ability to verbalize feelings about condition will improve  Outcome: Progressing  Goal: Patient's ability to re-evaluate and adapt role responsibilities will improve  Outcome: Progressing  Goal: Spiritual and cultural needs incorporated into hospitalization  Note: Reassurance and emotional support provided.  Encouraging patient to express feelings/concerns via written communication with RN and staff     Problem: Respiratory  Goal: Patient will achieve/maintain optimum respiratory ventilation and gas exchange  Outcome: Progressing  Note: Monitoring SpO2 trends  Collaboration in place with RT regarding vent settings, SATs and SBTs   Monitoring serial cxr results  VAE prevention in place with bundle  Oral care and suctioning provided q2h and prn.       Patient is not progressing towards the following goals: n/a

## 2021-05-30 NOTE — CARE PLAN
Problem: Pain - Standard  Goal: Alleviation of pain or a reduction in pain to the patient’s comfort goal  Outcome: Progressing   Pt's pain will decrease to comfort goal through rest, repositioning, a quiet environment, and PRN pharmacologic analgesia.    Problem: Psychosocial  Goal: Patient's ability to identify and develop effective coping behaviors will improve  Outcome: Progressing  Engaging with family and staff, participating in plan of care, ambulated hallway   The patient is Stable - Low risk of patient condition declining or worsening    Shift Goals  Clinical Goals: pt will not have suicidal ideation during shift  Patient Goals: pain control   Family Goals: emotional support     Progress made toward(s) clinical / shift goals:  progressing
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
## 2021-05-31 ENCOUNTER — APPOINTMENT (OUTPATIENT)
Dept: RADIOLOGY | Facility: MEDICAL CENTER | Age: 51
DRG: 003 | End: 2021-05-31
Attending: SURGERY
Payer: MEDICAID

## 2021-05-31 PROBLEM — R79.89 ELEVATED LACTIC ACID LEVEL: Status: RESOLVED | Noted: 2021-05-24 | Resolved: 2021-05-31

## 2021-05-31 PROBLEM — I95.89 HYPOTENSION DUE TO BLOOD LOSS: Status: RESOLVED | Noted: 2021-05-24 | Resolved: 2021-05-31

## 2021-05-31 PROBLEM — R58 HYPOTENSION DUE TO BLOOD LOSS: Status: RESOLVED | Noted: 2021-05-24 | Resolved: 2021-05-31

## 2021-05-31 LAB
ALBUMIN SERPL BCP-MCNC: 3.3 G/DL (ref 3.2–4.9)
ALBUMIN/GLOB SERPL: 1.1 G/DL
ALP SERPL-CCNC: 67 U/L (ref 30–99)
ALT SERPL-CCNC: 37 U/L (ref 2–50)
ANION GAP SERPL CALC-SCNC: 11 MMOL/L (ref 7–16)
AST SERPL-CCNC: 43 U/L (ref 12–45)
BASOPHILS # BLD AUTO: 0.3 % (ref 0–1.8)
BASOPHILS # BLD: 0.03 K/UL (ref 0–0.12)
BILIRUB SERPL-MCNC: 0.4 MG/DL (ref 0.1–1.5)
BUN SERPL-MCNC: 16 MG/DL (ref 8–22)
CALCIUM SERPL-MCNC: 8.6 MG/DL (ref 8.5–10.5)
CHLORIDE SERPL-SCNC: 102 MMOL/L (ref 96–112)
CO2 SERPL-SCNC: 28 MMOL/L (ref 20–33)
CREAT SERPL-MCNC: 0.7 MG/DL (ref 0.5–1.4)
EOSINOPHIL # BLD AUTO: 0 K/UL (ref 0–0.51)
EOSINOPHIL NFR BLD: 0 % (ref 0–6.9)
ERYTHROCYTE [DISTWIDTH] IN BLOOD BY AUTOMATED COUNT: 46.4 FL (ref 35.9–50)
GLOBULIN SER CALC-MCNC: 2.9 G/DL (ref 1.9–3.5)
GLUCOSE SERPL-MCNC: 114 MG/DL (ref 65–99)
HCT VFR BLD AUTO: 32.5 % (ref 42–52)
HGB BLD-MCNC: 10.6 G/DL (ref 14–18)
IMM GRANULOCYTES # BLD AUTO: 0.1 K/UL (ref 0–0.11)
IMM GRANULOCYTES NFR BLD AUTO: 1 % (ref 0–0.9)
LYMPHOCYTES # BLD AUTO: 1.46 K/UL (ref 1–4.8)
LYMPHOCYTES NFR BLD: 14.5 % (ref 22–41)
MAGNESIUM SERPL-MCNC: 2.3 MG/DL (ref 1.5–2.5)
MCH RBC QN AUTO: 31.3 PG (ref 27–33)
MCHC RBC AUTO-ENTMCNC: 32.6 G/DL (ref 33.7–35.3)
MCV RBC AUTO: 95.9 FL (ref 81.4–97.8)
MONOCYTES # BLD AUTO: 1.72 K/UL (ref 0–0.85)
MONOCYTES NFR BLD AUTO: 17.1 % (ref 0–13.4)
NEUTROPHILS # BLD AUTO: 6.74 K/UL (ref 1.82–7.42)
NEUTROPHILS NFR BLD: 67.1 % (ref 44–72)
NRBC # BLD AUTO: 0 K/UL
NRBC BLD-RTO: 0 /100 WBC
PHOSPHATE SERPL-MCNC: 3.8 MG/DL (ref 2.5–4.5)
PLATELET # BLD AUTO: 268 K/UL (ref 164–446)
PMV BLD AUTO: 10.2 FL (ref 9–12.9)
POTASSIUM SERPL-SCNC: 4.2 MMOL/L (ref 3.6–5.5)
PROT SERPL-MCNC: 6.2 G/DL (ref 6–8.2)
RBC # BLD AUTO: 3.39 M/UL (ref 4.7–6.1)
SODIUM SERPL-SCNC: 141 MMOL/L (ref 135–145)
WBC # BLD AUTO: 10.1 K/UL (ref 4.8–10.8)

## 2021-05-31 PROCEDURE — 85025 COMPLETE CBC W/AUTO DIFF WBC: CPT

## 2021-05-31 PROCEDURE — 700102 HCHG RX REV CODE 250 W/ 637 OVERRIDE(OP): Performed by: NURSE PRACTITIONER

## 2021-05-31 PROCEDURE — 700102 HCHG RX REV CODE 250 W/ 637 OVERRIDE(OP): Performed by: SURGERY

## 2021-05-31 PROCEDURE — 94760 N-INVAS EAR/PLS OXIMETRY 1: CPT

## 2021-05-31 PROCEDURE — 700105 HCHG RX REV CODE 258: Performed by: SURGERY

## 2021-05-31 PROCEDURE — 99233 SBSQ HOSP IP/OBS HIGH 50: CPT | Performed by: SURGERY

## 2021-05-31 PROCEDURE — 94640 AIRWAY INHALATION TREATMENT: CPT

## 2021-05-31 PROCEDURE — A9270 NON-COVERED ITEM OR SERVICE: HCPCS | Performed by: SURGERY

## 2021-05-31 PROCEDURE — 97165 OT EVAL LOW COMPLEX 30 MIN: CPT

## 2021-05-31 PROCEDURE — 97162 PT EVAL MOD COMPLEX 30 MIN: CPT

## 2021-05-31 PROCEDURE — 700111 HCHG RX REV CODE 636 W/ 250 OVERRIDE (IP): Performed by: SURGERY

## 2021-05-31 PROCEDURE — 71045 X-RAY EXAM CHEST 1 VIEW: CPT

## 2021-05-31 PROCEDURE — 84100 ASSAY OF PHOSPHORUS: CPT

## 2021-05-31 PROCEDURE — 83735 ASSAY OF MAGNESIUM: CPT

## 2021-05-31 PROCEDURE — 80053 COMPREHEN METABOLIC PANEL: CPT

## 2021-05-31 PROCEDURE — 770001 HCHG ROOM/CARE - MED/SURG/GYN PRIV*

## 2021-05-31 PROCEDURE — A9270 NON-COVERED ITEM OR SERVICE: HCPCS | Performed by: NURSE PRACTITIONER

## 2021-05-31 RX ORDER — CHLORHEXIDINE GLUCONATE ORAL RINSE 1.2 MG/ML
15 SOLUTION DENTAL 2 TIMES DAILY
Status: DISCONTINUED | OUTPATIENT
Start: 2021-05-31 | End: 2021-07-01 | Stop reason: HOSPADM

## 2021-05-31 RX ORDER — CARBOXYMETHYLCELLULOSE SODIUM 5 MG/ML
1 SOLUTION/ DROPS OPHTHALMIC
Status: DISCONTINUED | OUTPATIENT
Start: 2021-05-31 | End: 2021-06-23

## 2021-05-31 RX ADMIN — POLYETHYLENE GLYCOL 3350 1 PACKET: 17 POWDER, FOR SOLUTION ORAL at 17:59

## 2021-05-31 RX ADMIN — AMPICILLIN SODIUM AND SULBACTAM SODIUM 3 G: 2; 1 INJECTION, POWDER, FOR SOLUTION INTRAMUSCULAR; INTRAVENOUS at 17:59

## 2021-05-31 RX ADMIN — FAMOTIDINE 20 MG: 20 TABLET ORAL at 05:36

## 2021-05-31 RX ADMIN — ENOXAPARIN SODIUM 40 MG: 40 INJECTION SUBCUTANEOUS at 05:36

## 2021-05-31 RX ADMIN — OXYCODONE 5 MG: 5 TABLET ORAL at 05:36

## 2021-05-31 RX ADMIN — AMIODARONE HYDROCHLORIDE 400 MG: 200 TABLET ORAL at 09:09

## 2021-05-31 RX ADMIN — AMPICILLIN SODIUM AND SULBACTAM SODIUM 3 G: 2; 1 INJECTION, POWDER, FOR SOLUTION INTRAMUSCULAR; INTRAVENOUS at 11:38

## 2021-05-31 RX ADMIN — OXYCODONE HYDROCHLORIDE 10 MG: 10 TABLET ORAL at 00:41

## 2021-05-31 RX ADMIN — AMPICILLIN SODIUM AND SULBACTAM SODIUM 3 G: 2; 1 INJECTION, POWDER, FOR SOLUTION INTRAMUSCULAR; INTRAVENOUS at 00:17

## 2021-05-31 RX ADMIN — CARBOXYMETHYLCELLULOSE SODIUM 1 DROP: 5 SOLUTION/ DROPS OPHTHALMIC at 11:39

## 2021-05-31 RX ADMIN — POLYETHYLENE GLYCOL 3350 1 PACKET: 17 POWDER, FOR SOLUTION ORAL at 05:36

## 2021-05-31 RX ADMIN — AMPICILLIN SODIUM AND SULBACTAM SODIUM 3 G: 2; 1 INJECTION, POWDER, FOR SOLUTION INTRAMUSCULAR; INTRAVENOUS at 05:37

## 2021-05-31 RX ADMIN — OXYCODONE HYDROCHLORIDE 10 MG: 10 TABLET ORAL at 18:00

## 2021-05-31 RX ADMIN — AMIODARONE HYDROCHLORIDE 400 MG: 200 TABLET ORAL at 21:42

## 2021-05-31 RX ADMIN — CHLORHEXIDINE GLUCONATE 0.12% ORAL RINSE 15 ML: 1.2 LIQUID ORAL at 18:00

## 2021-05-31 RX ADMIN — OXYCODONE HYDROCHLORIDE 10 MG: 10 TABLET ORAL at 15:00

## 2021-05-31 RX ADMIN — DOCUSATE SODIUM 100 MG: 50 LIQUID ORAL at 18:00

## 2021-05-31 RX ADMIN — OXYCODONE HYDROCHLORIDE 10 MG: 10 TABLET ORAL at 21:42

## 2021-05-31 ASSESSMENT — PATIENT HEALTH QUESTIONNAIRE - PHQ9
1. LITTLE INTEREST OR PLEASURE IN DOING THINGS: SEVERAL DAYS
6. FEELING BAD ABOUT YOURSELF - OR THAT YOU ARE A FAILURE OR HAVE LET YOURSELF OR YOUR FAMILY DOWN: NEARLY EVERY DAY
5. POOR APPETITE OR OVEREATING: NOT AT ALL
9. THOUGHTS THAT YOU WOULD BE BETTER OFF DEAD, OR OF HURTING YOURSELF: NEARLY EVERY DAY
8. MOVING OR SPEAKING SO SLOWLY THAT OTHER PEOPLE COULD HAVE NOTICED. OR THE OPPOSITE, BEING SO FIGETY OR RESTLESS THAT YOU HAVE BEEN MOVING AROUND A LOT MORE THAN USUAL: NOT AT ALL
2. FEELING DOWN, DEPRESSED, IRRITABLE, OR HOPELESS: NEARLY EVERY DAY
SUM OF ALL RESPONSES TO PHQ QUESTIONS 1-9: 16
7. TROUBLE CONCENTRATING ON THINGS, SUCH AS READING THE NEWSPAPER OR WATCHING TELEVISION: MORE THAN HALF THE DAYS
4. FEELING TIRED OR HAVING LITTLE ENERGY: MORE THAN HALF THE DAYS
SUM OF ALL RESPONSES TO PHQ9 QUESTIONS 1 AND 2: 4
3. TROUBLE FALLING OR STAYING ASLEEP OR SLEEPING TOO MUCH: MORE THAN HALF THE DAYS

## 2021-05-31 ASSESSMENT — PAIN DESCRIPTION - PAIN TYPE
TYPE: ACUTE PAIN

## 2021-05-31 ASSESSMENT — COGNITIVE AND FUNCTIONAL STATUS - GENERAL
MOVING TO AND FROM BED TO CHAIR: A LITTLE
SUGGESTED CMS G CODE MODIFIER DAILY ACTIVITY: CJ
WALKING IN HOSPITAL ROOM: A LITTLE
MOVING TO AND FROM BED TO CHAIR: A LITTLE
PERSONAL GROOMING: A LOT
DRESSING REGULAR LOWER BODY CLOTHING: A LOT
EATING MEALS: TOTAL
WALKING IN HOSPITAL ROOM: A LITTLE
EATING MEALS: TOTAL
SUGGESTED CMS G CODE MODIFIER MOBILITY: CK
HELP NEEDED FOR BATHING: A LOT
STANDING UP FROM CHAIR USING ARMS: A LITTLE
CLIMB 3 TO 5 STEPS WITH RAILING: A LOT
MOVING FROM LYING ON BACK TO SITTING ON SIDE OF FLAT BED: A LITTLE
TURNING FROM BACK TO SIDE WHILE IN FLAT BAD: A LITTLE
CLIMB 3 TO 5 STEPS WITH RAILING: A LOT
MOBILITY SCORE: 17
DRESSING REGULAR UPPER BODY CLOTHING: A LOT
MOVING FROM LYING ON BACK TO SITTING ON SIDE OF FLAT BED: A LITTLE
DAILY ACTIVITIY SCORE: 21

## 2021-05-31 ASSESSMENT — LIFESTYLE VARIABLES
TOTAL SCORE: 2
AVERAGE NUMBER OF DAYS PER WEEK YOU HAVE A DRINK CONTAINING ALCOHOL: 4
EVER FELT BAD OR GUILTY ABOUT YOUR DRINKING: YES
HAVE YOU EVER FELT YOU SHOULD CUT DOWN ON YOUR DRINKING: YES
ON A TYPICAL DAY WHEN YOU DRINK ALCOHOL HOW MANY DRINKS DO YOU HAVE: 16
TOTAL SCORE: 2
ALCOHOL_USE: YES
CONSUMPTION TOTAL: POSITIVE
DOES PATIENT WANT TO STOP DRINKING: YES
DOES PATIENT WANT TO TALK TO SOMEONE ABOUT QUITTING: NO
EVER HAD A DRINK FIRST THING IN THE MORNING TO STEADY YOUR NERVES TO GET RID OF A HANGOVER: NO
HAVE PEOPLE ANNOYED YOU BY CRITICIZING YOUR DRINKING: NO
HOW MANY TIMES IN THE PAST YEAR HAVE YOU HAD 5 OR MORE DRINKS IN A DAY: 5
TOTAL SCORE: 2

## 2021-05-31 ASSESSMENT — ENCOUNTER SYMPTOMS
BLURRED VISION: 1
VOMITING: 0
EYE PAIN: 1
HEADACHES: 0
COUGH: 0
SHORTNESS OF BREATH: 0
FEVER: 0
NECK PAIN: 1
SENSORY CHANGE: 0
NAUSEA: 0

## 2021-05-31 ASSESSMENT — GAIT ASSESSMENTS
DISTANCE (FEET): 100
DEVIATION: BRADYKINETIC;SHUFFLED GAIT;DECREASED HEEL STRIKE;DECREASED TOE OFF

## 2021-05-31 ASSESSMENT — ACTIVITIES OF DAILY LIVING (ADL): TOILETING: INDEPENDENT
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
## 2021-05-31 NOTE — THERAPY
Missed Therapy     Patient Name: Greg Tovar  Age:  51 y.o., Sex:  male  Medical Record #: [id number]  Today's Date: 5/31/2021    Discussed missed therapy with RN       05/31/21 1416   Treatment Variance   Reason For Missed Therapy Non-Medical - Other (Please Comment)   Interdisciplinary Plan of Care Collaboration   Collaboration Comments Attempted to see the patient for placement of the speaking valve on this date.  Patient declined as he had just attempted capping trials but was unsuccessful.  Patient agreeable to trial speaking valve placement tomorrow.

## 2021-05-31 NOTE — CARE PLAN
Respiratory Update    Treatment modality:  Pt. On Trach Isra at 10 lpm and 40%.  Increased Fio2 to 40% due to desaturation.        Pt tolerating current treatments well with no adverse reactions.
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
## 2021-05-31 NOTE — CARE PLAN
Problem: Pain - Standard  Goal: Alleviation of pain or a reduction in pain to the patient’s comfort goal  Outcome: Progressing     Problem: Psychosocial  Goal: Patient's ability to identify and develop effective coping behaviors will improve  Outcome: Progressing     Problem: Respiratory  Goal: Patient will achieve/maintain optimum respiratory ventilation and gas exchange  Outcome: Progressing     Problem: Mobility  Goal: Patient's capacity to carry out activities will improve  Outcome: Progressing

## 2021-06-01 ENCOUNTER — HOSPITAL ENCOUNTER (OUTPATIENT)
Dept: RADIOLOGY | Facility: MEDICAL CENTER | Age: 51
End: 2021-06-01
Attending: SURGERY
Payer: MEDICAID

## 2021-06-01 LAB
ALBUMIN SERPL BCP-MCNC: 3.2 G/DL (ref 3.2–4.9)
ALBUMIN/GLOB SERPL: 1 G/DL
ALP SERPL-CCNC: 71 U/L (ref 30–99)
ALT SERPL-CCNC: 40 U/L (ref 2–50)
ANION GAP SERPL CALC-SCNC: 7 MMOL/L (ref 7–16)
AST SERPL-CCNC: 33 U/L (ref 12–45)
BASOPHILS # BLD AUTO: 0.9 % (ref 0–1.8)
BASOPHILS # BLD: 0.09 K/UL (ref 0–0.12)
BILIRUB SERPL-MCNC: 0.3 MG/DL (ref 0.1–1.5)
BUN SERPL-MCNC: 18 MG/DL (ref 8–22)
CALCIUM SERPL-MCNC: 9.2 MG/DL (ref 8.5–10.5)
CHLORIDE SERPL-SCNC: 100 MMOL/L (ref 96–112)
CO2 SERPL-SCNC: 33 MMOL/L (ref 20–33)
CREAT SERPL-MCNC: 0.73 MG/DL (ref 0.5–1.4)
EOSINOPHIL # BLD AUTO: 0.25 K/UL (ref 0–0.51)
EOSINOPHIL NFR BLD: 2.6 % (ref 0–6.9)
ERYTHROCYTE [DISTWIDTH] IN BLOOD BY AUTOMATED COUNT: 45.4 FL (ref 35.9–50)
GLOBULIN SER CALC-MCNC: 3.1 G/DL (ref 1.9–3.5)
GLUCOSE SERPL-MCNC: 114 MG/DL (ref 65–99)
HCT VFR BLD AUTO: 31.7 % (ref 42–52)
HGB BLD-MCNC: 10.3 G/DL (ref 14–18)
LYMPHOCYTES # BLD AUTO: 1.32 K/UL (ref 1–4.8)
LYMPHOCYTES NFR BLD: 13.9 % (ref 22–41)
MANUAL DIFF BLD: NORMAL
MCH RBC QN AUTO: 30.8 PG (ref 27–33)
MCHC RBC AUTO-ENTMCNC: 32.5 G/DL (ref 33.7–35.3)
MCV RBC AUTO: 94.9 FL (ref 81.4–97.8)
MONOCYTES # BLD AUTO: 1.65 K/UL (ref 0–0.85)
MONOCYTES NFR BLD AUTO: 17.4 % (ref 0–13.4)
MORPHOLOGY BLD-IMP: NORMAL
MYELOCYTES NFR BLD MANUAL: 0.9 %
NEUTROPHILS # BLD AUTO: 6.11 K/UL (ref 1.82–7.42)
NEUTROPHILS NFR BLD: 58.2 % (ref 44–72)
NEUTS BAND NFR BLD MANUAL: 6.1 % (ref 0–10)
NRBC # BLD AUTO: 0 K/UL
NRBC BLD-RTO: 0 /100 WBC
PLATELET # BLD AUTO: 311 K/UL (ref 164–446)
PLATELET BLD QL SMEAR: NORMAL
PMV BLD AUTO: 9.6 FL (ref 9–12.9)
POTASSIUM SERPL-SCNC: 4.2 MMOL/L (ref 3.6–5.5)
PROT SERPL-MCNC: 6.3 G/DL (ref 6–8.2)
RBC # BLD AUTO: 3.34 M/UL (ref 4.7–6.1)
RBC BLD AUTO: NORMAL
SODIUM SERPL-SCNC: 140 MMOL/L (ref 135–145)
WBC # BLD AUTO: 9.5 K/UL (ref 4.8–10.8)

## 2021-06-01 PROCEDURE — 700102 HCHG RX REV CODE 250 W/ 637 OVERRIDE(OP): Performed by: SURGERY

## 2021-06-01 PROCEDURE — 700111 HCHG RX REV CODE 636 W/ 250 OVERRIDE (IP): Performed by: SURGERY

## 2021-06-01 PROCEDURE — 92507 TX SP LANG VOICE COMM INDIV: CPT

## 2021-06-01 PROCEDURE — A9270 NON-COVERED ITEM OR SERVICE: HCPCS | Performed by: SURGERY

## 2021-06-01 PROCEDURE — 99232 SBSQ HOSP IP/OBS MODERATE 35: CPT | Performed by: SURGERY

## 2021-06-01 PROCEDURE — 94640 AIRWAY INHALATION TREATMENT: CPT

## 2021-06-01 PROCEDURE — 85027 COMPLETE CBC AUTOMATED: CPT

## 2021-06-01 PROCEDURE — 94760 N-INVAS EAR/PLS OXIMETRY 1: CPT

## 2021-06-01 PROCEDURE — 770001 HCHG ROOM/CARE - MED/SURG/GYN PRIV*

## 2021-06-01 PROCEDURE — 80053 COMPREHEN METABOLIC PANEL: CPT

## 2021-06-01 PROCEDURE — 85007 BL SMEAR W/DIFF WBC COUNT: CPT

## 2021-06-01 PROCEDURE — 71045 X-RAY EXAM CHEST 1 VIEW: CPT

## 2021-06-01 PROCEDURE — A9270 NON-COVERED ITEM OR SERVICE: HCPCS | Performed by: NURSE PRACTITIONER

## 2021-06-01 PROCEDURE — 700102 HCHG RX REV CODE 250 W/ 637 OVERRIDE(OP): Performed by: NURSE PRACTITIONER

## 2021-06-01 RX ADMIN — OXYCODONE HYDROCHLORIDE 10 MG: 10 TABLET ORAL at 20:27

## 2021-06-01 RX ADMIN — CARBOXYMETHYLCELLULOSE SODIUM 1 DROP: 5 SOLUTION/ DROPS OPHTHALMIC at 05:11

## 2021-06-01 RX ADMIN — ENOXAPARIN SODIUM 40 MG: 40 INJECTION SUBCUTANEOUS at 05:11

## 2021-06-01 RX ADMIN — OXYCODONE HYDROCHLORIDE 10 MG: 10 TABLET ORAL at 04:36

## 2021-06-01 RX ADMIN — OXYCODONE HYDROCHLORIDE 10 MG: 10 TABLET ORAL at 10:30

## 2021-06-01 RX ADMIN — CHLORHEXIDINE GLUCONATE 0.12% ORAL RINSE 15 ML: 1.2 LIQUID ORAL at 05:11

## 2021-06-01 RX ADMIN — AMIODARONE HYDROCHLORIDE 400 MG: 200 TABLET ORAL at 20:25

## 2021-06-01 RX ADMIN — CHLORHEXIDINE GLUCONATE 0.12% ORAL RINSE 15 ML: 1.2 LIQUID ORAL at 17:21

## 2021-06-01 RX ADMIN — OXYCODONE HYDROCHLORIDE 10 MG: 10 TABLET ORAL at 17:22

## 2021-06-01 RX ADMIN — AMIODARONE HYDROCHLORIDE 400 MG: 200 TABLET ORAL at 10:29

## 2021-06-01 RX ADMIN — MAGNESIUM HYDROXIDE 30 ML: 400 SUSPENSION ORAL at 05:11

## 2021-06-01 RX ADMIN — POLYETHYLENE GLYCOL 3350 1 PACKET: 17 POWDER, FOR SOLUTION ORAL at 05:11

## 2021-06-01 RX ADMIN — DOCUSATE SODIUM 100 MG: 50 LIQUID ORAL at 05:11

## 2021-06-01 ASSESSMENT — PAIN DESCRIPTION - PAIN TYPE
TYPE: ACUTE PAIN

## 2021-06-01 ASSESSMENT — ENCOUNTER SYMPTOMS
NECK PAIN: 1
COUGH: 0
SENSORY CHANGE: 0
BLURRED VISION: 1
FEVER: 0
HEADACHES: 0
VOMITING: 0
SHORTNESS OF BREATH: 0
EYE PAIN: 1
NAUSEA: 0

## 2021-06-01 NOTE — CARE PLAN
Shift Goals  Clinical Goals: Patient will tolerate capping trials for 1 hour  Patient Goals: Ambulate around unit 5 times today  Family Goals: Understand plan of care    Problem: Pain - Standard  Goal: Alleviation of pain or a reduction in pain to the patient’s comfort goal  Outcome: Progressing     Problem: Psychosocial  Goal: Patient's ability to identify and develop effective coping behaviors will improve  Outcome: Progressing     Problem: Respiratory  Goal: Patient will achieve/maintain optimum respiratory ventilation and gas exchange  Outcome: Progressing     Problem: Mobility  Goal: Patient's capacity to carry out activities will improve  Outcome: Progressing
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
## 2021-06-01 NOTE — CARE PLAN
Respiratory Update    Treatment modality: T-piece 10LPM/ 40%    Trach Day #9    Pt tolerating current treatments well with no adverse reactions.

## 2021-06-01 NOTE — DISCHARGE PLANNING
Anticipated Discharge Disposition: IP psych based on legal hold    Action: Patient discussed in IDT rounds. No case management or dc needs at this time. Hopeful for transfer out of ICU. Patient remains on legal hold and is not medically cleared for psych referrals to be sent.     RPD Case #     Patient has active Medicaid FFS and [place] [employer]     Barriers to Discharge: Medically complex and on legal hold     Plan: Continue to follow and assist.

## 2021-06-01 NOTE — THERAPY
"Speech Language Pathology  Daily Treatment     Patient Name: Greg Tovar  Age:  51 y.o., Sex:  male  Medical Record #: [id number]  Today's Date: 6/1/2021     Precautions  Precautions: Fall Risk, Swallow Precautions ( See Comments), Nasogastric Tube, Tracheostomy   Comments: Telesitter    Assessment    Patient seen for placement of the speaking valve on this date.  Patient pleasant and agreeable.  Per RT, patient unable to tolerate capping trials due to strider with respirations.  Patient on trach mask with deflated cuff upon entering the room.  He tolerated placement of the speaking valve with stable vital signs (HR 77, SpO2 95% on 5L/30% FiO2).  Patient with audible respirations.  He produced voice after a strong volitional cough.  Due to the severity of damage to the patient's facial structures, speech was minimally intelligible at the single word and short phrase level.  Patient most successful in his communication attempts when utilizing writing.  Left-sided anterior spillage of secretions noted throughout the session but spontaneous swallow also produced.  Patient had one episode of strong cough that popped off the speaking valve but tolerated placement for 15 minutes.  Speaking valve removed per patient request who indicated that breathing was becoming \"tight.\"  No back pressure noted when valve was doffed. Tracheal suctioning completed at the conclusion of the session with minimal thick, yellow secretions suctioned.  Okay to place the speaking valve by trained staff as tolerated.  Please continue to provide direct supervision.  SLP following     Plan    Continue current treatment plan.    Discharge Recommendations: (P) Recommend post-acute placement for additional speech therapy services prior to discharge home     Objective       06/01/21 1025   Voice   Voice X   Modulating Loudness Minimal (4)   Respiration Training Minimal (4)   Counselling / Education  Minimal (4)   Skilled Intervention Compensatory " "Strategies;Verbal Cueing   Patient / Family Goals   Patient / Family Goal #1 \" First step.\"   Goal #1 Outcome Progressing as expected   Short Term Goals   Short Term Goal # 1 The patient will utilize respiratory training strategies to facilitate communication with use of speaking valve at the short phrase level.    Goal Outcome # 1 Progressing as expected         "

## 2021-06-01 NOTE — CARE PLAN
The patient is Stable - Low risk of patient condition declining or worsening    Shift Goals  Clinical Goals: Patient will tolerate capping trials for 1 hour  Patient Goals: Ambulate around unit 5 times today  Family Goals: Understand plan of care    Progress made toward(s) clinical / shift goals:  Patient ambulated hallway 5 times throughout the day using wheelchair push. Provided rest periods in between.     Patient is not progressing towards the following goals: Patient did not tolerate trache being capped, appeared anxious and tachypneic after a couple minutes. Patient tolerated speaking valve on trache for less than one hour.       Problem: Mobility  Goal: Patient's capacity to carry out activities will improve  Flowsheets  Taken 5/31/2021 1716  Mobility:   Encouraged mobilization per interdisciplinary team recommendations   Monitored for signs of activity intolerance   Provided assistive devices   Collaborated with PT/OT   Administered pain management to allow progressive mobilization  Taken 5/31/2021 1600  Activity Performed: (Ambulated baez x3) Ambulate     Problem: Depression  Goal: Patient and family/caregiver will verbalize accurate information about at least two of the possible causes of depression, three-four of the signs and symptoms of depression  Note: Emotional support provided. Collaboration with psychiatrist and psychologist.  1:1 monitoring of patient in place

## 2021-06-02 ENCOUNTER — APPOINTMENT (OUTPATIENT)
Dept: RADIOLOGY | Facility: MEDICAL CENTER | Age: 51
DRG: 003 | End: 2021-06-02
Attending: SURGERY
Payer: MEDICAID

## 2021-06-02 PROBLEM — Z11.9 SCREENING EXAMINATION FOR INFECTIOUS DISEASE: Status: RESOLVED | Noted: 2021-05-24 | Resolved: 2021-06-02

## 2021-06-02 LAB
ANISOCYTOSIS BLD QL SMEAR: ABNORMAL
BASOPHILS # BLD AUTO: 0 % (ref 0–1.8)
BASOPHILS # BLD: 0 K/UL (ref 0–0.12)
EOSINOPHIL # BLD AUTO: 0.34 K/UL (ref 0–0.51)
EOSINOPHIL NFR BLD: 3.5 % (ref 0–6.9)
ERYTHROCYTE [DISTWIDTH] IN BLOOD BY AUTOMATED COUNT: 46.5 FL (ref 35.9–50)
HCT VFR BLD AUTO: 33.9 % (ref 42–52)
HGB BLD-MCNC: 10.5 G/DL (ref 14–18)
LYMPHOCYTES # BLD AUTO: 1.85 K/UL (ref 1–4.8)
LYMPHOCYTES NFR BLD: 19.3 % (ref 22–41)
MACROCYTES BLD QL SMEAR: ABNORMAL
MANUAL DIFF BLD: NORMAL
MCH RBC QN AUTO: 30.3 PG (ref 27–33)
MCHC RBC AUTO-ENTMCNC: 31 G/DL (ref 33.7–35.3)
MCV RBC AUTO: 98 FL (ref 81.4–97.8)
MONOCYTES # BLD AUTO: 0.92 K/UL (ref 0–0.85)
MONOCYTES NFR BLD AUTO: 9.6 % (ref 0–13.4)
MORPHOLOGY BLD-IMP: NORMAL
MYELOCYTES NFR BLD MANUAL: 1.8 %
NEUTROPHILS # BLD AUTO: 6.32 K/UL (ref 1.82–7.42)
NEUTROPHILS NFR BLD: 65.8 % (ref 44–72)
NRBC # BLD AUTO: 0 K/UL
NRBC BLD-RTO: 0 /100 WBC
PLATELET # BLD AUTO: 368 K/UL (ref 164–446)
PLATELET BLD QL SMEAR: NORMAL
PMV BLD AUTO: 9.7 FL (ref 9–12.9)
POLYCHROMASIA BLD QL SMEAR: NORMAL
RBC # BLD AUTO: 3.46 M/UL (ref 4.7–6.1)
RBC BLD AUTO: PRESENT
WBC # BLD AUTO: 9.6 K/UL (ref 4.8–10.8)

## 2021-06-02 PROCEDURE — 97530 THERAPEUTIC ACTIVITIES: CPT

## 2021-06-02 PROCEDURE — 700101 HCHG RX REV CODE 250: Performed by: SURGERY

## 2021-06-02 PROCEDURE — 94640 AIRWAY INHALATION TREATMENT: CPT

## 2021-06-02 PROCEDURE — 97116 GAIT TRAINING THERAPY: CPT

## 2021-06-02 PROCEDURE — 94760 N-INVAS EAR/PLS OXIMETRY 1: CPT

## 2021-06-02 PROCEDURE — 700102 HCHG RX REV CODE 250 W/ 637 OVERRIDE(OP): Performed by: SURGERY

## 2021-06-02 PROCEDURE — A9270 NON-COVERED ITEM OR SERVICE: HCPCS | Performed by: SURGERY

## 2021-06-02 PROCEDURE — 85027 COMPLETE CBC AUTOMATED: CPT

## 2021-06-02 PROCEDURE — A9270 NON-COVERED ITEM OR SERVICE: HCPCS | Performed by: NURSE PRACTITIONER

## 2021-06-02 PROCEDURE — 700111 HCHG RX REV CODE 636 W/ 250 OVERRIDE (IP): Performed by: SURGERY

## 2021-06-02 PROCEDURE — 770001 HCHG ROOM/CARE - MED/SURG/GYN PRIV*

## 2021-06-02 PROCEDURE — 99232 SBSQ HOSP IP/OBS MODERATE 35: CPT | Performed by: SURGERY

## 2021-06-02 PROCEDURE — 71045 X-RAY EXAM CHEST 1 VIEW: CPT

## 2021-06-02 PROCEDURE — 85007 BL SMEAR W/DIFF WBC COUNT: CPT

## 2021-06-02 PROCEDURE — 700102 HCHG RX REV CODE 250 W/ 637 OVERRIDE(OP): Performed by: NURSE PRACTITIONER

## 2021-06-02 RX ORDER — IPRATROPIUM BROMIDE AND ALBUTEROL SULFATE 2.5; .5 MG/3ML; MG/3ML
3 SOLUTION RESPIRATORY (INHALATION)
Status: DISCONTINUED | OUTPATIENT
Start: 2021-06-02 | End: 2021-07-01 | Stop reason: HOSPADM

## 2021-06-02 RX ADMIN — OXYCODONE HYDROCHLORIDE 10 MG: 10 TABLET ORAL at 05:40

## 2021-06-02 RX ADMIN — CHLORHEXIDINE GLUCONATE 0.12% ORAL RINSE 15 ML: 1.2 LIQUID ORAL at 05:33

## 2021-06-02 RX ADMIN — AMIODARONE HYDROCHLORIDE 400 MG: 200 TABLET ORAL at 08:41

## 2021-06-02 RX ADMIN — IPRATROPIUM BROMIDE AND ALBUTEROL SULFATE 3 ML: .5; 2.5 SOLUTION RESPIRATORY (INHALATION) at 18:23

## 2021-06-02 RX ADMIN — OXYCODONE HYDROCHLORIDE 10 MG: 10 TABLET ORAL at 08:40

## 2021-06-02 RX ADMIN — CHLORHEXIDINE GLUCONATE 0.12% ORAL RINSE 15 ML: 1.2 LIQUID ORAL at 18:05

## 2021-06-02 RX ADMIN — IPRATROPIUM BROMIDE AND ALBUTEROL SULFATE 3 ML: .5; 2.5 SOLUTION RESPIRATORY (INHALATION) at 16:02

## 2021-06-02 RX ADMIN — OXYCODONE HYDROCHLORIDE 10 MG: 10 TABLET ORAL at 20:40

## 2021-06-02 RX ADMIN — ENOXAPARIN SODIUM 40 MG: 40 INJECTION SUBCUTANEOUS at 05:33

## 2021-06-02 RX ADMIN — AMIODARONE HYDROCHLORIDE 400 MG: 200 TABLET ORAL at 20:40

## 2021-06-02 RX ADMIN — OXYCODONE HYDROCHLORIDE 10 MG: 10 TABLET ORAL at 15:45

## 2021-06-02 ASSESSMENT — FIBROSIS 4 INDEX: FIB4 SCORE: 0.75

## 2021-06-02 ASSESSMENT — PAIN DESCRIPTION - PAIN TYPE
TYPE: ACUTE PAIN

## 2021-06-02 ASSESSMENT — COGNITIVE AND FUNCTIONAL STATUS - GENERAL
SUGGESTED CMS G CODE MODIFIER MOBILITY: CH
MOBILITY SCORE: 24

## 2021-06-02 ASSESSMENT — ENCOUNTER SYMPTOMS
ROS GI COMMENTS: BM 6/1
CONSTITUTIONAL NEGATIVE: 1
RESPIRATORY NEGATIVE: 1
HEADACHES: 0
NECK PAIN: 1
GASTROINTESTINAL NEGATIVE: 1
EYE PAIN: 1
BLURRED VISION: 1

## 2021-06-02 ASSESSMENT — GAIT ASSESSMENTS
GAIT LEVEL OF ASSIST: SUPERVISED
DISTANCE (FEET): 300

## 2021-06-02 NOTE — THERAPY
Physical Therapy   Daily Treatment     Patient Name: Greg Tovar  Age:  51 y.o., Sex:  male  Medical Record #: [id number]  Today's Date: 6/2/2021     Precautions: Fall Risk, Swallow Precautions ( See Comments), Nasogastric Tube, Tracheostomy     Assessment    Rec'd pt alert, in bed, visiting w/ his mother.  Agreeable to work w/ PT.  He is able to move in/out of bed w/o assist, stand and ambulate 300 ft w/o loss of balance or need of physical/mechanical assist.  Also able to move up/down a step stool 15 times, to simulate the stairs/steps needed to access his apartment.  PT will no longer formally follow but be available for d/c needs.    NV Equipment Recommendations: None  Discharge Recommendations: Anticipate that the patient will have no further physical therapy needs after discharge from the hospital       Objective       06/02/21 1143   Balance   Sitting Balance (Static) Fair +   Sitting Balance (Dynamic) Fair +   Standing Balance (Static) Fair +   Standing Balance (Dynamic) Fair +   Weight Shift Sitting Good   Weight Shift Standing Good   Gait Analysis   Gait Level Of Assist Supervised   Assistive Device None   Distance (Feet) 300   # of Stairs Climbed 15   Level of Assist with Stairs Supervised   Bed Mobility    Supine to Sit Supervised   Sit to Supine Supervised   Scooting Supervised   Rolling Supervised   Functional Mobility   Sit to Stand Supervised   Short Term Goals    Short Term Goal # 1 Pt will amb >300ft without AD, SPV within 6 visits to negotiate community distances at NV.   Goal Outcome # 1 Goal met   Short Term Goal # 2 Pt will ascend/descend FOS with UHR hold and SPV within 6 visits to access apt at [facility].   Goal Outcome # 2 Goal met   Anticipated Discharge Equipment and Recommendations   NV Equipment Recommendations None   Discharge Recommendations Anticipate that the patient will have no further physical therapy needs after discharge from the hospital

## 2021-06-02 NOTE — PROGRESS NOTES
2 RN skin check completed    Areas of concern/Skin observations:  · MARY drain to right lower jaw-no signs infection, cleansed with foam cleanser  · Left eye closed due to drainage crusted over eyelid-warm compresses applied  · Bruising to left bicep  · Sacrum red and blanching-waffle cusion and mepilex in place  · Trach site cleaned and new trach ties applied, optifoam applied due to reddened skin and sputum collection    Devices in use, assessed under and interventions (as appropriate) for skin protection:  · SCDs, BP cuff, SaO2 monitor, trach     Interventions in place such as:   · Pt turning self <2h   · Keeping skin clean and dry  · Use of products such as barrier wipes/cream  · Waffle cushion while OOB: yes  · Bed Type: low air loss  · Mobility: ambulation/up to chair

## 2021-06-02 NOTE — CARE PLAN
Problem: Pain - Standard  Goal: Alleviation of pain or a reduction in pain to the patient’s comfort goal  Outcome: Progressing     Problem: Psychosocial  Goal: Patient's ability to identify and develop effective coping behaviors will improve  Outcome: Progressing     Problem: Respiratory  Goal: Patient will achieve/maintain optimum respiratory ventilation and gas exchange  Outcome: Progressing     The patient is Stable - Low risk of patient condition declining or worsening    Shift Goals: mobilize to max ability, trach care, pulmonary hygiene, express feelings and needs appropriately, maintain safety  Patient Goals: Ambulate around unit 2 times today  Family Goals: Understand plan of care    Progress made toward(s) clinical / shift goals:  yes  Patient is not progressing towards the following goals:

## 2021-06-02 NOTE — CARE PLAN
Respiratory Update    Treatment modality: T-piece 6 LPM/ 30%     Trach Day #10    Pt tolerating current treatments well with no adverse reactions.
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
## 2021-06-03 ENCOUNTER — APPOINTMENT (OUTPATIENT)
Dept: RADIOLOGY | Facility: MEDICAL CENTER | Age: 51
DRG: 003 | End: 2021-06-03
Attending: SURGERY
Payer: MEDICAID

## 2021-06-03 LAB
BASOPHILS # BLD AUTO: 0 % (ref 0–1.8)
BASOPHILS # BLD: 0 K/UL (ref 0–0.12)
EOSINOPHIL # BLD AUTO: 0.34 K/UL (ref 0–0.51)
EOSINOPHIL NFR BLD: 2.6 % (ref 0–6.9)
ERYTHROCYTE [DISTWIDTH] IN BLOOD BY AUTOMATED COUNT: 46.5 FL (ref 35.9–50)
HCT VFR BLD AUTO: 35.7 % (ref 42–52)
HGB BLD-MCNC: 11.3 G/DL (ref 14–18)
LYMPHOCYTES # BLD AUTO: 1.6 K/UL (ref 1–4.8)
LYMPHOCYTES NFR BLD: 12.2 % (ref 22–41)
MAGNESIUM SERPL-MCNC: 2.5 MG/DL (ref 1.5–2.5)
MANUAL DIFF BLD: NORMAL
MCH RBC QN AUTO: 30.8 PG (ref 27–33)
MCHC RBC AUTO-ENTMCNC: 31.7 G/DL (ref 33.7–35.3)
MCV RBC AUTO: 97.3 FL (ref 81.4–97.8)
MONOCYTES # BLD AUTO: 1.24 K/UL (ref 0–0.85)
MONOCYTES NFR BLD AUTO: 9.5 % (ref 0–13.4)
MORPHOLOGY BLD-IMP: NORMAL
MYELOCYTES NFR BLD MANUAL: 0.9 %
NEUTROPHILS # BLD AUTO: 9.8 K/UL (ref 1.82–7.42)
NEUTROPHILS NFR BLD: 74.8 % (ref 44–72)
NRBC # BLD AUTO: 0 K/UL
NRBC BLD-RTO: 0 /100 WBC
PHOSPHATE SERPL-MCNC: 4 MG/DL (ref 2.5–4.5)
PLATELET # BLD AUTO: 419 K/UL (ref 164–446)
PLATELET BLD QL SMEAR: NORMAL
PMV BLD AUTO: 10 FL (ref 9–12.9)
POLYCHROMASIA BLD QL SMEAR: NORMAL
RBC # BLD AUTO: 3.67 M/UL (ref 4.7–6.1)
RBC BLD AUTO: PRESENT
WBC # BLD AUTO: 13.1 K/UL (ref 4.8–10.8)

## 2021-06-03 PROCEDURE — 71045 X-RAY EXAM CHEST 1 VIEW: CPT

## 2021-06-03 PROCEDURE — 84100 ASSAY OF PHOSPHORUS: CPT

## 2021-06-03 PROCEDURE — A9270 NON-COVERED ITEM OR SERVICE: HCPCS | Performed by: NURSE PRACTITIONER

## 2021-06-03 PROCEDURE — 94640 AIRWAY INHALATION TREATMENT: CPT

## 2021-06-03 PROCEDURE — 83735 ASSAY OF MAGNESIUM: CPT

## 2021-06-03 PROCEDURE — A9270 NON-COVERED ITEM OR SERVICE: HCPCS | Performed by: SURGERY

## 2021-06-03 PROCEDURE — 99232 SBSQ HOSP IP/OBS MODERATE 35: CPT | Performed by: SURGERY

## 2021-06-03 PROCEDURE — 90791 PSYCH DIAGNOSTIC EVALUATION: CPT | Performed by: SOCIAL WORKER

## 2021-06-03 PROCEDURE — 700102 HCHG RX REV CODE 250 W/ 637 OVERRIDE(OP): Performed by: SURGERY

## 2021-06-03 PROCEDURE — 770001 HCHG ROOM/CARE - MED/SURG/GYN PRIV*

## 2021-06-03 PROCEDURE — 94760 N-INVAS EAR/PLS OXIMETRY 1: CPT

## 2021-06-03 PROCEDURE — 700102 HCHG RX REV CODE 250 W/ 637 OVERRIDE(OP): Performed by: NURSE PRACTITIONER

## 2021-06-03 PROCEDURE — 85027 COMPLETE CBC AUTOMATED: CPT

## 2021-06-03 PROCEDURE — 85007 BL SMEAR W/DIFF WBC COUNT: CPT

## 2021-06-03 PROCEDURE — 700111 HCHG RX REV CODE 636 W/ 250 OVERRIDE (IP): Performed by: SURGERY

## 2021-06-03 RX ADMIN — AMIODARONE HYDROCHLORIDE 400 MG: 200 TABLET ORAL at 19:41

## 2021-06-03 RX ADMIN — OXYCODONE HYDROCHLORIDE 10 MG: 10 TABLET ORAL at 19:41

## 2021-06-03 RX ADMIN — CHLORHEXIDINE GLUCONATE 0.12% ORAL RINSE 15 ML: 1.2 LIQUID ORAL at 17:15

## 2021-06-03 RX ADMIN — OXYCODONE HYDROCHLORIDE 10 MG: 10 TABLET ORAL at 08:01

## 2021-06-03 RX ADMIN — CHLORHEXIDINE GLUCONATE 0.12% ORAL RINSE 15 ML: 1.2 LIQUID ORAL at 05:22

## 2021-06-03 RX ADMIN — ENOXAPARIN SODIUM 40 MG: 40 INJECTION SUBCUTANEOUS at 05:22

## 2021-06-03 RX ADMIN — AMIODARONE HYDROCHLORIDE 400 MG: 200 TABLET ORAL at 08:01

## 2021-06-03 ASSESSMENT — ENCOUNTER SYMPTOMS
NECK PAIN: 1
EYE PAIN: 1
ROS GI COMMENTS: BM 6/2
BLURRED VISION: 1
CONSTITUTIONAL NEGATIVE: 1
HEADACHES: 0
RESPIRATORY NEGATIVE: 1
GASTROINTESTINAL NEGATIVE: 1

## 2021-06-03 ASSESSMENT — PAIN DESCRIPTION - PAIN TYPE
TYPE: ACUTE PAIN
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
## 2021-06-03 NOTE — PROGRESS NOTES
Cortrak Placement    Tube Team verified patient name and medical record number prior to tube placement.  Cortrak tube (55 inches, 10 Qatari) placed at 79 cm in right nare.  Per Cortrak picture, tube appears to be in the stomach.  Nursing Instructions: Awaiting KUB to confirm placement before use for medications or feeding. Once placement confirmed, flush tube with 30 ml of water, and then remove and save stylet, in patient medication drawer.

## 2021-06-03 NOTE — PROGRESS NOTES
"Report received from SHADE Shen. In report mentions of pt's trach \"plugging\". Respiratory consulted around 1915 and states that she will come to assess. This RN unable to pass suction catheter, no increased work of breathing, O2 sat 97%, pt nodding/writing appropriately, denies difficulty breathing. Dr. Chavez made aware of RT's concerns of plugging. Orders given to downsize trach.   "

## 2021-06-03 NOTE — DISCHARGE PLANNING
Received Order in Response to Request for Court Ordered Involuntary Admission from the court continuing pt until 6/10 at 0900. Sent copy to SHADE Lynne, scanned copy into pt's chart.

## 2021-06-03 NOTE — CARE PLAN
The patient is Stable - Low risk of patient condition declining or worsening    Shift Goals: pulmonary hygiene, ambulate around unit x3, effectively communicate and express needs  Patient Goals: Ambulate around unit 5 times today  Family Goals: Understand plan of care    Progress made toward(s) clinical / shift goals:  yes    Patient is not progressing towards the following goals:

## 2021-06-03 NOTE — CARE PLAN
Problem: Pain - Standard  Goal: Alleviation of pain or a reduction in pain to the patient’s comfort goal  Outcome: Progressing     Problem: Communication  Goal: The ability to communicate needs accurately and effectively will improve  Outcome: Progressing     Problem: Respiratory  Goal: Patient will achieve/maintain optimum respiratory ventilation and gas exchange  Outcome: Progressing     Problem: Mobility  Goal: Patient's capacity to carry out activities will improve  Outcome: Progressing   The patient is Stable - Low risk of patient condition declining or worsening    Shift Goals  Clinical Goals: Patient will tolerate capping trials for 1 hour  Patient Goals: Ambulate around unit 5 times today  Family Goals: Understand plan of care    Progress made toward(s) clinical / shift goals:      Patient is not progressing towards the following goals:

## 2021-06-04 ENCOUNTER — APPOINTMENT (OUTPATIENT)
Dept: RADIOLOGY | Facility: MEDICAL CENTER | Age: 51
DRG: 003 | End: 2021-06-04
Attending: SURGERY
Payer: MEDICAID

## 2021-06-04 ENCOUNTER — APPOINTMENT (OUTPATIENT)
Dept: RADIOLOGY | Facility: MEDICAL CENTER | Age: 51
DRG: 003 | End: 2021-06-04
Attending: NURSE PRACTITIONER
Payer: MEDICAID

## 2021-06-04 LAB
BASOPHILS # BLD AUTO: 0.3 % (ref 0–1.8)
BASOPHILS # BLD: 0.05 K/UL (ref 0–0.12)
EOSINOPHIL # BLD AUTO: 0.11 K/UL (ref 0–0.51)
EOSINOPHIL NFR BLD: 0.8 % (ref 0–6.9)
ERYTHROCYTE [DISTWIDTH] IN BLOOD BY AUTOMATED COUNT: 46.7 FL (ref 35.9–50)
HCT VFR BLD AUTO: 36.9 % (ref 42–52)
HGB BLD-MCNC: 11.4 G/DL (ref 14–18)
IMM GRANULOCYTES # BLD AUTO: 0.12 K/UL (ref 0–0.11)
IMM GRANULOCYTES NFR BLD AUTO: 0.8 % (ref 0–0.9)
LYMPHOCYTES # BLD AUTO: 2.22 K/UL (ref 1–4.8)
LYMPHOCYTES NFR BLD: 15.4 % (ref 22–41)
MCH RBC QN AUTO: 30.6 PG (ref 27–33)
MCHC RBC AUTO-ENTMCNC: 30.9 G/DL (ref 33.7–35.3)
MCV RBC AUTO: 98.9 FL (ref 81.4–97.8)
MONOCYTES # BLD AUTO: 1.59 K/UL (ref 0–0.85)
MONOCYTES NFR BLD AUTO: 11 % (ref 0–13.4)
NEUTROPHILS # BLD AUTO: 10.31 K/UL (ref 1.82–7.42)
NEUTROPHILS NFR BLD: 71.7 % (ref 44–72)
NRBC # BLD AUTO: 0 K/UL
NRBC BLD-RTO: 0 /100 WBC
PLATELET # BLD AUTO: 475 K/UL (ref 164–446)
PMV BLD AUTO: 10.5 FL (ref 9–12.9)
RBC # BLD AUTO: 3.73 M/UL (ref 4.7–6.1)
WBC # BLD AUTO: 14.4 K/UL (ref 4.8–10.8)

## 2021-06-04 PROCEDURE — A9270 NON-COVERED ITEM OR SERVICE: HCPCS | Performed by: SURGERY

## 2021-06-04 PROCEDURE — 99232 SBSQ HOSP IP/OBS MODERATE 35: CPT | Performed by: SURGERY

## 2021-06-04 PROCEDURE — 92507 TX SP LANG VOICE COMM INDIV: CPT

## 2021-06-04 PROCEDURE — 700102 HCHG RX REV CODE 250 W/ 637 OVERRIDE(OP): Performed by: SURGERY

## 2021-06-04 PROCEDURE — 71045 X-RAY EXAM CHEST 1 VIEW: CPT

## 2021-06-04 PROCEDURE — 770001 HCHG ROOM/CARE - MED/SURG/GYN PRIV*

## 2021-06-04 PROCEDURE — 85025 COMPLETE CBC W/AUTO DIFF WBC: CPT

## 2021-06-04 PROCEDURE — 94640 AIRWAY INHALATION TREATMENT: CPT

## 2021-06-04 PROCEDURE — A9270 NON-COVERED ITEM OR SERVICE: HCPCS | Performed by: NURSE PRACTITIONER

## 2021-06-04 PROCEDURE — 700102 HCHG RX REV CODE 250 W/ 637 OVERRIDE(OP): Performed by: NURSE PRACTITIONER

## 2021-06-04 PROCEDURE — 90834 PSYTX W PT 45 MINUTES: CPT | Performed by: PSYCHOLOGIST

## 2021-06-04 PROCEDURE — 700111 HCHG RX REV CODE 636 W/ 250 OVERRIDE (IP): Performed by: SURGERY

## 2021-06-04 RX ORDER — AMIODARONE HYDROCHLORIDE 200 MG/1
200 TABLET ORAL
Status: DISCONTINUED | OUTPATIENT
Start: 2021-06-08 | End: 2021-06-12

## 2021-06-04 RX ORDER — AMIODARONE HYDROCHLORIDE 200 MG/1
200 TABLET ORAL TWICE DAILY
Status: COMPLETED | OUTPATIENT
Start: 2021-06-04 | End: 2021-06-07

## 2021-06-04 RX ADMIN — DOCUSATE SODIUM 50 MG AND SENNOSIDES 8.6 MG 1 TABLET: 8.6; 5 TABLET, FILM COATED ORAL at 20:36

## 2021-06-04 RX ADMIN — CHLORHEXIDINE GLUCONATE 0.12% ORAL RINSE 15 ML: 1.2 LIQUID ORAL at 18:08

## 2021-06-04 RX ADMIN — OXYCODONE HYDROCHLORIDE 10 MG: 10 TABLET ORAL at 09:30

## 2021-06-04 RX ADMIN — OXYCODONE HYDROCHLORIDE 10 MG: 10 TABLET ORAL at 14:10

## 2021-06-04 RX ADMIN — OXYCODONE HYDROCHLORIDE 10 MG: 10 TABLET ORAL at 05:07

## 2021-06-04 RX ADMIN — CHLORHEXIDINE GLUCONATE 0.12% ORAL RINSE 15 ML: 1.2 LIQUID ORAL at 05:07

## 2021-06-04 RX ADMIN — AMIODARONE HYDROCHLORIDE 200 MG: 200 TABLET ORAL at 18:08

## 2021-06-04 RX ADMIN — AMIODARONE HYDROCHLORIDE 400 MG: 200 TABLET ORAL at 09:30

## 2021-06-04 RX ADMIN — ENOXAPARIN SODIUM 40 MG: 40 INJECTION SUBCUTANEOUS at 05:07

## 2021-06-04 RX ADMIN — OXYCODONE HYDROCHLORIDE 10 MG: 10 TABLET ORAL at 18:10

## 2021-06-04 ASSESSMENT — PAIN DESCRIPTION - PAIN TYPE
TYPE: ACUTE PAIN

## 2021-06-04 ASSESSMENT — ENCOUNTER SYMPTOMS
GASTROINTESTINAL NEGATIVE: 1
HEADACHES: 0
RESPIRATORY NEGATIVE: 1
EYE PAIN: 1
ROS GI COMMENTS: BM 6/3
BLURRED VISION: 1
NECK PAIN: 1
CONSTITUTIONAL NEGATIVE: 1
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
## 2021-06-04 NOTE — THERAPY
Speech Language Pathology  Daily Treatment     Patient Name: Greg Tovar  Age:  51 y.o., Sex:  male  Medical Record #: [id number]  Today's Date: 6/4/2021     Precautions  Precautions: Fall Risk, Tracheostomy , Swallow Precautions ( See Comments), Nasogastric Tube  Comments: telesitter    Assessment    Pt seen on this date for speaking valve therapy. Pt currently on 5L at 30% Fi02 via tracheal mask and cuff deflated as this clinician entered the room. Sterile suctioning provided which removed minimal yellow secretions. Prior to speaking valve placement heart rate 76, 02 99%, and respiratory rate 19. Speaking valve placed x4 and pt able to tolerate placement for 1-2 minutes. With all placements, saturations remained above 96%, however, respiratory rate increased to mid-high 30's. Respiratory training provided to assist with tolerance of PMSV, however, unsuccessful for >2 minutes placement. Pt c/o difficulty breathing with all placements, increased coughing noted, and moderate amount of back pressure when PMSV removed. Anterior loss of secretions appreciated through out session. Communication done primarily through writing. Sterile suctioning provided at end of session which removed moderate amounts of yellow secretions. Education provided to pt regarding PMSV and role of SLP and pt demonstrated understanding of education. At this time, recommend placement of PMSV with trained RN or RT with 1:1 supervision.     Plan    recommend placement of PMSV with trained RN or RT with 1:1 supervision    Continue current treatment plan.    Discharge Recommendations: (P) Recommend post-acute placement for additional speech therapy services prior to discharge home       Objective       06/04/21 1409   Vitals   O2 (LPM) 5   O2 Delivery Device Tracheal Mask   Pain 0 - 10 Group   Therapist Pain Assessment Post Activity Pain Same as Prior to Activity;Nurse Notified;7   Written Expression   Dominant Hand Right   Cognitive-Linguistic   Level of  Consciousness Alert   Voice   Voice X   Modulating Loudness Moderate (3)   Respiration Training Severe (2)   Counselling / Education  Severe (2)   Skilled Intervention Compensatory Strategies   Short Term Goals   Short Term Goal # 1 The patient will utilize respiratory training strategies to facilitate communication with use of speaking valve at the short phrase level.    Goal Outcome # 1 Progressing slower than expected

## 2021-06-04 NOTE — PROGRESS NOTES
Cortrak Placement    Tube Team verified patient name and medical record number prior to tube placement.  Cortrak tube (43 inches, 12 Liberian) placed at 85 cm in right nare.  Per Cortrak picture, tube appears to be in the stomach.  Nursing Instructions: Awaiting KUB to confirm placement before use for medications or feeding. Once placement confirmed, flush tube with 30 ml of water, and then remove and save stylet, in patient medication drawer.

## 2021-06-04 NOTE — PROGRESS NOTES
I made attempts to flush cortrak with 30ml of water but was unsuccessful.  I then retraced the cortrak with the guidewire and was able to thread it through until roughly 3 inches were left of the guidewire.  At that time I retracted the cortrak tube a few cm until the guidewire was able to pass successfully.  The cortrak was bridled at 77cm and a repeat KUB was performed.

## 2021-06-04 NOTE — CARE PLAN
Problem: Psychosocial  Goal: Patient's ability to identify and develop effective coping behaviors will improve  Outcome: Progressing     Problem: Pain - Standard  Goal: Alleviation of pain or a reduction in pain to the patient’s comfort goal  Outcome: Progressing     Problem: Respiratory  Goal: Patient will achieve/maintain optimum respiratory ventilation and gas exchange  Outcome: Progressing     Problem: Mobility  Goal: Patient's capacity to carry out activities will improve  Outcome: Progressing   The patient is Stable - Low risk of patient condition declining or worsening    Shift Goals  Clinical Goals: Patient will tolerate capping trials for 1 hour  Patient Goals: Ambulate around unit 5 times today  Family Goals: Understand plan of care    Progress made toward(s) clinical / shift goals:  Ambulating, Tolerating )2 on tracheal mask, pain tolerable with meds,     Patient is not progressing towards the following goals:

## 2021-06-04 NOTE — CARE PLAN
The patient is Stable - Low risk of patient condition declining or worsening    Shift Goals  Clinical Goals: Patient will tolerate capping trials for 1 hour  Patient Goals: Ambulate around unit 5 times today  Family Goals: Understand plan of care    Progress made toward(s) clinical / shift goals:    Problem: Pain - Standard  Goal: Alleviation of pain or a reduction in pain to the patient’s comfort goal  Outcome: Progressing     Problem: Psychosocial  Goal: Patient's level of anxiety will decrease  Outcome: Progressing     Problem: Communication  Goal: The ability to communicate needs accurately and effectively will improve  Outcome: Progressing     Problem: Respiratory  Goal: Patient will achieve/maintain optimum respiratory ventilation and gas exchange  Outcome: Progressing     Problem: Mobility  Goal: Patient's capacity to carry out activities will improve  Outcome: Progressing       Patient is not progressing towards the following goals:

## 2021-06-04 NOTE — CARE PLAN
Problem: Bronchopulmonary Hygiene:  Goal: Increase mobilization of retained secretions  Outcome: Progressing     Problem: Oxygenation:  Goal: Maintain adequate oxygenation dependent on patient condition  Outcome: Progressing       Respiratory Update    Treatment modality: Trach collar 5L @ 30%  Frequency: Q4      Pt tolerating current treatments well with no adverse reactions.

## 2021-06-04 NOTE — DISCHARGE PLANNING
Court Order received.     Patient's legal hold has been continued until 06/10 @ 0900.     Patient not [facility] for referrals for IP psych
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
## 2021-06-05 ENCOUNTER — APPOINTMENT (OUTPATIENT)
Dept: RADIOLOGY | Facility: MEDICAL CENTER | Age: 51
DRG: 003 | End: 2021-06-05
Attending: SURGERY
Payer: MEDICAID

## 2021-06-05 PROBLEM — D72.829 LEUKOCYTOSIS: Status: ACTIVE | Noted: 2021-06-05

## 2021-06-05 LAB
BASOPHILS # BLD AUTO: 0.5 % (ref 0–1.8)
BASOPHILS # BLD: 0.09 K/UL (ref 0–0.12)
EOSINOPHIL # BLD AUTO: 0 K/UL (ref 0–0.51)
EOSINOPHIL NFR BLD: 0 % (ref 0–6.9)
ERYTHROCYTE [DISTWIDTH] IN BLOOD BY AUTOMATED COUNT: 46.1 FL (ref 35.9–50)
GRAM STN SPEC: NORMAL
HCT VFR BLD AUTO: 37.2 % (ref 42–52)
HGB BLD-MCNC: 11.7 G/DL (ref 14–18)
IMM GRANULOCYTES # BLD AUTO: 0.13 K/UL (ref 0–0.11)
IMM GRANULOCYTES NFR BLD AUTO: 0.8 % (ref 0–0.9)
LYMPHOCYTES # BLD AUTO: 2.31 K/UL (ref 1–4.8)
LYMPHOCYTES NFR BLD: 14.1 % (ref 22–41)
MCH RBC QN AUTO: 30.5 PG (ref 27–33)
MCHC RBC AUTO-ENTMCNC: 31.5 G/DL (ref 33.7–35.3)
MCV RBC AUTO: 96.9 FL (ref 81.4–97.8)
MONOCYTES # BLD AUTO: 1.88 K/UL (ref 0–0.85)
MONOCYTES NFR BLD AUTO: 11.5 % (ref 0–13.4)
MRSA DNA SPEC QL NAA+PROBE: NORMAL
NEUTROPHILS # BLD AUTO: 11.97 K/UL (ref 1.82–7.42)
NEUTROPHILS NFR BLD: 73.1 % (ref 44–72)
NRBC # BLD AUTO: 0 K/UL
NRBC BLD-RTO: 0 /100 WBC
PLATELET # BLD AUTO: 516 K/UL (ref 164–446)
PMV BLD AUTO: 9.9 FL (ref 9–12.9)
RBC # BLD AUTO: 3.84 M/UL (ref 4.7–6.1)
SIGNIFICANT IND 70042: NORMAL
SIGNIFICANT IND 70042: NORMAL
SITE SITE: NORMAL
SITE SITE: NORMAL
SOURCE SOURCE: NORMAL
SOURCE SOURCE: NORMAL
WBC # BLD AUTO: 16.4 K/UL (ref 4.8–10.8)

## 2021-06-05 PROCEDURE — 71045 X-RAY EXAM CHEST 1 VIEW: CPT

## 2021-06-05 PROCEDURE — 700102 HCHG RX REV CODE 250 W/ 637 OVERRIDE(OP): Performed by: SURGERY

## 2021-06-05 PROCEDURE — 87106 FUNGI IDENTIFICATION YEAST: CPT

## 2021-06-05 PROCEDURE — A9270 NON-COVERED ITEM OR SERVICE: HCPCS | Performed by: SURGERY

## 2021-06-05 PROCEDURE — 700105 HCHG RX REV CODE 258: Performed by: SURGERY

## 2021-06-05 PROCEDURE — 770001 HCHG ROOM/CARE - MED/SURG/GYN PRIV*

## 2021-06-05 PROCEDURE — 87186 SC STD MICRODIL/AGAR DIL: CPT

## 2021-06-05 PROCEDURE — 85025 COMPLETE CBC W/AUTO DIFF WBC: CPT

## 2021-06-05 PROCEDURE — 87205 SMEAR GRAM STAIN: CPT

## 2021-06-05 PROCEDURE — A9270 NON-COVERED ITEM OR SERVICE: HCPCS | Performed by: NURSE PRACTITIONER

## 2021-06-05 PROCEDURE — 700111 HCHG RX REV CODE 636 W/ 250 OVERRIDE (IP): Performed by: SURGERY

## 2021-06-05 PROCEDURE — 87077 CULTURE AEROBIC IDENTIFY: CPT

## 2021-06-05 PROCEDURE — 87641 MR-STAPH DNA AMP PROBE: CPT

## 2021-06-05 PROCEDURE — 302101 FENESTRATED FOAM: Performed by: SURGERY

## 2021-06-05 PROCEDURE — 700111 HCHG RX REV CODE 636 W/ 250 OVERRIDE (IP): Performed by: NURSE PRACTITIONER

## 2021-06-05 PROCEDURE — 700105 HCHG RX REV CODE 258: Performed by: NURSE PRACTITIONER

## 2021-06-05 PROCEDURE — 94640 AIRWAY INHALATION TREATMENT: CPT

## 2021-06-05 PROCEDURE — 81001 URINALYSIS AUTO W/SCOPE: CPT

## 2021-06-05 PROCEDURE — 87070 CULTURE OTHR SPECIMN AEROBIC: CPT

## 2021-06-05 PROCEDURE — 700102 HCHG RX REV CODE 250 W/ 637 OVERRIDE(OP): Performed by: NURSE PRACTITIONER

## 2021-06-05 PROCEDURE — 99232 SBSQ HOSP IP/OBS MODERATE 35: CPT | Performed by: SURGERY

## 2021-06-05 RX ADMIN — CHLORHEXIDINE GLUCONATE 0.12% ORAL RINSE 15 ML: 1.2 LIQUID ORAL at 17:33

## 2021-06-05 RX ADMIN — PIPERACILLIN AND TAZOBACTAM 3.38 G: 3; .375 INJECTION, POWDER, LYOPHILIZED, FOR SOLUTION INTRAVENOUS; PARENTERAL at 11:27

## 2021-06-05 RX ADMIN — DOCUSATE SODIUM 100 MG: 50 LIQUID ORAL at 05:56

## 2021-06-05 RX ADMIN — PIPERACILLIN SODIUM, TAZOBACTAM SODIUM 3.38 G: 3; .375 INJECTION, POWDER, LYOPHILIZED, FOR SOLUTION INTRAVENOUS at 13:49

## 2021-06-05 RX ADMIN — OXYCODONE HYDROCHLORIDE 10 MG: 10 TABLET ORAL at 23:32

## 2021-06-05 RX ADMIN — VANCOMYCIN HYDROCHLORIDE 2250 MG: 500 INJECTION, POWDER, LYOPHILIZED, FOR SOLUTION INTRAVENOUS at 11:28

## 2021-06-05 RX ADMIN — AMIODARONE HYDROCHLORIDE 200 MG: 200 TABLET ORAL at 17:33

## 2021-06-05 RX ADMIN — OXYCODONE 5 MG: 5 TABLET ORAL at 12:38

## 2021-06-05 RX ADMIN — OXYCODONE HYDROCHLORIDE 10 MG: 10 TABLET ORAL at 07:32

## 2021-06-05 RX ADMIN — DOCUSATE SODIUM 50 MG AND SENNOSIDES 8.6 MG 1 TABLET: 8.6; 5 TABLET, FILM COATED ORAL at 21:52

## 2021-06-05 RX ADMIN — OXYCODONE HYDROCHLORIDE 10 MG: 10 TABLET ORAL at 01:00

## 2021-06-05 RX ADMIN — PIPERACILLIN SODIUM, TAZOBACTAM SODIUM 3.38 G: 3; .375 INJECTION, POWDER, LYOPHILIZED, FOR SOLUTION INTRAVENOUS at 21:52

## 2021-06-05 RX ADMIN — POLYETHYLENE GLYCOL 3350 1 PACKET: 17 POWDER, FOR SOLUTION ORAL at 05:56

## 2021-06-05 RX ADMIN — CHLORHEXIDINE GLUCONATE 0.12% ORAL RINSE 15 ML: 1.2 LIQUID ORAL at 05:56

## 2021-06-05 RX ADMIN — AMIODARONE HYDROCHLORIDE 200 MG: 200 TABLET ORAL at 05:57

## 2021-06-05 RX ADMIN — OXYCODONE 5 MG: 5 TABLET ORAL at 17:33

## 2021-06-05 RX ADMIN — ENOXAPARIN SODIUM 40 MG: 40 INJECTION SUBCUTANEOUS at 05:57

## 2021-06-05 RX ADMIN — VANCOMYCIN HYDROCHLORIDE 1000 MG: 500 INJECTION, POWDER, LYOPHILIZED, FOR SOLUTION INTRAVENOUS at 19:36

## 2021-06-05 ASSESSMENT — ENCOUNTER SYMPTOMS
GASTROINTESTINAL NEGATIVE: 1
CONSTITUTIONAL NEGATIVE: 1
ROS GI COMMENTS: BM 6/4
NECK PAIN: 1
BLURRED VISION: 1
HEADACHES: 0
EYE PAIN: 1
RESPIRATORY NEGATIVE: 1

## 2021-06-05 ASSESSMENT — PAIN DESCRIPTION - PAIN TYPE
TYPE: ACUTE PAIN

## 2021-06-05 ASSESSMENT — FIBROSIS 4 INDEX: FIB4 SCORE: 0.54

## 2021-06-05 NOTE — ASSESSMENT & PLAN NOTE
6/5 Rising WBC. MRSA nasal swab and CXR negative. Neck MARY culture sent. Vancomycin and Zosyn Initiated.   6/6 Neck Kishore Mayberry fluid culture (+) for gram negative rods. DC vancomycin. Continue Zosyn.  6/7 Neck MARY fluid culture (+) for Serratia marcescens, Streptococcus anginosus, and candida albicans.   - DC Zosyn.  Start 7 day course of Diflucan. Start 10 day course of Augmentin Bactrim.   WBC stable. ABX course completed 6/17.  RESOLVED
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
## 2021-06-05 NOTE — CARE PLAN
The patient is Stable - Low risk of patient condition declining or worsening    Shift Goals  Clinical Goals: Begin abx  Patient Goals: Up to chair  Family Goals: Update on plan of care    Progress made toward(s) clinical / shift goals:  Progressing as expected    Patient is not progressing towards the following goals:

## 2021-06-05 NOTE — CARE PLAN
The patient is Watcher - Medium risk of patient condition declining or worsening    Shift Goals  Clinical Goals: Manage trach secretions  Patient Goals: Decrease trach suctioning  Family Goals: Understand plan of care    Progress made toward(s) clinical / shift goals:  yes  Problem: Pain - Standard  Goal: Alleviation of pain or a reduction in pain to the patient’s comfort goal  Outcome: Progressing     Problem: Psychosocial  Goal: Patient's level of anxiety will decrease  Outcome: Progressing       Patient is not progressing towards the following goals:

## 2021-06-05 NOTE — CARE PLAN
Problem: Bronchopulmonary Hygiene:  Goal: Increase mobilization of retained secretions  Outcome: Progressing       Respiratory Update  6.0 sheyla  Treatment modality: Aerosol 5L 30%    Frequency:Q4    Did not tolerate capping trials this morning    Pt tolerating current treatments well with no adverse reactions.

## 2021-06-05 NOTE — CONSULTS
BRIEF PSYCHOLOGY NOTE: Patient seen and assessed. Affect was tired and depressed. Mood was congruent. Met with the patient for 45min of brief psychotherapy focused on risk assessment and crisis intervention. Full note to follow.    Plan:    Legal hold remains extended.     Tele-sitter ok even on the medical floors    No changes to legal hold restrictions    Will follow.
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
## 2021-06-06 ENCOUNTER — APPOINTMENT (OUTPATIENT)
Dept: RADIOLOGY | Facility: MEDICAL CENTER | Age: 51
DRG: 003 | End: 2021-06-06
Attending: SURGERY
Payer: MEDICAID

## 2021-06-06 LAB
BASOPHILS # BLD AUTO: 0.6 % (ref 0–1.8)
BASOPHILS # BLD: 0.08 K/UL (ref 0–0.12)
EOSINOPHIL # BLD AUTO: 0.11 K/UL (ref 0–0.51)
EOSINOPHIL NFR BLD: 0.8 % (ref 0–6.9)
ERYTHROCYTE [DISTWIDTH] IN BLOOD BY AUTOMATED COUNT: 45.4 FL (ref 35.9–50)
HCT VFR BLD AUTO: 34.2 % (ref 42–52)
HGB BLD-MCNC: 10.9 G/DL (ref 14–18)
IMM GRANULOCYTES # BLD AUTO: 0.1 K/UL (ref 0–0.11)
IMM GRANULOCYTES NFR BLD AUTO: 0.7 % (ref 0–0.9)
LYMPHOCYTES # BLD AUTO: 2.19 K/UL (ref 1–4.8)
LYMPHOCYTES NFR BLD: 15.7 % (ref 22–41)
MCH RBC QN AUTO: 30.6 PG (ref 27–33)
MCHC RBC AUTO-ENTMCNC: 31.9 G/DL (ref 33.7–35.3)
MCV RBC AUTO: 96.1 FL (ref 81.4–97.8)
MONOCYTES # BLD AUTO: 1.5 K/UL (ref 0–0.85)
MONOCYTES NFR BLD AUTO: 10.8 % (ref 0–13.4)
NEUTROPHILS # BLD AUTO: 9.94 K/UL (ref 1.82–7.42)
NEUTROPHILS NFR BLD: 71.4 % (ref 44–72)
NRBC # BLD AUTO: 0 K/UL
NRBC BLD-RTO: 0 /100 WBC
PLATELET # BLD AUTO: 485 K/UL (ref 164–446)
PMV BLD AUTO: 9.8 FL (ref 9–12.9)
RBC # BLD AUTO: 3.56 M/UL (ref 4.7–6.1)
WBC # BLD AUTO: 13.9 K/UL (ref 4.8–10.8)

## 2021-06-06 PROCEDURE — 700105 HCHG RX REV CODE 258: Performed by: NURSE PRACTITIONER

## 2021-06-06 PROCEDURE — 700102 HCHG RX REV CODE 250 W/ 637 OVERRIDE(OP): Performed by: SURGERY

## 2021-06-06 PROCEDURE — 94640 AIRWAY INHALATION TREATMENT: CPT

## 2021-06-06 PROCEDURE — 700111 HCHG RX REV CODE 636 W/ 250 OVERRIDE (IP): Performed by: SURGERY

## 2021-06-06 PROCEDURE — 770020 HCHG ROOM/CARE - TELE (206)

## 2021-06-06 PROCEDURE — A9270 NON-COVERED ITEM OR SERVICE: HCPCS | Performed by: SURGERY

## 2021-06-06 PROCEDURE — 700102 HCHG RX REV CODE 250 W/ 637 OVERRIDE(OP): Performed by: NURSE PRACTITIONER

## 2021-06-06 PROCEDURE — 700105 HCHG RX REV CODE 258: Performed by: SURGERY

## 2021-06-06 PROCEDURE — A9270 NON-COVERED ITEM OR SERVICE: HCPCS | Performed by: NURSE PRACTITIONER

## 2021-06-06 PROCEDURE — 71045 X-RAY EXAM CHEST 1 VIEW: CPT

## 2021-06-06 PROCEDURE — 85025 COMPLETE CBC W/AUTO DIFF WBC: CPT

## 2021-06-06 PROCEDURE — 700111 HCHG RX REV CODE 636 W/ 250 OVERRIDE (IP): Performed by: NURSE PRACTITIONER

## 2021-06-06 PROCEDURE — 36415 COLL VENOUS BLD VENIPUNCTURE: CPT

## 2021-06-06 PROCEDURE — 94760 N-INVAS EAR/PLS OXIMETRY 1: CPT

## 2021-06-06 RX ADMIN — CHLORHEXIDINE GLUCONATE 0.12% ORAL RINSE 15 ML: 1.2 LIQUID ORAL at 17:28

## 2021-06-06 RX ADMIN — PIPERACILLIN SODIUM, TAZOBACTAM SODIUM 3.38 G: 3; .375 INJECTION, POWDER, LYOPHILIZED, FOR SOLUTION INTRAVENOUS at 05:14

## 2021-06-06 RX ADMIN — ENOXAPARIN SODIUM 40 MG: 40 INJECTION SUBCUTANEOUS at 05:14

## 2021-06-06 RX ADMIN — POLYETHYLENE GLYCOL 3350 1 PACKET: 17 POWDER, FOR SOLUTION ORAL at 05:14

## 2021-06-06 RX ADMIN — AMIODARONE HYDROCHLORIDE 200 MG: 200 TABLET ORAL at 05:15

## 2021-06-06 RX ADMIN — PIPERACILLIN SODIUM, TAZOBACTAM SODIUM 3.38 G: 3; .375 INJECTION, POWDER, LYOPHILIZED, FOR SOLUTION INTRAVENOUS at 12:16

## 2021-06-06 RX ADMIN — AMIODARONE HYDROCHLORIDE 200 MG: 200 TABLET ORAL at 17:27

## 2021-06-06 RX ADMIN — VANCOMYCIN HYDROCHLORIDE 1000 MG: 500 INJECTION, POWDER, LYOPHILIZED, FOR SOLUTION INTRAVENOUS at 05:14

## 2021-06-06 RX ADMIN — DOCUSATE SODIUM 100 MG: 50 LIQUID ORAL at 05:16

## 2021-06-06 RX ADMIN — CHLORHEXIDINE GLUCONATE 0.12% ORAL RINSE 15 ML: 1.2 LIQUID ORAL at 05:16

## 2021-06-06 RX ADMIN — VANCOMYCIN HYDROCHLORIDE 1000 MG: 500 INJECTION, POWDER, LYOPHILIZED, FOR SOLUTION INTRAVENOUS at 12:17

## 2021-06-06 RX ADMIN — PIPERACILLIN SODIUM, TAZOBACTAM SODIUM 3.38 G: 3; .375 INJECTION, POWDER, LYOPHILIZED, FOR SOLUTION INTRAVENOUS at 21:32

## 2021-06-06 ASSESSMENT — PATIENT HEALTH QUESTIONNAIRE - PHQ9
3. TROUBLE FALLING OR STAYING ASLEEP OR SLEEPING TOO MUCH: MORE THAN HALF THE DAYS
7. TROUBLE CONCENTRATING ON THINGS, SUCH AS READING THE NEWSPAPER OR WATCHING TELEVISION: MORE THAN HALF THE DAYS
5. POOR APPETITE OR OVEREATING: NOT AT ALL
6. FEELING BAD ABOUT YOURSELF - OR THAT YOU ARE A FAILURE OR HAVE LET YOURSELF OR YOUR FAMILY DOWN: NEARLY EVERY DAY
1. LITTLE INTEREST OR PLEASURE IN DOING THINGS: SEVERAL DAYS
2. FEELING DOWN, DEPRESSED, IRRITABLE, OR HOPELESS: NEARLY EVERY DAY
4. FEELING TIRED OR HAVING LITTLE ENERGY: MORE THAN HALF THE DAYS
SUM OF ALL RESPONSES TO PHQ QUESTIONS 1-9: 16
8. MOVING OR SPEAKING SO SLOWLY THAT OTHER PEOPLE COULD HAVE NOTICED. OR THE OPPOSITE, BEING SO FIGETY OR RESTLESS THAT YOU HAVE BEEN MOVING AROUND A LOT MORE THAN USUAL: NOT AT ALL
SUM OF ALL RESPONSES TO PHQ9 QUESTIONS 1 AND 2: 4
9. THOUGHTS THAT YOU WOULD BE BETTER OFF DEAD, OR OF HURTING YOURSELF: NEARLY EVERY DAY

## 2021-06-06 ASSESSMENT — ENCOUNTER SYMPTOMS
CONSTITUTIONAL NEGATIVE: 1
ROS GI COMMENTS: BM 6/6
EYE PAIN: 1
RESPIRATORY NEGATIVE: 1
HEADACHES: 0
GASTROINTESTINAL NEGATIVE: 1
BLURRED VISION: 1
NECK PAIN: 1

## 2021-06-06 ASSESSMENT — PAIN DESCRIPTION - PAIN TYPE
TYPE: ACUTE PAIN
TYPE: ACUTE PAIN
TYPE: ACUTE PAIN;SURGICAL PAIN

## 2021-06-06 NOTE — CARE PLAN
Problem: Bronchopulmonary Hygiene:  Goal: Increase mobilization of retained secretions  Outcome: Progressing     Problem: Oxygenation:  Goal: Maintain adequate oxygenation dependent on patient condition  Outcome: Progressing     Aerosol 5lpm/30%

## 2021-06-06 NOTE — PROGRESS NOTES
Notified Dr. Okeefe that Pt MARY drain was found pulled out. Dr. Okeefe said it is ok to leave drain out.

## 2021-06-06 NOTE — PROGRESS NOTES
Pt arrived to T 429 via wheelchair from SICU  AOx4  Pain rated at 7/10, medicated per MAR  5 L of O2 via trache mask  6.0 Shiley Trache in place, dressing changed and inner cannula changed  Right nare cortrak bridled in place running impact peptide at 60 mL/hr goal, strict NPO   Ambulates with SBA  +void +flatus LBM 6/5  Remote tele monitor in place, monitor room notified  Pt on legal hold, tele sitter in place  Bed alarm on  Pt oriented to room and call light system  Call light within reach  Bed locked and in low position   POC discussed with patient  All needs met at this time.

## 2021-06-06 NOTE — PROGRESS NOTES
Pt refused deep suction at this time. Thick yellow secretions, pt able to cough up most secretions on his own and suctioned with yankauer.

## 2021-06-06 NOTE — CONSULTS
BRIEF PSYCHOLOGY NOTE: Attempted to see the patient for crisis intervention services however he was sleeping soundly. Will follow up with the patient next weekend when this writer returns to the service, Friday 6/11/21. Patient will be seen before then by a qualified mental health professional.

## 2021-06-06 NOTE — RESPIRATORY CARE
06/06/21 Briefly occluded patient airway for tracheal weaning.  Patient felt uncomfortable, but states he would like to try some capping tomorrow.  Patient tolerates suctioning well.  He is able to mobilize secretions by coughing as well.  Currently on Tracheal mask 5LPM at 30% FI02

## 2021-06-06 NOTE — PROGRESS NOTES
2 RN skin check complete  Right neck previous MARY drain site with dressing in place  Incision to left neck SHAISTA  Left eye swollen shut  Trache with dressing in place, dressing changed  Bloody drainage around bilateral nares, cortrak in place to right nare  Bilateral heels dry  Blanchable redness to patient's sacrum   No other skin issues noted.

## 2021-06-06 NOTE — DISCHARGE PLANNING
Notified by Charge RN that original Legal Hold documents did not accompany pt from ICU. Notified ICU LSWJuliana. She will investigate.

## 2021-06-06 NOTE — CARE PLAN
The patient is Watcher - Medium risk of patient condition declining or worsening    Shift Goals  Clinical Goals: Begin abx  Patient Goals: Up to chair  Family Goals: Update on plan of care    Progress made toward(s) clinical / shift goals:  yes    Patient is not progressing towards the following goals:      Problem: Pain - Standard  Goal: Alleviation of pain or a reduction in pain to the patient’s comfort goal  Outcome: Progressing       Problem: Communication  Goal: The ability to communicate needs accurately and effectively will improve  Outcome: Progressing     Problem: Mobility  Goal: Patient's capacity to carry out activities will improve  Outcome: Progressing

## 2021-06-06 NOTE — CARE PLAN
The patient is Watcher - Medium risk of patient condition declining or worsening    Shift Goals  Clinical Goals: Safety  Patient Goals: ambulate  Family Goals: Update on plan of care    Progress made toward(s) clinical / shift goals:  tele sitter in place. Pt able to ambulate to bathroom with staff assistance.     Patient is not progressing towards the following goals:    Problem: Pain - Standard  Goal: Alleviation of pain or a reduction in pain to the patient’s comfort goal  Outcome: Progressing     Problem: Psychosocial  Goal: Patient's ability to identify and develop effective coping behaviors will improve  Outcome: Progressing

## 2021-06-06 NOTE — PROGRESS NOTES
PT able to cough up copious yellow secretions which are then cleared with a yankauer. PT ambulated up to the bathroom many times today, having small, loose greenish BMs. Trach care performed, new tubing and bag of impact peptide hung. Pt given a bed bath. C/o no pain. Pt calls before getting OOB. Pt visited with family for most of the afternoon.
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
## 2021-06-06 NOTE — CARE PLAN
Problem: Pain - Standard  Goal: Alleviation of pain or a reduction in pain to the patient’s comfort goal  Outcome: Progressing   Pt c/o minimal pain at this time  Problem: Communication  Goal: The ability to communicate needs accurately and effectively will improve  Outcome: Progressing  Pt progressing in ability to communicate without speaking

## 2021-06-07 ENCOUNTER — APPOINTMENT (OUTPATIENT)
Dept: RADIOLOGY | Facility: MEDICAL CENTER | Age: 51
DRG: 003 | End: 2021-06-07
Attending: SURGERY
Payer: MEDICAID

## 2021-06-07 PROBLEM — Z78.9 NO CONTRAINDICATION TO DEEP VEIN THROMBOSIS (DVT) PROPHYLAXIS: Status: ACTIVE | Noted: 2021-05-24

## 2021-06-07 LAB
BACTERIA WND AEROBE CULT: ABNORMAL
BASOPHILS # BLD AUTO: 0.6 % (ref 0–1.8)
BASOPHILS # BLD: 0.07 K/UL (ref 0–0.12)
CRP SERPL HS-MCNC: 2.98 MG/DL (ref 0–0.75)
EOSINOPHIL # BLD AUTO: 0.16 K/UL (ref 0–0.51)
EOSINOPHIL NFR BLD: 1.3 % (ref 0–6.9)
ERYTHROCYTE [DISTWIDTH] IN BLOOD BY AUTOMATED COUNT: 44.7 FL (ref 35.9–50)
GRAM STN SPEC: ABNORMAL
HCT VFR BLD AUTO: 35.7 % (ref 42–52)
HGB BLD-MCNC: 11.6 G/DL (ref 14–18)
IMM GRANULOCYTES # BLD AUTO: 0.07 K/UL (ref 0–0.11)
IMM GRANULOCYTES NFR BLD AUTO: 0.6 % (ref 0–0.9)
LYMPHOCYTES # BLD AUTO: 2.25 K/UL (ref 1–4.8)
LYMPHOCYTES NFR BLD: 18.7 % (ref 22–41)
MAGNESIUM SERPL-MCNC: 2.6 MG/DL (ref 1.5–2.5)
MCH RBC QN AUTO: 30.9 PG (ref 27–33)
MCHC RBC AUTO-ENTMCNC: 32.5 G/DL (ref 33.7–35.3)
MCV RBC AUTO: 95.2 FL (ref 81.4–97.8)
MONOCYTES # BLD AUTO: 1.29 K/UL (ref 0–0.85)
MONOCYTES NFR BLD AUTO: 10.7 % (ref 0–13.4)
NEUTROPHILS # BLD AUTO: 8.2 K/UL (ref 1.82–7.42)
NEUTROPHILS NFR BLD: 68.1 % (ref 44–72)
NRBC # BLD AUTO: 0 K/UL
NRBC BLD-RTO: 0 /100 WBC
PHOSPHATE SERPL-MCNC: 4 MG/DL (ref 2.5–4.5)
PLATELET # BLD AUTO: 514 K/UL (ref 164–446)
PMV BLD AUTO: 9.4 FL (ref 9–12.9)
PREALB SERPL-MCNC: 19.1 MG/DL (ref 18–38)
RBC # BLD AUTO: 3.75 M/UL (ref 4.7–6.1)
SIGNIFICANT IND 70042: ABNORMAL
SITE SITE: ABNORMAL
SOURCE SOURCE: ABNORMAL
WBC # BLD AUTO: 12 K/UL (ref 4.8–10.8)

## 2021-06-07 PROCEDURE — 700102 HCHG RX REV CODE 250 W/ 637 OVERRIDE(OP): Performed by: NURSE PRACTITIONER

## 2021-06-07 PROCEDURE — 700102 HCHG RX REV CODE 250 W/ 637 OVERRIDE(OP): Performed by: SURGERY

## 2021-06-07 PROCEDURE — 700105 HCHG RX REV CODE 258: Performed by: NURSE PRACTITIONER

## 2021-06-07 PROCEDURE — 94640 AIRWAY INHALATION TREATMENT: CPT

## 2021-06-07 PROCEDURE — 36415 COLL VENOUS BLD VENIPUNCTURE: CPT

## 2021-06-07 PROCEDURE — A9270 NON-COVERED ITEM OR SERVICE: HCPCS | Performed by: NURSE PRACTITIONER

## 2021-06-07 PROCEDURE — 84100 ASSAY OF PHOSPHORUS: CPT

## 2021-06-07 PROCEDURE — 83735 ASSAY OF MAGNESIUM: CPT

## 2021-06-07 PROCEDURE — 700111 HCHG RX REV CODE 636 W/ 250 OVERRIDE (IP): Performed by: NURSE PRACTITIONER

## 2021-06-07 PROCEDURE — A9270 NON-COVERED ITEM OR SERVICE: HCPCS | Performed by: SURGERY

## 2021-06-07 PROCEDURE — 85025 COMPLETE CBC W/AUTO DIFF WBC: CPT

## 2021-06-07 PROCEDURE — 84134 ASSAY OF PREALBUMIN: CPT

## 2021-06-07 PROCEDURE — 86140 C-REACTIVE PROTEIN: CPT

## 2021-06-07 PROCEDURE — 770020 HCHG ROOM/CARE - TELE (206)

## 2021-06-07 PROCEDURE — 700111 HCHG RX REV CODE 636 W/ 250 OVERRIDE (IP): Performed by: SURGERY

## 2021-06-07 PROCEDURE — 71045 X-RAY EXAM CHEST 1 VIEW: CPT

## 2021-06-07 RX ORDER — SULFAMETHOXAZOLE AND TRIMETHOPRIM 800; 160 MG/1; MG/1
1 TABLET ORAL EVERY 12 HOURS
Status: COMPLETED | OUTPATIENT
Start: 2021-06-07 | End: 2021-06-17

## 2021-06-07 RX ORDER — AMOXICILLIN AND CLAVULANATE POTASSIUM 875; 125 MG/1; MG/1
1 TABLET, FILM COATED ORAL EVERY 12 HOURS
Status: COMPLETED | OUTPATIENT
Start: 2021-06-07 | End: 2021-06-17

## 2021-06-07 RX ORDER — FLUCONAZOLE 200 MG/1
200 TABLET ORAL DAILY
Status: COMPLETED | OUTPATIENT
Start: 2021-06-07 | End: 2021-06-16

## 2021-06-07 RX ADMIN — ACETAMINOPHEN 650 MG: 325 TABLET, FILM COATED ORAL at 15:09

## 2021-06-07 RX ADMIN — AMIODARONE HYDROCHLORIDE 200 MG: 200 TABLET ORAL at 04:42

## 2021-06-07 RX ADMIN — ENOXAPARIN SODIUM 40 MG: 40 INJECTION SUBCUTANEOUS at 04:42

## 2021-06-07 RX ADMIN — AMIODARONE HYDROCHLORIDE 200 MG: 200 TABLET ORAL at 17:49

## 2021-06-07 RX ADMIN — AMOXICILLIN AND CLAVULANATE POTASSIUM 1 TABLET: 875; 125 TABLET, FILM COATED ORAL at 17:49

## 2021-06-07 RX ADMIN — FLUCONAZOLE 200 MG: 200 TABLET ORAL at 15:05

## 2021-06-07 RX ADMIN — OXYCODONE HYDROCHLORIDE 10 MG: 10 TABLET ORAL at 23:42

## 2021-06-07 RX ADMIN — CHLORHEXIDINE GLUCONATE 0.12% ORAL RINSE 15 ML: 1.2 LIQUID ORAL at 17:49

## 2021-06-07 RX ADMIN — PIPERACILLIN SODIUM, TAZOBACTAM SODIUM 3.38 G: 3; .375 INJECTION, POWDER, LYOPHILIZED, FOR SOLUTION INTRAVENOUS at 04:42

## 2021-06-07 RX ADMIN — PIPERACILLIN SODIUM, TAZOBACTAM SODIUM 3.38 G: 3; .375 INJECTION, POWDER, LYOPHILIZED, FOR SOLUTION INTRAVENOUS at 12:06

## 2021-06-07 RX ADMIN — CHLORHEXIDINE GLUCONATE 0.12% ORAL RINSE 15 ML: 1.2 LIQUID ORAL at 04:42

## 2021-06-07 RX ADMIN — DOCUSATE SODIUM 50 MG AND SENNOSIDES 8.6 MG 1 TABLET: 8.6; 5 TABLET, FILM COATED ORAL at 21:48

## 2021-06-07 RX ADMIN — OXYCODONE 5 MG: 5 TABLET ORAL at 08:42

## 2021-06-07 RX ADMIN — SULFAMETHOXAZOLE AND TRIMETHOPRIM 1 TABLET: 800; 160 TABLET ORAL at 17:49

## 2021-06-07 ASSESSMENT — ENCOUNTER SYMPTOMS
HEADACHES: 0
ROS GI COMMENTS: BM 6/7
NECK PAIN: 1
GASTROINTESTINAL NEGATIVE: 1
RESPIRATORY NEGATIVE: 1
CONSTITUTIONAL NEGATIVE: 1
EYE PAIN: 1
BLURRED VISION: 1

## 2021-06-07 ASSESSMENT — PAIN DESCRIPTION - PAIN TYPE
TYPE: ACUTE PAIN

## 2021-06-07 NOTE — DISCHARGE PLANNING
Anticipated Discharge Disposition: [facility]patient [facility].    Action: Artur Brewer RN, the original legal hold document did not accompany the patient from the ICU, LSW notified TYRONE Ramirez via [employer].    Barriers to Discharge: Medical Clearance.    Plan: Continue to monitor.

## 2021-06-07 NOTE — CARE PLAN
Problem: Psychosocial  Goal: Patient's ability to identify and develop effective coping behaviors will improve  Outcome: Progressing   Pt to be seen by psych    Problem: Pain - Standard  Goal: Alleviation of pain or a reduction in pain to the patient’s comfort goal  Outcome: Progressing  PT c/o little pain

## 2021-06-07 NOTE — CONSULTS
BRIEF PSYCHOLOGY NOTE: Attempted to follow up with the patient however he was too tired to participate in the session today. He did briefly wake up when this writer stated his name but then fell asleep while this writer was gathering a pen and paper. Per nursing, he was recently given pain medication and may not have slept very well last night. No suicidality stated however he is here for a severe suicide attempt and is still in the process of identifying a safety and coping plan targeted at coping with the continued stress that led up to his attempt. He will also need a plan for how he will cope with the new stress associated with his attempt such as potential facial disfigurement.     Therefore, the patient's legal hold remains extended. Will follow up with the patient again when this writer returns to the service on Friday, 6/11/21. Patient will also be seen by a qualified mental health professional over the next 72 hours.    Tele-ignacio TRIPP    No changes to legal hold restrictions
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
## 2021-06-08 ENCOUNTER — APPOINTMENT (OUTPATIENT)
Dept: RADIOLOGY | Facility: MEDICAL CENTER | Age: 51
DRG: 003 | End: 2021-06-08
Attending: SURGERY
Payer: MEDICAID

## 2021-06-08 LAB
APPEARANCE UR: ABNORMAL
BACTERIA #/AREA URNS HPF: NEGATIVE /HPF
BASOPHILS # BLD AUTO: 0.6 % (ref 0–1.8)
BASOPHILS # BLD: 0.07 K/UL (ref 0–0.12)
BILIRUB UR QL STRIP.AUTO: NEGATIVE
COLOR UR: YELLOW
EOSINOPHIL # BLD AUTO: 0 K/UL (ref 0–0.51)
EOSINOPHIL NFR BLD: 0 % (ref 0–6.9)
EPI CELLS #/AREA URNS HPF: NEGATIVE /HPF
ERYTHROCYTE [DISTWIDTH] IN BLOOD BY AUTOMATED COUNT: 46.6 FL (ref 35.9–50)
GLUCOSE UR STRIP.AUTO-MCNC: NEGATIVE MG/DL
HCT VFR BLD AUTO: 38.9 % (ref 42–52)
HGB BLD-MCNC: 12 G/DL (ref 14–18)
HYALINE CASTS #/AREA URNS LPF: ABNORMAL /LPF
IMM GRANULOCYTES # BLD AUTO: 0.08 K/UL (ref 0–0.11)
IMM GRANULOCYTES NFR BLD AUTO: 0.6 % (ref 0–0.9)
KETONES UR STRIP.AUTO-MCNC: NEGATIVE MG/DL
LEUKOCYTE ESTERASE UR QL STRIP.AUTO: NEGATIVE
LYMPHOCYTES # BLD AUTO: 2.54 K/UL (ref 1–4.8)
LYMPHOCYTES NFR BLD: 20.6 % (ref 22–41)
MCH RBC QN AUTO: 29.9 PG (ref 27–33)
MCHC RBC AUTO-ENTMCNC: 30.8 G/DL (ref 33.7–35.3)
MCV RBC AUTO: 96.8 FL (ref 81.4–97.8)
MICRO URNS: ABNORMAL
MONOCYTES # BLD AUTO: 1.41 K/UL (ref 0–0.85)
MONOCYTES NFR BLD AUTO: 11.5 % (ref 0–13.4)
NEUTROPHILS # BLD AUTO: 8.21 K/UL (ref 1.82–7.42)
NEUTROPHILS NFR BLD: 66.7 % (ref 44–72)
NITRITE UR QL STRIP.AUTO: NEGATIVE
NRBC # BLD AUTO: 0 K/UL
NRBC BLD-RTO: 0 /100 WBC
PH UR STRIP.AUTO: 6 [PH] (ref 5–8)
PLATELET # BLD AUTO: 559 K/UL (ref 164–446)
PMV BLD AUTO: 9.6 FL (ref 9–12.9)
PROT UR QL STRIP: NEGATIVE MG/DL
RBC # BLD AUTO: 4.02 M/UL (ref 4.7–6.1)
RBC # URNS HPF: ABNORMAL /HPF
RBC UR QL AUTO: NEGATIVE
SP GR UR STRIP.AUTO: 1.02
UROBILINOGEN UR STRIP.AUTO-MCNC: 0.2 MG/DL
WBC # BLD AUTO: 12.3 K/UL (ref 4.8–10.8)
WBC #/AREA URNS HPF: ABNORMAL /HPF

## 2021-06-08 PROCEDURE — 770020 HCHG ROOM/CARE - TELE (206)

## 2021-06-08 PROCEDURE — A9270 NON-COVERED ITEM OR SERVICE: HCPCS | Performed by: SURGERY

## 2021-06-08 PROCEDURE — 700102 HCHG RX REV CODE 250 W/ 637 OVERRIDE(OP): Performed by: NURSE PRACTITIONER

## 2021-06-08 PROCEDURE — 700102 HCHG RX REV CODE 250 W/ 637 OVERRIDE(OP): Performed by: SURGERY

## 2021-06-08 PROCEDURE — 94640 AIRWAY INHALATION TREATMENT: CPT

## 2021-06-08 PROCEDURE — 36415 COLL VENOUS BLD VENIPUNCTURE: CPT

## 2021-06-08 PROCEDURE — A9270 NON-COVERED ITEM OR SERVICE: HCPCS | Performed by: NURSE PRACTITIONER

## 2021-06-08 PROCEDURE — 71045 X-RAY EXAM CHEST 1 VIEW: CPT

## 2021-06-08 PROCEDURE — 90791 PSYCH DIAGNOSTIC EVALUATION: CPT | Performed by: SOCIAL WORKER

## 2021-06-08 PROCEDURE — 302101 FENESTRATED FOAM: Performed by: SURGERY

## 2021-06-08 PROCEDURE — 700111 HCHG RX REV CODE 636 W/ 250 OVERRIDE (IP): Performed by: SURGERY

## 2021-06-08 PROCEDURE — 85025 COMPLETE CBC W/AUTO DIFF WBC: CPT

## 2021-06-08 RX ADMIN — AMOXICILLIN AND CLAVULANATE POTASSIUM 1 TABLET: 875; 125 TABLET, FILM COATED ORAL at 17:40

## 2021-06-08 RX ADMIN — AMOXICILLIN AND CLAVULANATE POTASSIUM 1 TABLET: 875; 125 TABLET, FILM COATED ORAL at 05:51

## 2021-06-08 RX ADMIN — SULFAMETHOXAZOLE AND TRIMETHOPRIM 1 TABLET: 800; 160 TABLET ORAL at 17:39

## 2021-06-08 RX ADMIN — CHLORHEXIDINE GLUCONATE 0.12% ORAL RINSE 15 ML: 1.2 LIQUID ORAL at 05:50

## 2021-06-08 RX ADMIN — AMIODARONE HYDROCHLORIDE 200 MG: 200 TABLET ORAL at 05:51

## 2021-06-08 RX ADMIN — CHLORHEXIDINE GLUCONATE 0.12% ORAL RINSE 15 ML: 1.2 LIQUID ORAL at 17:40

## 2021-06-08 RX ADMIN — ENOXAPARIN SODIUM 40 MG: 40 INJECTION SUBCUTANEOUS at 05:50

## 2021-06-08 RX ADMIN — SULFAMETHOXAZOLE AND TRIMETHOPRIM 1 TABLET: 800; 160 TABLET ORAL at 05:51

## 2021-06-08 RX ADMIN — OXYCODONE HYDROCHLORIDE 10 MG: 10 TABLET ORAL at 20:28

## 2021-06-08 RX ADMIN — DOCUSATE SODIUM 50 MG AND SENNOSIDES 8.6 MG 1 TABLET: 8.6; 5 TABLET, FILM COATED ORAL at 20:28

## 2021-06-08 RX ADMIN — FLUCONAZOLE 200 MG: 200 TABLET ORAL at 05:51

## 2021-06-08 RX ADMIN — ACETAMINOPHEN 650 MG: 325 TABLET, FILM COATED ORAL at 17:40

## 2021-06-08 RX ADMIN — DOCUSATE SODIUM 100 MG: 50 LIQUID ORAL at 05:50

## 2021-06-08 ASSESSMENT — ENCOUNTER SYMPTOMS
RESPIRATORY NEGATIVE: 1
ROS GI COMMENTS: BM 6/7
GASTROINTESTINAL NEGATIVE: 1
EYE PAIN: 1
NECK PAIN: 1
HEADACHES: 0
CONSTITUTIONAL NEGATIVE: 1

## 2021-06-08 ASSESSMENT — PAIN DESCRIPTION - PAIN TYPE
TYPE: ACUTE PAIN

## 2021-06-08 NOTE — CONSULTS
Brief Behavioral Health Note:    Patient seen sitting up in bed, mother Delfina, at bedside. Patient unable to communicate except gestures and writing short notes. At this time, patient continues to remain on a Legal Hold due to the severity of his suicide attempt.  When patient is able to fully engage in the interview and assessment process, safety plan and clearly indicate no longer at risk, the legal hold could be discontinued. Until then, patient will remain on a legal hold.    Recommendations:    1. Telesitter  2. No phone  3. No personal belongings  4. Visitors limited to :Mother Delfina Tovar; Sister Ingrid and her ; [place], nephew, Tanika Mueller- sister; Ex partner Vadim King  5. Please transfer to an inpatient psychiatric hospital when patient is medically cleared.  6. Legal hold-extended    Thank you    Jojo Cooley, Ph.D., [facility]
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
## 2021-06-08 NOTE — CARE PLAN
Problem: Aerosol Therapy  Goal: Improved hydration/ability to mobilize secretions and/or decreased airway edema  Description: 1.  Implement heated or cool aerosol therapy  2.  Assessed for optimal hydration, decreased edema and/or improved ability to mobilize secretions  Outcome: Progressing  Flowsheets (Taken 6/7/2021 1727)  Indications: Presence of tracheostomy or laryngectomy  Outcome: Improved ability to mobilize secretions        Respiratory Update    Treatment modality: Heated T -Piece  5L 30%  Frequency: Q4     Pt tolerating current treatments well with no adverse reactions.

## 2021-06-08 NOTE — PROGRESS NOTES
Received report from previous shift RN  Assessment complete.  A&O x 4. Patient calls appropriately.  Patient ambulates with SBA assist. Bed alarm on.   Patient has 3/10 pain. Pain managed with prescribed medications.  Denies N&V. Strict NPO.   Rodriguez Schwartze running impact peptide @ 60 (goal)   + void, + flatus, last BM 6/7.  Patient denies SOB. Trach in place.     Patient sitting in bed. Review plan with of care with patient. Call light and personal belongings with in reach. Hourly rounding in place. All needs met at this time.

## 2021-06-08 NOTE — PROGRESS NOTES
"/61   Pulse 63   Temp 36.3 °C (97.4 °F) (Temporal)   Resp 16   Ht 1.803 m (5' 11\")   Wt 86.9 kg (191 lb 9.3 oz)   SpO2 95%     I/O last 3 completed shifts:  In: 990 [NG/GT:720]  Out: 600 [Urine:600]    Slow improvement  "

## 2021-06-08 NOTE — CARE PLAN
Problem: Psychosocial  Goal: Patient's ability to identify and develop effective coping behaviors will improve  Outcome: Progressing   Pt's ability to cope seems to have improved. PT gave a thumbs up  Problem: Pain - Standard  Goal: Alleviation of pain or a reduction in pain to the patient’s comfort goal  Outcome: Progressing   Pt c/o minimal pain at this time

## 2021-06-08 NOTE — CARE PLAN
Problem: Pain - Standard  Goal: Alleviation of pain or a reduction in pain to the patient’s comfort goal  Outcome: Progressing     Problem: Communication  Goal: The ability to communicate needs accurately and effectively will improve  Outcome: Progressing     Problem: Respiratory  Goal: Patient will achieve/maintain optimum respiratory ventilation and gas exchange  Outcome: Progressing     Problem: Mobility  Goal: Patient's capacity to carry out activities will improve  Outcome: Progressing     The patient is Watcher - Medium risk of patient condition declining or worsening    Shift Goals  Clinical Goals: safety   Patient Goals: ambulate  Family Goals: Update on plan of care    Progress made toward(s) clinical / shift goals: Hourly rounding in place, tele sitter in place, bed alarm on. Use of call light reinforced when assistance is required.     Patient is not progressing towards the following goals:

## 2021-06-09 ENCOUNTER — ANESTHESIA EVENT (OUTPATIENT)
Dept: SURGERY | Facility: MEDICAL CENTER | Age: 51
DRG: 003 | End: 2021-06-09
Payer: MEDICAID

## 2021-06-09 ENCOUNTER — ANESTHESIA (OUTPATIENT)
Dept: SURGERY | Facility: MEDICAL CENTER | Age: 51
DRG: 003 | End: 2021-06-09
Payer: MEDICAID

## 2021-06-09 ENCOUNTER — APPOINTMENT (OUTPATIENT)
Dept: RADIOLOGY | Facility: MEDICAL CENTER | Age: 51
DRG: 003 | End: 2021-06-09
Attending: SURGERY
Payer: MEDICAID

## 2021-06-09 PROBLEM — R13.10 DYSPHAGIA: Status: ACTIVE | Noted: 2021-06-09

## 2021-06-09 LAB
ANION GAP SERPL CALC-SCNC: 11 MMOL/L (ref 7–16)
BASOPHILS # BLD AUTO: 0.8 % (ref 0–1.8)
BASOPHILS # BLD: 0.11 K/UL (ref 0–0.12)
BUN SERPL-MCNC: 26 MG/DL (ref 8–22)
CALCIUM SERPL-MCNC: 9.5 MG/DL (ref 8.5–10.5)
CHLORIDE SERPL-SCNC: 110 MMOL/L (ref 96–112)
CO2 SERPL-SCNC: 26 MMOL/L (ref 20–33)
CREAT SERPL-MCNC: 1.06 MG/DL (ref 0.5–1.4)
EOSINOPHIL # BLD AUTO: 0 K/UL (ref 0–0.51)
EOSINOPHIL NFR BLD: 0 % (ref 0–6.9)
ERYTHROCYTE [DISTWIDTH] IN BLOOD BY AUTOMATED COUNT: 46.7 FL (ref 35.9–50)
GLUCOSE SERPL-MCNC: 150 MG/DL (ref 65–99)
HCT VFR BLD AUTO: 39.3 % (ref 42–52)
HGB BLD-MCNC: 12.3 G/DL (ref 14–18)
IMM GRANULOCYTES # BLD AUTO: 0.12 K/UL (ref 0–0.11)
IMM GRANULOCYTES NFR BLD AUTO: 0.9 % (ref 0–0.9)
LYMPHOCYTES # BLD AUTO: 1.14 K/UL (ref 1–4.8)
LYMPHOCYTES NFR BLD: 8.2 % (ref 22–41)
MCH RBC QN AUTO: 30.1 PG (ref 27–33)
MCHC RBC AUTO-ENTMCNC: 31.3 G/DL (ref 33.7–35.3)
MCV RBC AUTO: 96.3 FL (ref 81.4–97.8)
MONOCYTES # BLD AUTO: 0.49 K/UL (ref 0–0.85)
MONOCYTES NFR BLD AUTO: 3.5 % (ref 0–13.4)
NEUTROPHILS # BLD AUTO: 12.05 K/UL (ref 1.82–7.42)
NEUTROPHILS NFR BLD: 86.6 % (ref 44–72)
NRBC # BLD AUTO: 0 K/UL
NRBC BLD-RTO: 0 /100 WBC
PLATELET # BLD AUTO: 462 K/UL (ref 164–446)
PMV BLD AUTO: 9.7 FL (ref 9–12.9)
POTASSIUM SERPL-SCNC: 4.8 MMOL/L (ref 3.6–5.5)
RBC # BLD AUTO: 4.08 M/UL (ref 4.7–6.1)
SARS-COV+SARS-COV-2 AG RESP QL IA.RAPID: NOTDETECTED
SARS-COV-2 RNA RESP QL NAA+PROBE: NOTDETECTED
SODIUM SERPL-SCNC: 147 MMOL/L (ref 135–145)
SPECIMEN SOURCE: NORMAL
SPECIMEN SOURCE: NORMAL
WBC # BLD AUTO: 13.9 K/UL (ref 4.8–10.8)

## 2021-06-09 PROCEDURE — A9270 NON-COVERED ITEM OR SERVICE: HCPCS | Performed by: NURSE PRACTITIONER

## 2021-06-09 PROCEDURE — 700111 HCHG RX REV CODE 636 W/ 250 OVERRIDE (IP): Performed by: SURGERY

## 2021-06-09 PROCEDURE — 700102 HCHG RX REV CODE 250 W/ 637 OVERRIDE(OP): Performed by: NURSE PRACTITIONER

## 2021-06-09 PROCEDURE — 502571 HCHG PACK, LAP CHOLE: Performed by: SURGERY

## 2021-06-09 PROCEDURE — 94760 N-INVAS EAR/PLS OXIMETRY 1: CPT

## 2021-06-09 PROCEDURE — 0DH64UZ INSERTION OF FEEDING DEVICE INTO STOMACH, PERCUTANEOUS ENDOSCOPIC APPROACH: ICD-10-PCS | Performed by: SURGERY

## 2021-06-09 PROCEDURE — A9270 NON-COVERED ITEM OR SERVICE: HCPCS | Performed by: SURGERY

## 2021-06-09 PROCEDURE — 700102 HCHG RX REV CODE 250 W/ 637 OVERRIDE(OP): Performed by: SURGERY

## 2021-06-09 PROCEDURE — 160208 HCHG ENDO MINUTES - EA ADDL 1 MIN LEVEL 4: Performed by: SURGERY

## 2021-06-09 PROCEDURE — 87426 SARSCOV CORONAVIRUS AG IA: CPT

## 2021-06-09 PROCEDURE — 94640 AIRWAY INHALATION TREATMENT: CPT

## 2021-06-09 PROCEDURE — 501586 HCHG TROCAR, THRD SPIKE 5X55: Performed by: SURGERY

## 2021-06-09 PROCEDURE — 85025 COMPLETE CBC W/AUTO DIFF WBC: CPT

## 2021-06-09 PROCEDURE — 700101 HCHG RX REV CODE 250: Performed by: ANESTHESIOLOGY

## 2021-06-09 PROCEDURE — 36415 COLL VENOUS BLD VENIPUNCTURE: CPT

## 2021-06-09 PROCEDURE — 700111 HCHG RX REV CODE 636 W/ 250 OVERRIDE (IP): Performed by: ANESTHESIOLOGY

## 2021-06-09 PROCEDURE — 501838 HCHG SUTURE GENERAL: Performed by: SURGERY

## 2021-06-09 PROCEDURE — 770020 HCHG ROOM/CARE - TELE (206)

## 2021-06-09 PROCEDURE — U0003 INFECTIOUS AGENT DETECTION BY NUCLEIC ACID (DNA OR RNA); SEVERE ACUTE RESPIRATORY SYNDROME CORONAVIRUS 2 (SARS-COV-2) (CORONAVIRUS DISEASE [COVID-19]), AMPLIFIED PROBE TECHNIQUE, MAKING USE OF HIGH THROUGHPUT TECHNOLOGIES AS DESCRIBED BY CMS-2020-01-R: HCPCS

## 2021-06-09 PROCEDURE — 700105 HCHG RX REV CODE 258: Performed by: ANESTHESIOLOGY

## 2021-06-09 PROCEDURE — 501623 HCHG TUBE, GASTROSTOMY: Performed by: SURGERY

## 2021-06-09 PROCEDURE — 80048 BASIC METABOLIC PNL TOTAL CA: CPT

## 2021-06-09 PROCEDURE — 500868 HCHG NEEDLE, SURGI(VARES): Performed by: SURGERY

## 2021-06-09 PROCEDURE — U0005 INFEC AGEN DETEC AMPLI PROBE: HCPCS

## 2021-06-09 PROCEDURE — 71045 X-RAY EXAM CHEST 1 VIEW: CPT

## 2021-06-09 PROCEDURE — 160002 HCHG RECOVERY MINUTES (STAT): Performed by: SURGERY

## 2021-06-09 PROCEDURE — 160048 HCHG OR STATISTICAL LEVEL 1-5: Performed by: SURGERY

## 2021-06-09 PROCEDURE — 160009 HCHG ANES TIME/MIN: Performed by: SURGERY

## 2021-06-09 PROCEDURE — 160203 HCHG ENDO MINUTES - 1ST 30 MINS LEVEL 4: Performed by: SURGERY

## 2021-06-09 PROCEDURE — 160035 HCHG PACU - 1ST 60 MINS PHASE I: Performed by: SURGERY

## 2021-06-09 RX ORDER — HYDROMORPHONE HYDROCHLORIDE 1 MG/ML
0.4 INJECTION, SOLUTION INTRAMUSCULAR; INTRAVENOUS; SUBCUTANEOUS
Status: DISCONTINUED | OUTPATIENT
Start: 2021-06-09 | End: 2021-06-09 | Stop reason: HOSPADM

## 2021-06-09 RX ORDER — HYDROMORPHONE HYDROCHLORIDE 1 MG/ML
0.2 INJECTION, SOLUTION INTRAMUSCULAR; INTRAVENOUS; SUBCUTANEOUS
Status: DISCONTINUED | OUTPATIENT
Start: 2021-06-09 | End: 2021-06-09 | Stop reason: HOSPADM

## 2021-06-09 RX ORDER — OXYCODONE HCL 5 MG/5 ML
5 SOLUTION, ORAL ORAL
Status: DISCONTINUED | OUTPATIENT
Start: 2021-06-09 | End: 2021-06-09 | Stop reason: HOSPADM

## 2021-06-09 RX ORDER — KETAMINE HYDROCHLORIDE 50 MG/ML
INJECTION, SOLUTION INTRAMUSCULAR; INTRAVENOUS PRN
Status: DISCONTINUED | OUTPATIENT
Start: 2021-06-09 | End: 2021-06-09 | Stop reason: SURG

## 2021-06-09 RX ORDER — ROCURONIUM BROMIDE 10 MG/ML
INJECTION, SOLUTION INTRAVENOUS PRN
Status: DISCONTINUED | OUTPATIENT
Start: 2021-06-09 | End: 2021-06-09 | Stop reason: SURG

## 2021-06-09 RX ORDER — SODIUM CHLORIDE, SODIUM LACTATE, POTASSIUM CHLORIDE, CALCIUM CHLORIDE 600; 310; 30; 20 MG/100ML; MG/100ML; MG/100ML; MG/100ML
INJECTION, SOLUTION INTRAVENOUS CONTINUOUS
Status: DISCONTINUED | OUTPATIENT
Start: 2021-06-09 | End: 2021-06-09 | Stop reason: HOSPADM

## 2021-06-09 RX ORDER — LABETALOL HYDROCHLORIDE 5 MG/ML
5 INJECTION, SOLUTION INTRAVENOUS
Status: DISCONTINUED | OUTPATIENT
Start: 2021-06-09 | End: 2021-06-09 | Stop reason: HOSPADM

## 2021-06-09 RX ORDER — LIDOCAINE HYDROCHLORIDE 20 MG/ML
INJECTION, SOLUTION EPIDURAL; INFILTRATION; INTRACAUDAL; PERINEURAL PRN
Status: DISCONTINUED | OUTPATIENT
Start: 2021-06-09 | End: 2021-06-09 | Stop reason: SURG

## 2021-06-09 RX ORDER — CEFAZOLIN SODIUM 1 G/3ML
INJECTION, POWDER, FOR SOLUTION INTRAMUSCULAR; INTRAVENOUS PRN
Status: DISCONTINUED | OUTPATIENT
Start: 2021-06-09 | End: 2021-06-09 | Stop reason: SURG

## 2021-06-09 RX ORDER — BUPIVACAINE HYDROCHLORIDE 2.5 MG/ML
INJECTION, SOLUTION EPIDURAL; INFILTRATION; INTRACAUDAL
Status: DISCONTINUED | OUTPATIENT
Start: 2021-06-09 | End: 2021-06-09 | Stop reason: HOSPADM

## 2021-06-09 RX ORDER — OXYCODONE HCL 5 MG/5 ML
10 SOLUTION, ORAL ORAL
Status: DISCONTINUED | OUTPATIENT
Start: 2021-06-09 | End: 2021-06-09 | Stop reason: HOSPADM

## 2021-06-09 RX ORDER — DEXAMETHASONE SODIUM PHOSPHATE 4 MG/ML
INJECTION, SOLUTION INTRA-ARTICULAR; INTRALESIONAL; INTRAMUSCULAR; INTRAVENOUS; SOFT TISSUE PRN
Status: DISCONTINUED | OUTPATIENT
Start: 2021-06-09 | End: 2021-06-09 | Stop reason: SURG

## 2021-06-09 RX ORDER — ONDANSETRON 2 MG/ML
INJECTION INTRAMUSCULAR; INTRAVENOUS PRN
Status: DISCONTINUED | OUTPATIENT
Start: 2021-06-09 | End: 2021-06-09 | Stop reason: SURG

## 2021-06-09 RX ORDER — HYDRALAZINE HYDROCHLORIDE 20 MG/ML
5 INJECTION INTRAMUSCULAR; INTRAVENOUS
Status: DISCONTINUED | OUTPATIENT
Start: 2021-06-09 | End: 2021-06-09 | Stop reason: HOSPADM

## 2021-06-09 RX ORDER — SODIUM CHLORIDE, SODIUM LACTATE, POTASSIUM CHLORIDE, CALCIUM CHLORIDE 600; 310; 30; 20 MG/100ML; MG/100ML; MG/100ML; MG/100ML
INJECTION, SOLUTION INTRAVENOUS
Status: DISCONTINUED | OUTPATIENT
Start: 2021-06-09 | End: 2021-06-09 | Stop reason: SURG

## 2021-06-09 RX ORDER — HYDROMORPHONE HYDROCHLORIDE 1 MG/ML
0.5 INJECTION, SOLUTION INTRAMUSCULAR; INTRAVENOUS; SUBCUTANEOUS
Status: DISCONTINUED | OUTPATIENT
Start: 2021-06-09 | End: 2021-06-09 | Stop reason: HOSPADM

## 2021-06-09 RX ORDER — MIDAZOLAM HYDROCHLORIDE 1 MG/ML
INJECTION INTRAMUSCULAR; INTRAVENOUS PRN
Status: DISCONTINUED | OUTPATIENT
Start: 2021-06-09 | End: 2021-06-09 | Stop reason: SURG

## 2021-06-09 RX ORDER — MEPERIDINE HYDROCHLORIDE 25 MG/ML
12.5 INJECTION INTRAMUSCULAR; INTRAVENOUS; SUBCUTANEOUS
Status: DISCONTINUED | OUTPATIENT
Start: 2021-06-09 | End: 2021-06-09 | Stop reason: HOSPADM

## 2021-06-09 RX ORDER — HALOPERIDOL 5 MG/ML
1 INJECTION INTRAMUSCULAR
Status: DISCONTINUED | OUTPATIENT
Start: 2021-06-09 | End: 2021-06-09 | Stop reason: HOSPADM

## 2021-06-09 RX ORDER — DIPHENHYDRAMINE HYDROCHLORIDE 50 MG/ML
12.5 INJECTION INTRAMUSCULAR; INTRAVENOUS
Status: DISCONTINUED | OUTPATIENT
Start: 2021-06-09 | End: 2021-06-09 | Stop reason: HOSPADM

## 2021-06-09 RX ADMIN — AMIODARONE HYDROCHLORIDE 200 MG: 200 TABLET ORAL at 05:52

## 2021-06-09 RX ADMIN — PROPOFOL 150 MG: 10 INJECTION, EMULSION INTRAVENOUS at 09:08

## 2021-06-09 RX ADMIN — FENTANYL CITRATE 50 MCG: 50 INJECTION, SOLUTION INTRAMUSCULAR; INTRAVENOUS at 09:08

## 2021-06-09 RX ADMIN — KETAMINE HYDROCHLORIDE 50 MG: 50 INJECTION INTRAMUSCULAR; INTRAVENOUS at 09:20

## 2021-06-09 RX ADMIN — CHLORHEXIDINE GLUCONATE 0.12% ORAL RINSE 15 ML: 1.2 LIQUID ORAL at 17:39

## 2021-06-09 RX ADMIN — CHLORHEXIDINE GLUCONATE 0.12% ORAL RINSE 15 ML: 1.2 LIQUID ORAL at 05:52

## 2021-06-09 RX ADMIN — LIDOCAINE HYDROCHLORIDE 80 MG: 20 INJECTION, SOLUTION EPIDURAL; INFILTRATION; INTRACAUDAL at 09:08

## 2021-06-09 RX ADMIN — DOCUSATE SODIUM 100 MG: 50 LIQUID ORAL at 17:39

## 2021-06-09 RX ADMIN — OXYCODONE HYDROCHLORIDE 10 MG: 10 TABLET ORAL at 15:39

## 2021-06-09 RX ADMIN — SULFAMETHOXAZOLE AND TRIMETHOPRIM 1 TABLET: 800; 160 TABLET ORAL at 05:53

## 2021-06-09 RX ADMIN — SODIUM CHLORIDE, POTASSIUM CHLORIDE, SODIUM LACTATE AND CALCIUM CHLORIDE: 600; 310; 30; 20 INJECTION, SOLUTION INTRAVENOUS at 08:57

## 2021-06-09 RX ADMIN — OXYCODONE HYDROCHLORIDE 10 MG: 10 TABLET ORAL at 05:52

## 2021-06-09 RX ADMIN — CEFAZOLIN 2 G: 330 INJECTION, POWDER, FOR SOLUTION INTRAMUSCULAR; INTRAVENOUS at 09:11

## 2021-06-09 RX ADMIN — DOCUSATE SODIUM 50 MG AND SENNOSIDES 8.6 MG 1 TABLET: 8.6; 5 TABLET, FILM COATED ORAL at 20:39

## 2021-06-09 RX ADMIN — MIDAZOLAM HYDROCHLORIDE 2 MG: 1 INJECTION, SOLUTION INTRAMUSCULAR; INTRAVENOUS at 09:02

## 2021-06-09 RX ADMIN — FENTANYL CITRATE 50 MCG: 50 INJECTION, SOLUTION INTRAMUSCULAR; INTRAVENOUS at 09:21

## 2021-06-09 RX ADMIN — ENOXAPARIN SODIUM 40 MG: 40 INJECTION SUBCUTANEOUS at 05:52

## 2021-06-09 RX ADMIN — AMOXICILLIN AND CLAVULANATE POTASSIUM 1 TABLET: 875; 125 TABLET, FILM COATED ORAL at 17:39

## 2021-06-09 RX ADMIN — ONDANSETRON 4 MG: 2 INJECTION INTRAMUSCULAR; INTRAVENOUS at 09:23

## 2021-06-09 RX ADMIN — SULFAMETHOXAZOLE AND TRIMETHOPRIM 1 TABLET: 800; 160 TABLET ORAL at 17:40

## 2021-06-09 RX ADMIN — FLUCONAZOLE 200 MG: 200 TABLET ORAL at 05:52

## 2021-06-09 RX ADMIN — DEXAMETHASONE SODIUM PHOSPHATE 8 MG: 4 INJECTION, SOLUTION INTRA-ARTICULAR; INTRALESIONAL; INTRAMUSCULAR; INTRAVENOUS; SOFT TISSUE at 09:11

## 2021-06-09 RX ADMIN — ROCURONIUM BROMIDE 50 MG: 10 INJECTION, SOLUTION INTRAVENOUS at 09:08

## 2021-06-09 RX ADMIN — AMOXICILLIN AND CLAVULANATE POTASSIUM 1 TABLET: 875; 125 TABLET, FILM COATED ORAL at 05:53

## 2021-06-09 ASSESSMENT — PAIN DESCRIPTION - PAIN TYPE
TYPE: ACUTE PAIN

## 2021-06-09 ASSESSMENT — ENCOUNTER SYMPTOMS
CONSTITUTIONAL NEGATIVE: 1
HEADACHES: 0
RESPIRATORY NEGATIVE: 1
EYE PAIN: 1
GASTROINTESTINAL NEGATIVE: 1
NECK PAIN: 1
ROS GI COMMENTS: BM 6/7
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
## 2021-06-09 NOTE — CARE PLAN
Problem: Pain - Standard  Goal: Alleviation of pain or a reduction in pain to the patient’s comfort goal  Outcome: Progressing     Problem: Respiratory  Goal: Patient will achieve/maintain optimum respiratory ventilation and gas exchange  Outcome: Progressing     Problem: Mobility  Goal: Patient's capacity to carry out activities will improve  Outcome: Progressing     The patient is Watcher - Medium risk of patient condition declining or worsening    Shift Goals  Clinical Goals: safety   Patient Goals: ambulate   Family Goals: Update on plan of care    Progress made toward(s) clinical / shift goals: bed alarm on, tele sitter in place, hourly rounding in place.   Pt ambulating halls with staff and up to bathroom.     Patient is not progressing towards the following goals:

## 2021-06-09 NOTE — CARE PLAN
The patient is Watcher - Medium risk of patient condition declining or worsening    Shift Goals  Clinical Goals: safety   Patient Goals: ambulate   Family Goals: Update on plan of care    Progress made toward(s) clinical / shift goals:      Patient is not progressing towards the following goals:

## 2021-06-09 NOTE — OR NURSING
0938  Pt arrived from OR, handoff received from anesthesiologist and RN. S/P G tube placement, Open to air, C/D/I. Pt sleeping comfortably.    1000  Pt denies pain    1020  Unable to reach pts family by phone.    1030  Handoff given to Ivette GONZALEZ RN    1040  Pt transported by RN. O2 tank level above half full, pt transported on 4 L 02 trach mask. Tubing connected to device and to patient and stored safely in herr prior to transport.

## 2021-06-09 NOTE — PROGRESS NOTES
Received report from previous shift RN  Assessment complete.  A&O x 4. Patient calls appropriately.  Patient ambulates with SBA assist. Bed alarm on. Tele sitter in place.   Patient has 8/10 pain. Pain managed with prescribed medications.  Denies N&V. Strict NPO.   Gunnerraaltagracia Schwartze running impact peptide @ 60 (goal)   + void, + flatus, last BM 6/7.  Patient denies SOB. Trach in place.     Patient sitting in bed. Review plan with of care with patient. Call light and personal belongings with in reach. Hourly rounding in place. All needs met at this time.
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
## 2021-06-09 NOTE — PROGRESS NOTES
Legal hold and tele sitter in place. PT calls for assistance before moving OOB. Pt has had fewer secretions throughout the day. This evening pt coughed up a large amount of thin sticky brown secretions through his mouth, suctioned with yankauer. RT called to round on pt. Trach care performed, cortreck remains in place with impact peptide at goal of 60.30cc free water flush q4. Pt ambulated in hallway today.

## 2021-06-09 NOTE — ANESTHESIA TIME REPORT
Anesthesia Start and Stop Event Times     Date Time Event    6/9/2021 0857 Ready for Procedure     0857 Anesthesia Start     0944 Anesthesia Stop        Responsible Staff  06/09/21    Name Role Begin End    Gamal Kincaid M.D. Anesth 0857 0944        Preop Diagnosis (Free Text):  Pre-op Diagnosis     dysphagia        Preop Diagnosis (Codes):    Post op Diagnosis  Dysphagia      Premium Reason  Non-Premium    Comments:

## 2021-06-09 NOTE — ANESTHESIA POSTPROCEDURE EVALUATION
Patient: Greg Tovar    Procedure Summary     Date: 06/09/21 Room / Location: [facility] 08 / SURGERY [facility]    Anesthesia Start: 0857 Anesthesia Stop: 0944    Procedure: CREATION, GASTROSTOMY, LAPAROSCOPIC (N/A Abdomen) Diagnosis: (dysphagia)    Surgeons: Annia Cabrera M.D. Responsible Provider: Gamal Kincaid M.D.    Anesthesia Type: general ASA Status: 4          Final Anesthesia Type: general  Last vitals  BP   Blood Pressure: 116/73    Temp   36.4 °C (97.6 °F)    Pulse   81   Resp   18    SpO2   93 %      Anesthesia Post Evaluation    Patient location during evaluation: PACU  Patient participation: complete - patient participated  Level of consciousness: sleepy but conscious    Airway patency: patent  Anesthetic complications: no  Cardiovascular status: hemodynamically stable  Respiratory status: acceptable  Hydration status: balanced    PONV: none          No complications documented.     Nurse Pain Score: 10 (NPRS)

## 2021-06-10 ENCOUNTER — APPOINTMENT (OUTPATIENT)
Dept: RADIOLOGY | Facility: MEDICAL CENTER | Age: 51
DRG: 003 | End: 2021-06-10
Attending: SURGERY
Payer: MEDICAID

## 2021-06-10 LAB
ANION GAP SERPL CALC-SCNC: 10 MMOL/L (ref 7–16)
BASOPHILS # BLD AUTO: 0.5 % (ref 0–1.8)
BASOPHILS # BLD: 0.07 K/UL (ref 0–0.12)
BUN SERPL-MCNC: 28 MG/DL (ref 8–22)
CALCIUM SERPL-MCNC: 9.5 MG/DL (ref 8.5–10.5)
CHLORIDE SERPL-SCNC: 106 MMOL/L (ref 96–112)
CO2 SERPL-SCNC: 28 MMOL/L (ref 20–33)
CREAT SERPL-MCNC: 0.96 MG/DL (ref 0.5–1.4)
EOSINOPHIL # BLD AUTO: 0 K/UL (ref 0–0.51)
EOSINOPHIL NFR BLD: 0 % (ref 0–6.9)
ERYTHROCYTE [DISTWIDTH] IN BLOOD BY AUTOMATED COUNT: 46.1 FL (ref 35.9–50)
GLUCOSE SERPL-MCNC: 102 MG/DL (ref 65–99)
HCT VFR BLD AUTO: 40.1 % (ref 42–52)
HGB BLD-MCNC: 12.7 G/DL (ref 14–18)
IMM GRANULOCYTES # BLD AUTO: 0.12 K/UL (ref 0–0.11)
IMM GRANULOCYTES NFR BLD AUTO: 0.8 % (ref 0–0.9)
LYMPHOCYTES # BLD AUTO: 2.42 K/UL (ref 1–4.8)
LYMPHOCYTES NFR BLD: 16.3 % (ref 22–41)
MAGNESIUM SERPL-MCNC: 2.6 MG/DL (ref 1.5–2.5)
MCH RBC QN AUTO: 30.1 PG (ref 27–33)
MCHC RBC AUTO-ENTMCNC: 31.7 G/DL (ref 33.7–35.3)
MCV RBC AUTO: 95 FL (ref 81.4–97.8)
MONOCYTES # BLD AUTO: 1.61 K/UL (ref 0–0.85)
MONOCYTES NFR BLD AUTO: 10.8 % (ref 0–13.4)
NEUTROPHILS # BLD AUTO: 10.63 K/UL (ref 1.82–7.42)
NEUTROPHILS NFR BLD: 71.6 % (ref 44–72)
NRBC # BLD AUTO: 0 K/UL
NRBC BLD-RTO: 0 /100 WBC
PHOSPHATE SERPL-MCNC: 3.3 MG/DL (ref 2.5–4.5)
PLATELET # BLD AUTO: 550 K/UL (ref 164–446)
PMV BLD AUTO: 10.5 FL (ref 9–12.9)
POTASSIUM SERPL-SCNC: 4.3 MMOL/L (ref 3.6–5.5)
RBC # BLD AUTO: 4.22 M/UL (ref 4.7–6.1)
SODIUM SERPL-SCNC: 144 MMOL/L (ref 135–145)
WBC # BLD AUTO: 14.9 K/UL (ref 4.8–10.8)

## 2021-06-10 PROCEDURE — 700111 HCHG RX REV CODE 636 W/ 250 OVERRIDE (IP): Performed by: SURGERY

## 2021-06-10 PROCEDURE — 31502 CHANGE OF WINDPIPE AIRWAY: CPT

## 2021-06-10 PROCEDURE — 700102 HCHG RX REV CODE 250 W/ 637 OVERRIDE(OP): Performed by: SURGERY

## 2021-06-10 PROCEDURE — 700102 HCHG RX REV CODE 250 W/ 637 OVERRIDE(OP): Performed by: NURSE PRACTITIONER

## 2021-06-10 PROCEDURE — 85025 COMPLETE CBC W/AUTO DIFF WBC: CPT

## 2021-06-10 PROCEDURE — 92507 TX SP LANG VOICE COMM INDIV: CPT

## 2021-06-10 PROCEDURE — 84100 ASSAY OF PHOSPHORUS: CPT

## 2021-06-10 PROCEDURE — 94640 AIRWAY INHALATION TREATMENT: CPT

## 2021-06-10 PROCEDURE — 94760 N-INVAS EAR/PLS OXIMETRY 1: CPT

## 2021-06-10 PROCEDURE — A9270 NON-COVERED ITEM OR SERVICE: HCPCS | Performed by: NURSE PRACTITIONER

## 2021-06-10 PROCEDURE — A9270 NON-COVERED ITEM OR SERVICE: HCPCS | Performed by: SURGERY

## 2021-06-10 PROCEDURE — 80048 BASIC METABOLIC PNL TOTAL CA: CPT

## 2021-06-10 PROCEDURE — 36415 COLL VENOUS BLD VENIPUNCTURE: CPT

## 2021-06-10 PROCEDURE — 83735 ASSAY OF MAGNESIUM: CPT

## 2021-06-10 PROCEDURE — 71045 X-RAY EXAM CHEST 1 VIEW: CPT

## 2021-06-10 PROCEDURE — 770020 HCHG ROOM/CARE - TELE (206)

## 2021-06-10 RX ADMIN — DOCUSATE SODIUM 100 MG: 50 LIQUID ORAL at 17:17

## 2021-06-10 RX ADMIN — OXYCODONE HYDROCHLORIDE 10 MG: 10 TABLET ORAL at 08:33

## 2021-06-10 RX ADMIN — SULFAMETHOXAZOLE AND TRIMETHOPRIM 1 TABLET: 800; 160 TABLET ORAL at 05:36

## 2021-06-10 RX ADMIN — AMOXICILLIN AND CLAVULANATE POTASSIUM 1 TABLET: 875; 125 TABLET, FILM COATED ORAL at 05:37

## 2021-06-10 RX ADMIN — OXYCODONE 5 MG: 5 TABLET ORAL at 13:29

## 2021-06-10 RX ADMIN — AMOXICILLIN AND CLAVULANATE POTASSIUM 1 TABLET: 875; 125 TABLET, FILM COATED ORAL at 17:17

## 2021-06-10 RX ADMIN — SULFAMETHOXAZOLE AND TRIMETHOPRIM 1 TABLET: 800; 160 TABLET ORAL at 17:17

## 2021-06-10 RX ADMIN — CHLORHEXIDINE GLUCONATE 0.12% ORAL RINSE 15 ML: 1.2 LIQUID ORAL at 17:17

## 2021-06-10 RX ADMIN — DOCUSATE SODIUM 100 MG: 50 LIQUID ORAL at 05:37

## 2021-06-10 RX ADMIN — POLYETHYLENE GLYCOL 3350 1 PACKET: 17 POWDER, FOR SOLUTION ORAL at 17:17

## 2021-06-10 RX ADMIN — OXYCODONE HYDROCHLORIDE 10 MG: 10 TABLET ORAL at 20:17

## 2021-06-10 RX ADMIN — OXYCODONE 5 MG: 5 TABLET ORAL at 17:17

## 2021-06-10 RX ADMIN — OXYCODONE HYDROCHLORIDE 10 MG: 10 TABLET ORAL at 03:31

## 2021-06-10 RX ADMIN — ENOXAPARIN SODIUM 40 MG: 40 INJECTION SUBCUTANEOUS at 05:36

## 2021-06-10 RX ADMIN — AMIODARONE HYDROCHLORIDE 200 MG: 200 TABLET ORAL at 05:36

## 2021-06-10 RX ADMIN — CHLORHEXIDINE GLUCONATE 0.12% ORAL RINSE 15 ML: 1.2 LIQUID ORAL at 05:36

## 2021-06-10 RX ADMIN — FLUCONAZOLE 200 MG: 200 TABLET ORAL at 05:37

## 2021-06-10 ASSESSMENT — COGNITIVE AND FUNCTIONAL STATUS - GENERAL
DAILY ACTIVITIY SCORE: 23
CLIMB 3 TO 5 STEPS WITH RAILING: A LITTLE
DRESSING REGULAR UPPER BODY CLOTHING: A LITTLE
MOBILITY SCORE: 23
SUGGESTED CMS G CODE MODIFIER MOBILITY: CI
SUGGESTED CMS G CODE MODIFIER DAILY ACTIVITY: CI

## 2021-06-10 ASSESSMENT — ENCOUNTER SYMPTOMS
ROS GI COMMENTS: BM 6/7
GASTROINTESTINAL NEGATIVE: 1
CONSTITUTIONAL NEGATIVE: 1
NECK PAIN: 1
EYE PAIN: 1
RESPIRATORY NEGATIVE: 1
HEADACHES: 0

## 2021-06-10 ASSESSMENT — PAIN DESCRIPTION - PAIN TYPE
TYPE: ACUTE PAIN
TYPE: ACUTE PAIN;SURGICAL PAIN

## 2021-06-10 NOTE — CARE PLAN
Problem: Pain - Standard  Goal: Alleviation of pain or a reduction in pain to the patient’s comfort goal  Outcome: Progressing     Problem: Communication  Goal: The ability to communicate needs accurately and effectively will improve  Outcome: Progressing     Problem: Mobility  Goal: Patient's capacity to carry out activities will improve  Outcome: Progressing     The patient is Stable - Low risk of patient condition declining or worsening    Shift Goals  Clinical Goals: safety, ambulate   Patient Goals: ambulate  Family Goals: Update on plan of care    Progress made toward(s) clinical / shift goals: Bed alarm on. Tele sitter and hourly, rounding in place. Pt ambulated halls and up to bathroom with staff.     Patient is not progressing towards the following goals:
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
## 2021-06-10 NOTE — CARE PLAN
The patient is Watcher - Medium risk of patient condition declining or worsening    Shift Goals  Clinical Goals: safety, ambulate   Patient Goals: ambulate  Family Goals: Update on plan of care    Progress made toward(s) clinical / shift goals:  adequate pain control, patient expressing feelings, patient reports coping mechanisms improving mood    Patient is not progressing towards the following goals:

## 2021-06-10 NOTE — PROGRESS NOTES
Received report from previous shift RN  Assessment complete.  A&O x 4. Patient calls appropriately.  Patient ambulates with SBA assist. Bed alarm on. Tele sitter on place.   Patient has 4/10 pain. Pain managed with prescribed medications.  Denies N&V. Strict NPO.   LUQ G-tube running impact peptide @ 60 (goal)   + void, + flatus, last BM 6/7.  Patient denies SOB. Trach in place.     Patient sitting in bed. Review plan with of care with patient. Call light and personal belongings with in reach. Hourly rounding in place. All needs met at this time.
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
## 2021-06-11 ENCOUNTER — APPOINTMENT (OUTPATIENT)
Dept: RADIOLOGY | Facility: MEDICAL CENTER | Age: 51
DRG: 003 | End: 2021-06-11
Attending: SURGERY
Payer: MEDICAID

## 2021-06-11 LAB
ANION GAP SERPL CALC-SCNC: 11 MMOL/L (ref 7–16)
BASOPHILS # BLD AUTO: 0.7 % (ref 0–1.8)
BASOPHILS # BLD: 0.09 K/UL (ref 0–0.12)
BUN SERPL-MCNC: 31 MG/DL (ref 8–22)
CALCIUM SERPL-MCNC: 9.3 MG/DL (ref 8.5–10.5)
CHLORIDE SERPL-SCNC: 106 MMOL/L (ref 96–112)
CO2 SERPL-SCNC: 29 MMOL/L (ref 20–33)
CREAT SERPL-MCNC: 1.01 MG/DL (ref 0.5–1.4)
EOSINOPHIL # BLD AUTO: 0.32 K/UL (ref 0–0.51)
EOSINOPHIL NFR BLD: 2.6 % (ref 0–6.9)
ERYTHROCYTE [DISTWIDTH] IN BLOOD BY AUTOMATED COUNT: 48 FL (ref 35.9–50)
GLUCOSE SERPL-MCNC: 109 MG/DL (ref 65–99)
HCT VFR BLD AUTO: 42.4 % (ref 42–52)
HGB BLD-MCNC: 12.9 G/DL (ref 14–18)
IMM GRANULOCYTES # BLD AUTO: 0.09 K/UL (ref 0–0.11)
IMM GRANULOCYTES NFR BLD AUTO: 0.7 % (ref 0–0.9)
LYMPHOCYTES # BLD AUTO: 3.19 K/UL (ref 1–4.8)
LYMPHOCYTES NFR BLD: 26.2 % (ref 22–41)
MCH RBC QN AUTO: 29.7 PG (ref 27–33)
MCHC RBC AUTO-ENTMCNC: 30.4 G/DL (ref 33.7–35.3)
MCV RBC AUTO: 97.5 FL (ref 81.4–97.8)
MONOCYTES # BLD AUTO: 1.27 K/UL (ref 0–0.85)
MONOCYTES NFR BLD AUTO: 10.4 % (ref 0–13.4)
NEUTROPHILS # BLD AUTO: 7.21 K/UL (ref 1.82–7.42)
NEUTROPHILS NFR BLD: 59.4 % (ref 44–72)
NRBC # BLD AUTO: 0 K/UL
NRBC BLD-RTO: 0 /100 WBC
PLATELET # BLD AUTO: 510 K/UL (ref 164–446)
PMV BLD AUTO: 9.7 FL (ref 9–12.9)
POTASSIUM SERPL-SCNC: 4.3 MMOL/L (ref 3.6–5.5)
RBC # BLD AUTO: 4.35 M/UL (ref 4.7–6.1)
SODIUM SERPL-SCNC: 146 MMOL/L (ref 135–145)
WBC # BLD AUTO: 12.2 K/UL (ref 4.8–10.8)

## 2021-06-11 PROCEDURE — 700102 HCHG RX REV CODE 250 W/ 637 OVERRIDE(OP): Performed by: SURGERY

## 2021-06-11 PROCEDURE — 71045 X-RAY EXAM CHEST 1 VIEW: CPT

## 2021-06-11 PROCEDURE — 36415 COLL VENOUS BLD VENIPUNCTURE: CPT

## 2021-06-11 PROCEDURE — 90834 PSYTX W PT 45 MINUTES: CPT | Performed by: PSYCHOLOGIST

## 2021-06-11 PROCEDURE — A9270 NON-COVERED ITEM OR SERVICE: HCPCS | Performed by: SURGERY

## 2021-06-11 PROCEDURE — A9270 NON-COVERED ITEM OR SERVICE: HCPCS | Performed by: NURSE PRACTITIONER

## 2021-06-11 PROCEDURE — 94760 N-INVAS EAR/PLS OXIMETRY 1: CPT

## 2021-06-11 PROCEDURE — 770020 HCHG ROOM/CARE - TELE (206)

## 2021-06-11 PROCEDURE — 85025 COMPLETE CBC W/AUTO DIFF WBC: CPT

## 2021-06-11 PROCEDURE — 700102 HCHG RX REV CODE 250 W/ 637 OVERRIDE(OP): Performed by: NURSE PRACTITIONER

## 2021-06-11 PROCEDURE — 80048 BASIC METABOLIC PNL TOTAL CA: CPT

## 2021-06-11 PROCEDURE — 700111 HCHG RX REV CODE 636 W/ 250 OVERRIDE (IP): Performed by: SURGERY

## 2021-06-11 PROCEDURE — 94640 AIRWAY INHALATION TREATMENT: CPT

## 2021-06-11 RX ADMIN — SULFAMETHOXAZOLE AND TRIMETHOPRIM 1 TABLET: 800; 160 TABLET ORAL at 17:20

## 2021-06-11 RX ADMIN — DOCUSATE SODIUM 100 MG: 50 LIQUID ORAL at 04:48

## 2021-06-11 RX ADMIN — AMOXICILLIN AND CLAVULANATE POTASSIUM 1 TABLET: 875; 125 TABLET, FILM COATED ORAL at 17:19

## 2021-06-11 RX ADMIN — ENOXAPARIN SODIUM 40 MG: 40 INJECTION SUBCUTANEOUS at 04:50

## 2021-06-11 RX ADMIN — SULFAMETHOXAZOLE AND TRIMETHOPRIM 1 TABLET: 800; 160 TABLET ORAL at 04:49

## 2021-06-11 RX ADMIN — CHLORHEXIDINE GLUCONATE 0.12% ORAL RINSE 15 ML: 1.2 LIQUID ORAL at 17:19

## 2021-06-11 RX ADMIN — POLYETHYLENE GLYCOL 3350 1 PACKET: 17 POWDER, FOR SOLUTION ORAL at 04:50

## 2021-06-11 RX ADMIN — OXYCODONE HYDROCHLORIDE 10 MG: 10 TABLET ORAL at 21:15

## 2021-06-11 RX ADMIN — AMOXICILLIN AND CLAVULANATE POTASSIUM 1 TABLET: 875; 125 TABLET, FILM COATED ORAL at 04:49

## 2021-06-11 RX ADMIN — AMIODARONE HYDROCHLORIDE 200 MG: 200 TABLET ORAL at 04:49

## 2021-06-11 RX ADMIN — CHLORHEXIDINE GLUCONATE 0.12% ORAL RINSE 15 ML: 1.2 LIQUID ORAL at 04:49

## 2021-06-11 RX ADMIN — MAGNESIUM HYDROXIDE 30 ML: 400 SUSPENSION ORAL at 04:49

## 2021-06-11 RX ADMIN — FLUCONAZOLE 200 MG: 200 TABLET ORAL at 04:48

## 2021-06-11 RX ADMIN — OXYCODONE HYDROCHLORIDE 10 MG: 10 TABLET ORAL at 04:49

## 2021-06-11 ASSESSMENT — PAIN DESCRIPTION - PAIN TYPE
TYPE: ACUTE PAIN

## 2021-06-11 ASSESSMENT — ENCOUNTER SYMPTOMS
CONSTITUTIONAL NEGATIVE: 1
ROS GI COMMENTS: BM TODAY
GASTROINTESTINAL NEGATIVE: 1
NECK PAIN: 0
RESPIRATORY NEGATIVE: 1
HEADACHES: 0
EYE PAIN: 0

## 2021-06-11 NOTE — PROGRESS NOTES
"/67   Pulse 74   Temp 36.3 °C (97.4 °F) (Temporal)   Resp 16   Ht 1.803 m (5' 11\")   Wt 86.9 kg (191 lb 9.3 oz)   SpO2 94%     I/O last 3 completed shifts:  In: 450 [NG/GT:240]  Out: 0     Progressing  OK for Tfer  There are no wires or hardware to be removed  "

## 2021-06-11 NOTE — DISCHARGE PLANNING
Received Order in Response to Request for Court Ordered Involuntary Admission from the court continuing pt until 6/17 at 0900. Sent copy to LIYAH Ferguson, scanned copy into pt's chart.
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
## 2021-06-11 NOTE — PROGRESS NOTES
"Assumed care of patient from [facility] shift RN.  Patient is alert and oriented times 4, states pain of 5/10, declines intervention at this time.  On legal hold, telesitter in use.  Patient in room, able to have visitors and belongings, no phone calls at this time.  VSS /63   Pulse 62   Temp 36.5 °C (97.7 °F) (Temporal)   Resp 18   Ht 1.803 m (5' 11\")   Wt 86.9 kg (191 lb 9.3 oz)   SpO2 96%   BMI 26.72 kg/m²   PIV in the RFA, patent and saline locked.  PIV In the LFA, patent and saline locked.  Trache in place, speaking valve in use per RT/SLP.  On 4L trache mask with saturations in the mid 90s.   in use.  Tele monitored, no events reported overnight.  Last BM 6/7 per patient, bowel protocol in use.  Urinating without difficulty.  Strict NPO.  PEG tube to the abdomen, CDI, SHAISTA.  Running Impact Peptide 1.5 at 60mL/hr, which is goal.  Tolerating well.  Denies nausea or vomiting.  Jaw was reported to be wired shut, per MD, no wires or hardware.  Swelling/bruising noted to the face, L eye shut.  Patient is a SBA, demonstrates steady gait, no assistance needed, only assistance with management of tubes.  Patient ambulates frequently.  POC discussed for the day, bed is locked and in the lowest position, call light is within reach.  All needs are met at this time, hourly rounding is in place.    "

## 2021-06-11 NOTE — PROGRESS NOTES
Pt on legal hold with telesitter and mother present in room.   Pt sitting in bed resting comfortably

## 2021-06-11 NOTE — THERAPY
Speech Language Pathology  Daily Treatment     Patient Name: Greg Tovar  Age:  51 y.o., Sex:  male  Medical Record #: [id number]  Today's Date: 6/10/2021      Assessment    Pt seen on this date for speaking valve trials this session after poor tolerance of speaking valve last SLP session. Pt currently on 4L at 28% Fi02 via tracheal mask. Cuff deflated by SLP.  Sterile suctioning provided which removed minimal secretions. Prior to speaking valve placement the patient's vitals were stable (HR 70, SpO2 96%). Speaking valve placed with audible upper airway noise. Patient was able to complete respiratory strategies with min improvement and clear vocal quality however, upon speaking valve removal after 5 minutes moderate back pressure noted. Speaking valve was place x1 more time for an additional 7 minutes with focus on respiratory training strategies and vocalizations. Speaking valve was removed with only minimal back pressure. Overall patient continues to present with poor upper airway patency for longer durations of speaking valve wear. Discussed with RN and reviewed EMR which indicated possible trach change to a cuffless trach. SLP in agreement with trach change if medically appropriate. Education provided to Pt and his mother regarding PMSV and role of SLP and pt demonstrated understanding of education. At this time, recommend placement of PMSV with trained RN or RT with 1:1 supervision.      Plan    Recommendations: 1) Placement of PMSV with trained staff with 1:1 supervision 2) Consider trach change as medically appropriate.      Continue current treatment plan.     Discharge Recommendations:Recommend post-acute placement for additional speech therapy services prior to discharge home    Objective       06/10/21 1128   Cognitive-Linguistic   Level of Consciousness Alert   Voice   Modulating Loudness Moderate (3)   Respiration Training Severe (2)   Counselling / Education  Severe (2)   Skilled Intervention Compensatory  "Strategies;Gestural Cueing;Verbal Cueing   Patient / Family Goals   Patient / Family Goal #1 \" First step.\"   Goal #1 Outcome Progressing as expected   Short Term Goals   Short Term Goal # 1 The patient will utilize respiratory training strategies to facilitate communication with use of speaking valve at the short phrase level.    Goal Outcome # 1 Progressing slower than expected         "

## 2021-06-11 NOTE — PROGRESS NOTES
LINA Mix, asked this RN to get in touch with facial surgeon to find out if patient's jaw is wired.  Paged Dr Okeefe.

## 2021-06-11 NOTE — DISCHARGE PLANNING
Anticipated Discharge Disposition: Inpatient Psych Hospital.    Action: 6/10/21 - Tomer, Supervisor of  requested an update regarding the original Legal Hold reported missing by SHADE Olivarez CM on 6/6/21.     Per SHADE Mckinnon, the original LH is still missing. LSW placed the copy of the original LH in the patient's chart, per Tomer's request.    Barriers to Discharge: Medical Clearance.    Plan: Inpatient Psych Hospital, pending medical clearance and accepting facility.

## 2021-06-12 PROBLEM — I47.10 SVT (SUPRAVENTRICULAR TACHYCARDIA) (HCC): Status: RESOLVED | Noted: 2021-05-26 | Resolved: 2021-06-12

## 2021-06-12 LAB
ANION GAP SERPL CALC-SCNC: 9 MMOL/L (ref 7–16)
BASOPHILS # BLD AUTO: 0.7 % (ref 0–1.8)
BASOPHILS # BLD: 0.08 K/UL (ref 0–0.12)
BUN SERPL-MCNC: 27 MG/DL (ref 8–22)
CALCIUM SERPL-MCNC: 10 MG/DL (ref 8.5–10.5)
CHLORIDE SERPL-SCNC: 107 MMOL/L (ref 96–112)
CO2 SERPL-SCNC: 33 MMOL/L (ref 20–33)
CREAT SERPL-MCNC: 1.02 MG/DL (ref 0.5–1.4)
EOSINOPHIL # BLD AUTO: 0.14 K/UL (ref 0–0.51)
EOSINOPHIL NFR BLD: 1.3 % (ref 0–6.9)
ERYTHROCYTE [DISTWIDTH] IN BLOOD BY AUTOMATED COUNT: 47.4 FL (ref 35.9–50)
GLUCOSE SERPL-MCNC: 116 MG/DL (ref 65–99)
HCT VFR BLD AUTO: 42.8 % (ref 42–52)
HGB BLD-MCNC: 13 G/DL (ref 14–18)
IMM GRANULOCYTES # BLD AUTO: 0.09 K/UL (ref 0–0.11)
IMM GRANULOCYTES NFR BLD AUTO: 0.8 % (ref 0–0.9)
LYMPHOCYTES # BLD AUTO: 2.62 K/UL (ref 1–4.8)
LYMPHOCYTES NFR BLD: 23.8 % (ref 22–41)
MCH RBC QN AUTO: 29.7 PG (ref 27–33)
MCHC RBC AUTO-ENTMCNC: 30.4 G/DL (ref 33.7–35.3)
MCV RBC AUTO: 97.7 FL (ref 81.4–97.8)
MONOCYTES # BLD AUTO: 1.19 K/UL (ref 0–0.85)
MONOCYTES NFR BLD AUTO: 10.8 % (ref 0–13.4)
NEUTROPHILS # BLD AUTO: 6.91 K/UL (ref 1.82–7.42)
NEUTROPHILS NFR BLD: 62.6 % (ref 44–72)
NRBC # BLD AUTO: 0 K/UL
NRBC BLD-RTO: 0 /100 WBC
PLATELET # BLD AUTO: 522 K/UL (ref 164–446)
PMV BLD AUTO: 10.3 FL (ref 9–12.9)
POTASSIUM SERPL-SCNC: 4.3 MMOL/L (ref 3.6–5.5)
RBC # BLD AUTO: 4.38 M/UL (ref 4.7–6.1)
SODIUM SERPL-SCNC: 149 MMOL/L (ref 135–145)
WBC # BLD AUTO: 11 K/UL (ref 4.8–10.8)

## 2021-06-12 PROCEDURE — 80048 BASIC METABOLIC PNL TOTAL CA: CPT

## 2021-06-12 PROCEDURE — 770001 HCHG ROOM/CARE - MED/SURG/GYN PRIV*

## 2021-06-12 PROCEDURE — 36415 COLL VENOUS BLD VENIPUNCTURE: CPT

## 2021-06-12 PROCEDURE — 700102 HCHG RX REV CODE 250 W/ 637 OVERRIDE(OP): Performed by: SURGERY

## 2021-06-12 PROCEDURE — A9270 NON-COVERED ITEM OR SERVICE: HCPCS | Performed by: NURSE PRACTITIONER

## 2021-06-12 PROCEDURE — A9270 NON-COVERED ITEM OR SERVICE: HCPCS | Performed by: SURGERY

## 2021-06-12 PROCEDURE — 700102 HCHG RX REV CODE 250 W/ 637 OVERRIDE(OP): Performed by: NURSE PRACTITIONER

## 2021-06-12 PROCEDURE — 85025 COMPLETE CBC W/AUTO DIFF WBC: CPT

## 2021-06-12 PROCEDURE — 700111 HCHG RX REV CODE 636 W/ 250 OVERRIDE (IP): Performed by: SURGERY

## 2021-06-12 RX ADMIN — OXYCODONE HYDROCHLORIDE 10 MG: 10 TABLET ORAL at 14:12

## 2021-06-12 RX ADMIN — SULFAMETHOXAZOLE AND TRIMETHOPRIM 1 TABLET: 800; 160 TABLET ORAL at 04:57

## 2021-06-12 RX ADMIN — CHLORHEXIDINE GLUCONATE 0.12% ORAL RINSE 15 ML: 1.2 LIQUID ORAL at 17:23

## 2021-06-12 RX ADMIN — FLUCONAZOLE 200 MG: 200 TABLET ORAL at 04:57

## 2021-06-12 RX ADMIN — OXYCODONE HYDROCHLORIDE 10 MG: 10 TABLET ORAL at 17:31

## 2021-06-12 RX ADMIN — AMIODARONE HYDROCHLORIDE 200 MG: 200 TABLET ORAL at 04:57

## 2021-06-12 RX ADMIN — ENOXAPARIN SODIUM 40 MG: 40 INJECTION SUBCUTANEOUS at 04:58

## 2021-06-12 RX ADMIN — AMOXICILLIN AND CLAVULANATE POTASSIUM 1 TABLET: 875; 125 TABLET, FILM COATED ORAL at 17:23

## 2021-06-12 RX ADMIN — AMOXICILLIN AND CLAVULANATE POTASSIUM 1 TABLET: 875; 125 TABLET, FILM COATED ORAL at 04:57

## 2021-06-12 RX ADMIN — CHLORHEXIDINE GLUCONATE 0.12% ORAL RINSE 15 ML: 1.2 LIQUID ORAL at 04:57

## 2021-06-12 RX ADMIN — OXYCODONE HYDROCHLORIDE 10 MG: 10 TABLET ORAL at 04:57

## 2021-06-12 RX ADMIN — DOCUSATE SODIUM 100 MG: 50 LIQUID ORAL at 04:57

## 2021-06-12 RX ADMIN — SULFAMETHOXAZOLE AND TRIMETHOPRIM 1 TABLET: 800; 160 TABLET ORAL at 17:23

## 2021-06-12 ASSESSMENT — PAIN DESCRIPTION - PAIN TYPE
TYPE: ACUTE PAIN

## 2021-06-12 ASSESSMENT — ENCOUNTER SYMPTOMS
ROS GI COMMENTS: 6/11
GASTROINTESTINAL NEGATIVE: 1
RESPIRATORY NEGATIVE: 1
CONSTITUTIONAL NEGATIVE: 1
HEADACHES: 0
EYE PAIN: 0
NECK PAIN: 0

## 2021-06-12 NOTE — CARE PLAN
The patient is Watcher - Medium risk of patient condition declining or worsening      Problem: Pain - Standard  Goal: Alleviation of pain or a reduction in pain to the patient’s comfort goal  Outcome: Progressing  Note: Patient's pain managed with prescribed medications and nonpharmalogical pain interventions. Education provided to patient about pain rating scale, prescribed medications and nonpharmalogical pain interventions. Patient verbalizes understanding of education. Patient able to rest and sleep comfortably throughout the night      Problem: Psychosocial  Goal: Patient's ability to identify and develop effective coping behaviors will improve  Outcome: Progressing  Note: Patient denies SI, patient appropriate with staff and follows all instructions      Shift Goals  Clinical Goals: Patient Safety  Patient Goals: Pain Management       Progress made toward(s) clinical / shift goals: Patient able to rest and sleep comfortably throughout the night with effective pain management. Appropriate safety interventions in place. Patient remains free from injury

## 2021-06-12 NOTE — CARE PLAN
Problem: Pain - Standard  Goal: Alleviation of pain or a reduction in pain to the patient’s comfort goal  Outcome: Progressing     Problem: Psychosocial  Goal: Patient's ability to identify and develop effective coping behaviors will improve  Outcome: Progressing  Goal: Patient's ability to identify and utilize available support systems will improve  Outcome: Progressing     Problem: Psychosocial  Goal: Patient's level of anxiety will decrease  Outcome: Progressing  Goal: Patient's ability to verbalize feelings about condition will improve  Outcome: Progressing  Goal: Patient's ability to re-evaluate and adapt role responsibilities will improve  Outcome: Progressing  Goal: Patient and family will demonstrate ability to cope with life altering diagnosis and/or procedure  Outcome: Progressing  Goal: Spiritual and cultural needs incorporated into hospitalization  Outcome: Progressing     Problem: Communication  Goal: The ability to communicate needs accurately and effectively will improve  Outcome: Progressing     Problem: Respiratory  Goal: Patient will achieve/maintain optimum respiratory ventilation and gas exchange  Outcome: Progressing     Problem: Mobility  Goal: Patient's capacity to carry out activities will improve  Outcome: Progressing     Problem: Depression  Goal: Patient and family/caregiver will verbalize accurate information about at least two of the possible causes of depression, three-four of the signs and symptoms of depression  Outcome: Progressing   The patient is Stable - Low risk of patient condition declining or worsening    Shift Goals  Clinical Goals: pain control, ambulation with staff  Patient Goals: pain management  Family Goals: Update on plan of care    Progress made toward(s) clinical / shift goals:  pain control, ambulation with staff, maintain safety    Patient is not progressing towards the following goals:

## 2021-06-12 NOTE — PROGRESS NOTES
"Assumed care of patient from [facility] shift RN.  Patient is alert and oriented times 4, denies pain at this time.  On legal hold, telesitter in use.  Patient in room, able to have visitors and belongings, no phone calls at this time.  VSS /69   Pulse 67   Temp 36 °C (96.8 °F) (Temporal)   Resp 18   Ht 1.803 m (5' 11\")   Wt 86.9 kg (191 lb 9.3 oz)   SpO2 97%   BMI 26.72 kg/m²   PIV in the RFA, patent and saline locked.  PIV In the LFA, patent and saline locked.  Trache in place, speaking valve in use per RT/SLP.  On 4L trache mask with saturations in the mid 90s.   in use.  Tele monitored, no events reported overnight.  Last BM 6/11.  Urinating without difficulty.  Strict NPO.  PEG tube to the abdomen, CDI, SHAISTA.  Running Impact Peptide 1.5 at 60mL/hr, which is goal.  Tolerating well.  Denies nausea or vomiting.  Swelling/bruising noted to the face, L eye shut.  Patient is a SBA, demonstrates steady gait, no assistance needed, only assistance with management of tubes.  Patient ambulates frequently.  POC discussed for the day, bed is locked and in the lowest position, call light is within reach.  All needs are met at this time, hourly rounding is in place.  "

## 2021-06-12 NOTE — FLOWSHEET NOTE
06/12/21 1551   Events/Summary/Plan   Events/Summary/Plan Pt remains capped   Vital Signs   Pulse 75   Respiration 17   Pulse Oximetry 97 %   Respiratory Assessment   Level of Consciousness Alert   Chest Exam   Work Of Breathing / Effort Mild   Breath Sounds   RUL Breath Sounds Clear   RML Breath Sounds Diminished   RLL Breath Sounds Diminished   ABNER Breath Sounds Clear   LLL Breath Sounds Diminished   Secretions   Cough Non Productive   Airway Trach Tracheostomy 6.0   Placement Date/Time: 06/10/21 1613   Airway Type: Trach  Brand: Prudence  Style: Uncuffed  Airway Location: Tracheostomy  Airway Size: 6.0  Inserted In: (c) Other (comment)  Inserted by: Respiratory care practitioner   Site Assessment Intact   Airway Tube Secured Velcro attachment   Cuffless Yes   Status Capped   Extra Tracheostomy Tube at Bedside Yes

## 2021-06-12 NOTE — PROGRESS NOTES
Pt A&Ox4  Patient acknowledges understanding of education and answers questions appropriately, patient demonstrates proper use of call light    Patient rates pain as 8/10 upon assessment, medicated per MAR     Tolerating NPO diet, Impact peptide 1.5 running at goal of 60mL/hr, denies n/v. +bowel sounds, + flatus, LBM 6/11, +void     Saturating >90% on 3L via NC   Patient denies SOB   Trach is capped    PEG tube to abdomen CDI   Tele cardiac monitor in place  Tele sitter in place for patient safety     Swelling to face  Left eye shut    Pt ambulates with standby assistance and a steady gait     Patient verbalizes understanding to call when needing assistance     Updated on plan of care. Safety education provided. Bed locked in low. Call light within reach. Rounding in place.
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
## 2021-06-13 LAB
BASOPHILS # BLD AUTO: 0.9 % (ref 0–1.8)
BASOPHILS # BLD: 0.09 K/UL (ref 0–0.12)
EOSINOPHIL # BLD AUTO: 0.19 K/UL (ref 0–0.51)
EOSINOPHIL NFR BLD: 1.8 % (ref 0–6.9)
ERYTHROCYTE [DISTWIDTH] IN BLOOD BY AUTOMATED COUNT: 46.7 FL (ref 35.9–50)
HCT VFR BLD AUTO: 43.3 % (ref 42–52)
HGB BLD-MCNC: 13.5 G/DL (ref 14–18)
IMM GRANULOCYTES # BLD AUTO: 0.06 K/UL (ref 0–0.11)
IMM GRANULOCYTES NFR BLD AUTO: 0.6 % (ref 0–0.9)
LYMPHOCYTES # BLD AUTO: 2.43 K/UL (ref 1–4.8)
LYMPHOCYTES NFR BLD: 23.5 % (ref 22–41)
MCH RBC QN AUTO: 30 PG (ref 27–33)
MCHC RBC AUTO-ENTMCNC: 31.2 G/DL (ref 33.7–35.3)
MCV RBC AUTO: 96.2 FL (ref 81.4–97.8)
MONOCYTES # BLD AUTO: 1.27 K/UL (ref 0–0.85)
MONOCYTES NFR BLD AUTO: 12.3 % (ref 0–13.4)
NEUTROPHILS # BLD AUTO: 6.32 K/UL (ref 1.82–7.42)
NEUTROPHILS NFR BLD: 60.9 % (ref 44–72)
NRBC # BLD AUTO: 0 K/UL
NRBC BLD-RTO: 0 /100 WBC
PLATELET # BLD AUTO: 467 K/UL (ref 164–446)
PMV BLD AUTO: 9.7 FL (ref 9–12.9)
RBC # BLD AUTO: 4.5 M/UL (ref 4.7–6.1)
WBC # BLD AUTO: 10.4 K/UL (ref 4.8–10.8)

## 2021-06-13 PROCEDURE — 85025 COMPLETE CBC W/AUTO DIFF WBC: CPT

## 2021-06-13 PROCEDURE — 92610 EVALUATE SWALLOWING FUNCTION: CPT

## 2021-06-13 PROCEDURE — A9270 NON-COVERED ITEM OR SERVICE: HCPCS | Performed by: SURGERY

## 2021-06-13 PROCEDURE — A9270 NON-COVERED ITEM OR SERVICE: HCPCS | Performed by: NURSE PRACTITIONER

## 2021-06-13 PROCEDURE — 770001 HCHG ROOM/CARE - MED/SURG/GYN PRIV*

## 2021-06-13 PROCEDURE — 700102 HCHG RX REV CODE 250 W/ 637 OVERRIDE(OP): Performed by: NURSE PRACTITIONER

## 2021-06-13 PROCEDURE — 700111 HCHG RX REV CODE 636 W/ 250 OVERRIDE (IP): Performed by: SURGERY

## 2021-06-13 PROCEDURE — 700102 HCHG RX REV CODE 250 W/ 637 OVERRIDE(OP): Performed by: SURGERY

## 2021-06-13 PROCEDURE — 36415 COLL VENOUS BLD VENIPUNCTURE: CPT

## 2021-06-13 RX ADMIN — ENOXAPARIN SODIUM 40 MG: 40 INJECTION SUBCUTANEOUS at 04:26

## 2021-06-13 RX ADMIN — CHLORHEXIDINE GLUCONATE 0.12% ORAL RINSE 15 ML: 1.2 LIQUID ORAL at 04:27

## 2021-06-13 RX ADMIN — FLUCONAZOLE 200 MG: 200 TABLET ORAL at 04:26

## 2021-06-13 RX ADMIN — DOCUSATE SODIUM 100 MG: 50 LIQUID ORAL at 16:51

## 2021-06-13 RX ADMIN — OXYCODONE HYDROCHLORIDE 10 MG: 10 TABLET ORAL at 22:20

## 2021-06-13 RX ADMIN — AMOXICILLIN AND CLAVULANATE POTASSIUM 1 TABLET: 875; 125 TABLET, FILM COATED ORAL at 04:26

## 2021-06-13 RX ADMIN — SULFAMETHOXAZOLE AND TRIMETHOPRIM 1 TABLET: 800; 160 TABLET ORAL at 16:50

## 2021-06-13 RX ADMIN — OXYCODONE HYDROCHLORIDE 10 MG: 10 TABLET ORAL at 11:28

## 2021-06-13 RX ADMIN — OXYCODONE HYDROCHLORIDE 10 MG: 10 TABLET ORAL at 01:39

## 2021-06-13 RX ADMIN — CHLORHEXIDINE GLUCONATE 0.12% ORAL RINSE 15 ML: 1.2 LIQUID ORAL at 16:51

## 2021-06-13 RX ADMIN — SULFAMETHOXAZOLE AND TRIMETHOPRIM 1 TABLET: 800; 160 TABLET ORAL at 04:26

## 2021-06-13 RX ADMIN — AMOXICILLIN AND CLAVULANATE POTASSIUM 1 TABLET: 875; 125 TABLET, FILM COATED ORAL at 16:51

## 2021-06-13 RX ADMIN — OXYCODONE HYDROCHLORIDE 10 MG: 10 TABLET ORAL at 16:51

## 2021-06-13 ASSESSMENT — PAIN DESCRIPTION - PAIN TYPE
TYPE: ACUTE PAIN

## 2021-06-13 ASSESSMENT — ENCOUNTER SYMPTOMS
HEADACHES: 0
EYE PAIN: 0
ROS GI COMMENTS: 6/13
CONSTITUTIONAL NEGATIVE: 1
GASTROINTESTINAL NEGATIVE: 1
RESPIRATORY NEGATIVE: 1
NECK PAIN: 0

## 2021-06-13 NOTE — CARE PLAN
Problem: Pain - Standard  Goal: Alleviation of pain or a reduction in pain to the patient’s comfort goal  6/13/2021 1530 by Maira Garner R.N.  Outcome: Progressing  Note: Education provided regarding non pharmacologic and pharmacologic modalities for pain management. Medications administered per MAR. Patient educated regarding medications and side effects. Education provided regarding pain management and pain rating scale.    6/13/2021 1530 by Maira Garner R.N.  Reactivated     Problem: Communication  Goal: The ability to communicate needs accurately and effectively will improve  Outcome: Progressing  Note: Reviewed POC with patient. All questions and concerns addressed at this time.  Facilitated communication between patient, patient support system and MD's     The patient is Stable - Low risk of patient condition declining or worsening    Shift Goals  Clinical Goals: pain management, OOB activity  Patient Goals: rest  Family Goals: Update on plan of care    Progress made toward(s) clinical / shift goals:  pain managed per MAR. Patient ambulating in room. Patient able to rest between bursts of activity    Patient is not progressing towards the following goals:

## 2021-06-13 NOTE — PROGRESS NOTES
Assumed care of patient at 0645. Bedside report received. Assessment complete.  AA&Ox4. Denies CP/SOB.  Reporting 6/10 pain. Declined intervention at this time.   Educated patient regarding pharmacologic and non pharmacologic modalities for pain management.  Skin per flowsheets  Tolerating impact peptide running at 60ml/hour. Free water flush per order. Denies N/V.  + void. + BM.   Pt ambulates SBA.  Legal hold in place. Paperwork scanned in, hard copy not found in chart. Telesitter in place.  Plan of care discussed, all questions answered. Educated on the importance of calling before getting OOB and pt verbalizes understanding. Educated regarding importance of oral care. Oral care kit at bedside. Bed alarm on and audible for safety. Call light is within reach, treaded slipper socks on, bed in lowest/ locked position, hourly rounding in place, all needs met at this time
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
## 2021-06-13 NOTE — PROGRESS NOTES
Called plastic surgeon group. This RN spoke with Dr. Hough (on call) to clarify if patient can be decannulated and if patients underbite will be addressed. Dr. Faye will defer to Dr. Okeefe who will be able to be reached via the office this week.

## 2021-06-13 NOTE — FLOWSHEET NOTE
06/13/21 1430   Events/Summary/Plan   Events/Summary/Plan PT Capped but stable on RA   Vital Signs   Pulse 70   Respiration 18   Pulse Oximetry 94 %   Chest Exam   Work Of Breathing / Effort Mild   Breath Sounds   RUL Breath Sounds Clear   RML Breath Sounds Diminished   RLL Breath Sounds Diminished   ABNER Breath Sounds Clear   LLL Breath Sounds Diminished   Airway Trach Tracheostomy 6.0   Placement Date/Time: 06/10/21 1613   Airway Type: Trach  Brand: Prudence  Style: Uncuffed  Airway Location: Tracheostomy  Airway Size: 6.0  Inserted In: (c) Other (comment)  Inserted by: Respiratory care practitioner   Site Assessment Intact   Airway Tube Secured Velcro attachment   Cuffless Yes   Status Capped   Oxygen   O2 Delivery Device None - Room Air

## 2021-06-13 NOTE — PROGRESS NOTES
Pt A&Ox4  Patient acknowledges understanding of education and answers questions appropriately, patient demonstrates proper use of call light     Patient's pain managed with prescribed medications and nonpharmalogical pain interventions      Tolerating NPO diet, Impact peptide 1.5 running at goal of 60mL/hr, denies n/v. +bowel sounds, + flatus, LBM 6/12, +void      Saturating >90% on 3L via NC   Patient denies SOB   Trach is capped     PEG tube to abdomen CDI   Tele cardiac monitor in place  Tele sitter in place for patient safety      Swelling to face  Left eye shut     Pt ambulates with standby assistance and a steady gait      Patient verbalizes understanding to call when needing assistance      Updated on plan of care. Safety education provided. Bed locked in low. Call light within reach. Rounding in place.
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
## 2021-06-14 PROBLEM — I47.10 SVT (SUPRAVENTRICULAR TACHYCARDIA) (HCC): Status: RESOLVED | Noted: 2021-05-26 | Resolved: 2021-06-14

## 2021-06-14 LAB
ANION GAP SERPL CALC-SCNC: 7 MMOL/L (ref 7–16)
BASOPHILS # BLD AUTO: 0.7 % (ref 0–1.8)
BASOPHILS # BLD: 0.08 K/UL (ref 0–0.12)
BUN SERPL-MCNC: 30 MG/DL (ref 8–22)
CALCIUM SERPL-MCNC: 10 MG/DL (ref 8.5–10.5)
CHLORIDE SERPL-SCNC: 108 MMOL/L (ref 96–112)
CO2 SERPL-SCNC: 34 MMOL/L (ref 20–33)
CREAT SERPL-MCNC: 1 MG/DL (ref 0.5–1.4)
CRP SERPL HS-MCNC: 0.36 MG/DL (ref 0–0.75)
EOSINOPHIL # BLD AUTO: 0.16 K/UL (ref 0–0.51)
EOSINOPHIL NFR BLD: 1.4 % (ref 0–6.9)
ERYTHROCYTE [DISTWIDTH] IN BLOOD BY AUTOMATED COUNT: 47 FL (ref 35.9–50)
GLUCOSE SERPL-MCNC: 126 MG/DL (ref 65–99)
HCT VFR BLD AUTO: 44.6 % (ref 42–52)
HGB BLD-MCNC: 13.8 G/DL (ref 14–18)
IMM GRANULOCYTES # BLD AUTO: 0.08 K/UL (ref 0–0.11)
IMM GRANULOCYTES NFR BLD AUTO: 0.7 % (ref 0–0.9)
LYMPHOCYTES # BLD AUTO: 3.04 K/UL (ref 1–4.8)
LYMPHOCYTES NFR BLD: 26.1 % (ref 22–41)
MAGNESIUM SERPL-MCNC: 2.5 MG/DL (ref 1.5–2.5)
MCH RBC QN AUTO: 30.2 PG (ref 27–33)
MCHC RBC AUTO-ENTMCNC: 30.9 G/DL (ref 33.7–35.3)
MCV RBC AUTO: 97.6 FL (ref 81.4–97.8)
MONOCYTES # BLD AUTO: 1.2 K/UL (ref 0–0.85)
MONOCYTES NFR BLD AUTO: 10.3 % (ref 0–13.4)
NEUTROPHILS # BLD AUTO: 7.1 K/UL (ref 1.82–7.42)
NEUTROPHILS NFR BLD: 60.8 % (ref 44–72)
NRBC # BLD AUTO: 0 K/UL
NRBC BLD-RTO: 0 /100 WBC
PHOSPHATE SERPL-MCNC: 3.2 MG/DL (ref 2.5–4.5)
PLATELET # BLD AUTO: 463 K/UL (ref 164–446)
PMV BLD AUTO: 10.2 FL (ref 9–12.9)
POTASSIUM SERPL-SCNC: 5.2 MMOL/L (ref 3.6–5.5)
PREALB SERPL-MCNC: 33.3 MG/DL (ref 18–38)
RBC # BLD AUTO: 4.57 M/UL (ref 4.7–6.1)
SODIUM SERPL-SCNC: 149 MMOL/L (ref 135–145)
WBC # BLD AUTO: 11.7 K/UL (ref 4.8–10.8)

## 2021-06-14 PROCEDURE — 83735 ASSAY OF MAGNESIUM: CPT

## 2021-06-14 PROCEDURE — 90834 PSYTX W PT 45 MINUTES: CPT | Performed by: PSYCHOLOGIST

## 2021-06-14 PROCEDURE — A9270 NON-COVERED ITEM OR SERVICE: HCPCS | Performed by: NURSE PRACTITIONER

## 2021-06-14 PROCEDURE — 700111 HCHG RX REV CODE 636 W/ 250 OVERRIDE (IP): Performed by: SURGERY

## 2021-06-14 PROCEDURE — 80048 BASIC METABOLIC PNL TOTAL CA: CPT

## 2021-06-14 PROCEDURE — 84100 ASSAY OF PHOSPHORUS: CPT

## 2021-06-14 PROCEDURE — 36415 COLL VENOUS BLD VENIPUNCTURE: CPT

## 2021-06-14 PROCEDURE — 770001 HCHG ROOM/CARE - MED/SURG/GYN PRIV*

## 2021-06-14 PROCEDURE — 85025 COMPLETE CBC W/AUTO DIFF WBC: CPT

## 2021-06-14 PROCEDURE — 86140 C-REACTIVE PROTEIN: CPT

## 2021-06-14 PROCEDURE — 700102 HCHG RX REV CODE 250 W/ 637 OVERRIDE(OP): Performed by: SURGERY

## 2021-06-14 PROCEDURE — A9270 NON-COVERED ITEM OR SERVICE: HCPCS | Performed by: SURGERY

## 2021-06-14 PROCEDURE — 700102 HCHG RX REV CODE 250 W/ 637 OVERRIDE(OP): Performed by: NURSE PRACTITIONER

## 2021-06-14 PROCEDURE — 84134 ASSAY OF PREALBUMIN: CPT

## 2021-06-14 RX ADMIN — CHLORHEXIDINE GLUCONATE 0.12% ORAL RINSE 15 ML: 1.2 LIQUID ORAL at 18:00

## 2021-06-14 RX ADMIN — AMOXICILLIN AND CLAVULANATE POTASSIUM 1 TABLET: 875; 125 TABLET, FILM COATED ORAL at 05:30

## 2021-06-14 RX ADMIN — OXYCODONE 5 MG: 5 TABLET ORAL at 05:30

## 2021-06-14 RX ADMIN — SULFAMETHOXAZOLE AND TRIMETHOPRIM 1 TABLET: 800; 160 TABLET ORAL at 18:00

## 2021-06-14 RX ADMIN — OXYCODONE 5 MG: 5 TABLET ORAL at 15:11

## 2021-06-14 RX ADMIN — SULFAMETHOXAZOLE AND TRIMETHOPRIM 1 TABLET: 800; 160 TABLET ORAL at 05:29

## 2021-06-14 RX ADMIN — AMOXICILLIN AND CLAVULANATE POTASSIUM 1 TABLET: 875; 125 TABLET, FILM COATED ORAL at 18:00

## 2021-06-14 RX ADMIN — FLUCONAZOLE 200 MG: 200 TABLET ORAL at 05:30

## 2021-06-14 RX ADMIN — DOCUSATE SODIUM 50 MG AND SENNOSIDES 8.6 MG 1 TABLET: 8.6; 5 TABLET, FILM COATED ORAL at 22:01

## 2021-06-14 RX ADMIN — ENOXAPARIN SODIUM 40 MG: 40 INJECTION SUBCUTANEOUS at 05:31

## 2021-06-14 RX ADMIN — OXYCODONE HYDROCHLORIDE 10 MG: 10 TABLET ORAL at 22:05

## 2021-06-14 RX ADMIN — CHLORHEXIDINE GLUCONATE 0.12% ORAL RINSE 15 ML: 1.2 LIQUID ORAL at 05:30

## 2021-06-14 ASSESSMENT — ENCOUNTER SYMPTOMS
EYE PAIN: 0
GASTROINTESTINAL NEGATIVE: 1
HEADACHES: 0
RESPIRATORY NEGATIVE: 1
NECK PAIN: 0
CONSTITUTIONAL NEGATIVE: 1
ROS GI COMMENTS: 6/13

## 2021-06-14 ASSESSMENT — PAIN DESCRIPTION - PAIN TYPE
TYPE: ACUTE PAIN

## 2021-06-14 NOTE — PROGRESS NOTES
Report received at start of shift.  Pt has active legal hold order and tele-sitter is present at bedside.  Assessment complete.  A&O x 4. Patient calls appropriately.  Patient mobilizes with standby assist. Bed alarm on.   Patient reports 7/10 pain. Medication provided per MAR  Denies N&V. NPO at this time.  + void, + flatus, last BM 6/13.  Patient denies SOB.    Reviewed plan with of care with patient. Call light and personal belongings with in reach. Hourly rounding in place. All needs met at this time.

## 2021-06-14 NOTE — PROGRESS NOTES
Bedside report received.  Assessment complete.  A&O x 4. Patient calls appropriately.  Patient ambulates with SBA assist. Bed alarm on.   Patient has 3/10 pain. Pain managed with prescribed medications.  Denies N&V. Strict NPO at this time. Receiving impact peptide at 60 ml/h (goal) through G-tube  Sutures in place to left, eye, CDI. LUQ G-tube, SHAISTA. Trache with cap in place, patient tolerating well.   + void, - flatus, - BM.  Patient denies SOB.  SCD's off.  Patient on legal hold with tele sitter in place  .  Review plan with of care with patient. Call light and personal belongings with in reach. Hourly rounding in place. All needs met at this time.
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
## 2021-06-14 NOTE — DISCHARGE PLANNING
Anticipated Discharge Disposition: Inpatient Psych Hospital.    Action: This case case discussed today during IDT Rounds. Per [facility] A[facility]P.R.N this patient continues on the  and is not medically cleared.    Barriers to Discharge: Medical Clearance.    Plan: Inpatient Psych Hospital, pending medical clearance.

## 2021-06-15 LAB
ANION GAP SERPL CALC-SCNC: 6 MMOL/L (ref 7–16)
BASOPHILS # BLD AUTO: 0.5 % (ref 0–1.8)
BASOPHILS # BLD: 0.06 K/UL (ref 0–0.12)
BUN SERPL-MCNC: 27 MG/DL (ref 8–22)
CALCIUM SERPL-MCNC: 9.7 MG/DL (ref 8.5–10.5)
CHLORIDE SERPL-SCNC: 109 MMOL/L (ref 96–112)
CO2 SERPL-SCNC: 33 MMOL/L (ref 20–33)
CREAT SERPL-MCNC: 0.98 MG/DL (ref 0.5–1.4)
EOSINOPHIL # BLD AUTO: 0.42 K/UL (ref 0–0.51)
EOSINOPHIL NFR BLD: 3.7 % (ref 0–6.9)
ERYTHROCYTE [DISTWIDTH] IN BLOOD BY AUTOMATED COUNT: 47.9 FL (ref 35.9–50)
GLUCOSE SERPL-MCNC: 108 MG/DL (ref 65–99)
HCT VFR BLD AUTO: 44.6 % (ref 42–52)
HGB BLD-MCNC: 13.8 G/DL (ref 14–18)
IMM GRANULOCYTES # BLD AUTO: 0.05 K/UL (ref 0–0.11)
IMM GRANULOCYTES NFR BLD AUTO: 0.4 % (ref 0–0.9)
LYMPHOCYTES # BLD AUTO: 2.65 K/UL (ref 1–4.8)
LYMPHOCYTES NFR BLD: 23.3 % (ref 22–41)
MCH RBC QN AUTO: 29.9 PG (ref 27–33)
MCHC RBC AUTO-ENTMCNC: 30.9 G/DL (ref 33.7–35.3)
MCV RBC AUTO: 96.7 FL (ref 81.4–97.8)
MONOCYTES # BLD AUTO: 1.33 K/UL (ref 0–0.85)
MONOCYTES NFR BLD AUTO: 11.7 % (ref 0–13.4)
NEUTROPHILS # BLD AUTO: 6.84 K/UL (ref 1.82–7.42)
NEUTROPHILS NFR BLD: 60.4 % (ref 44–72)
NRBC # BLD AUTO: 0 K/UL
NRBC BLD-RTO: 0 /100 WBC
PLATELET # BLD AUTO: 425 K/UL (ref 164–446)
PMV BLD AUTO: 10.3 FL (ref 9–12.9)
POTASSIUM SERPL-SCNC: 4.4 MMOL/L (ref 3.6–5.5)
RBC # BLD AUTO: 4.61 M/UL (ref 4.7–6.1)
SODIUM SERPL-SCNC: 148 MMOL/L (ref 135–145)
WBC # BLD AUTO: 11.4 K/UL (ref 4.8–10.8)

## 2021-06-15 PROCEDURE — A9270 NON-COVERED ITEM OR SERVICE: HCPCS | Performed by: NURSE PRACTITIONER

## 2021-06-15 PROCEDURE — 700102 HCHG RX REV CODE 250 W/ 637 OVERRIDE(OP): Performed by: NURSE PRACTITIONER

## 2021-06-15 PROCEDURE — 700102 HCHG RX REV CODE 250 W/ 637 OVERRIDE(OP): Performed by: SURGERY

## 2021-06-15 PROCEDURE — 80048 BASIC METABOLIC PNL TOTAL CA: CPT

## 2021-06-15 PROCEDURE — A9270 NON-COVERED ITEM OR SERVICE: HCPCS | Performed by: SURGERY

## 2021-06-15 PROCEDURE — 770001 HCHG ROOM/CARE - MED/SURG/GYN PRIV*

## 2021-06-15 PROCEDURE — 700111 HCHG RX REV CODE 636 W/ 250 OVERRIDE (IP): Performed by: SURGERY

## 2021-06-15 PROCEDURE — 36415 COLL VENOUS BLD VENIPUNCTURE: CPT

## 2021-06-15 PROCEDURE — 85025 COMPLETE CBC W/AUTO DIFF WBC: CPT

## 2021-06-15 RX ADMIN — DOCUSATE SODIUM 100 MG: 50 LIQUID ORAL at 06:07

## 2021-06-15 RX ADMIN — SULFAMETHOXAZOLE AND TRIMETHOPRIM 1 TABLET: 800; 160 TABLET ORAL at 17:58

## 2021-06-15 RX ADMIN — AMOXICILLIN AND CLAVULANATE POTASSIUM 1 TABLET: 875; 125 TABLET, FILM COATED ORAL at 06:07

## 2021-06-15 RX ADMIN — CHLORHEXIDINE GLUCONATE 0.12% ORAL RINSE 15 ML: 1.2 LIQUID ORAL at 17:58

## 2021-06-15 RX ADMIN — ENOXAPARIN SODIUM 40 MG: 40 INJECTION SUBCUTANEOUS at 06:07

## 2021-06-15 RX ADMIN — OXYCODONE 5 MG: 5 TABLET ORAL at 06:07

## 2021-06-15 RX ADMIN — SULFAMETHOXAZOLE AND TRIMETHOPRIM 1 TABLET: 800; 160 TABLET ORAL at 06:06

## 2021-06-15 RX ADMIN — OXYCODONE HYDROCHLORIDE 10 MG: 10 TABLET ORAL at 13:26

## 2021-06-15 RX ADMIN — AMOXICILLIN AND CLAVULANATE POTASSIUM 1 TABLET: 875; 125 TABLET, FILM COATED ORAL at 17:58

## 2021-06-15 RX ADMIN — CHLORHEXIDINE GLUCONATE 0.12% ORAL RINSE 15 ML: 1.2 LIQUID ORAL at 06:07

## 2021-06-15 RX ADMIN — OXYCODONE 5 MG: 5 TABLET ORAL at 17:58

## 2021-06-15 RX ADMIN — FLUCONAZOLE 200 MG: 200 TABLET ORAL at 06:07

## 2021-06-15 RX ADMIN — POLYETHYLENE GLYCOL 3350 1 PACKET: 17 POWDER, FOR SOLUTION ORAL at 06:22

## 2021-06-15 RX ADMIN — MAGNESIUM HYDROXIDE 30 ML: 400 SUSPENSION ORAL at 06:07

## 2021-06-15 ASSESSMENT — PAIN DESCRIPTION - PAIN TYPE
TYPE: ACUTE PAIN

## 2021-06-15 ASSESSMENT — ENCOUNTER SYMPTOMS
HEADACHES: 0
NECK PAIN: 0
GASTROINTESTINAL NEGATIVE: 1
CONSTITUTIONAL NEGATIVE: 1
ROS GI COMMENTS: 6/13
EYE PAIN: 0
RESPIRATORY NEGATIVE: 1
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
## 2021-06-15 NOTE — PROGRESS NOTES
Bedside report received.  Assessment complete.  A&O x 4. Patient calls appropriately.  Patient ambulates with no assist.    Patient has 0/10 pain. Pain managed with prescribed medications.  Denies N&V. Strict NPO at this time. Receiving impact peptide at 60 ml/h (goal) through G-tube.  Sutures in place to left, eye, CDI. LUQ G-tube, SHAISTA. Trache with cap in place, patient tolerating well.   + void, - flatus, - BM.  Patient denies SOB.  SCD's off  Patient on legal hold with tele sitter in place.  Review plan with of care with patient. Call light and personal belongings with in reach. Hourly rounding in place. All needs met at this time.

## 2021-06-15 NOTE — THERAPY
Speech Therapy Contact Note     Patient Name: Greg Tovar  Age:  51 y.o., Sex:  male  Medical Record #: [id number]  Today's Date: 6/15/2021         06/15/21 1517   Interdisciplinary Plan of Care Collaboration   IDT Collaboration with  Speech Therapist;Nursing;Nurse Practitioner   Collaboration Comments Discussed plan of care with RN and nurse practioner regarding patients underbite/restricted ROM of jaw impacting swallow and speech. Recommend an MBSS to further evaluate overall oral and pharyngeal phases, mechanism of jaw movement and ability to manipulate a bolus. NP in agreement. Will need clarification/permission from plastic/maxillofacial surgeon prior to proceeding with therapy to work on increasing range of motion of the jaw. ?Range of motion impacted by stiff muscles or physiology of structure of joints

## 2021-06-15 NOTE — CARE PLAN
The patient is Watcher - Medium risk of patient condition declining or worsening    Shift Goals  Clinical Goals: pain management, increase knowledge of POC  Patient Goals:   Family Goals:    Progress made toward(s) clinical / shift goals: review plan of care with patient, encourage patient to ask questions and voice concerns; update patient regarding changes in plan of care; assess pain Q2-4H, administer pain medication as indicated     Patient is not progressing towards the following goals:      Problem: Pain - Standard  Goal: Alleviation of pain or a reduction in pain to the patient’s comfort goal  Outcome: Progressing     Problem: Communication  Goal: The ability to communicate needs accurately and effectively will improve  Outcome: Progressing

## 2021-06-16 ENCOUNTER — APPOINTMENT (OUTPATIENT)
Dept: RADIOLOGY | Facility: MEDICAL CENTER | Age: 51
DRG: 003 | End: 2021-06-16
Attending: SPECIALIST
Payer: MEDICAID

## 2021-06-16 LAB
ANION GAP SERPL CALC-SCNC: 10 MMOL/L (ref 7–16)
BASOPHILS # BLD AUTO: 0.5 % (ref 0–1.8)
BASOPHILS # BLD: 0.05 K/UL (ref 0–0.12)
BUN SERPL-MCNC: 28 MG/DL (ref 8–22)
CALCIUM SERPL-MCNC: 9.5 MG/DL (ref 8.5–10.5)
CHLORIDE SERPL-SCNC: 110 MMOL/L (ref 96–112)
CO2 SERPL-SCNC: 30 MMOL/L (ref 20–33)
CREAT SERPL-MCNC: 0.96 MG/DL (ref 0.5–1.4)
EOSINOPHIL # BLD AUTO: 0.44 K/UL (ref 0–0.51)
EOSINOPHIL NFR BLD: 4.3 % (ref 0–6.9)
ERYTHROCYTE [DISTWIDTH] IN BLOOD BY AUTOMATED COUNT: 47.7 FL (ref 35.9–50)
GLUCOSE SERPL-MCNC: 98 MG/DL (ref 65–99)
HCT VFR BLD AUTO: 46.1 % (ref 42–52)
HGB BLD-MCNC: 14 G/DL (ref 14–18)
IMM GRANULOCYTES # BLD AUTO: 0.05 K/UL (ref 0–0.11)
IMM GRANULOCYTES NFR BLD AUTO: 0.5 % (ref 0–0.9)
LYMPHOCYTES # BLD AUTO: 2.61 K/UL (ref 1–4.8)
LYMPHOCYTES NFR BLD: 25.7 % (ref 22–41)
MCH RBC QN AUTO: 29.4 PG (ref 27–33)
MCHC RBC AUTO-ENTMCNC: 30.4 G/DL (ref 33.7–35.3)
MCV RBC AUTO: 96.6 FL (ref 81.4–97.8)
MONOCYTES # BLD AUTO: 1.09 K/UL (ref 0–0.85)
MONOCYTES NFR BLD AUTO: 10.7 % (ref 0–13.4)
NEUTROPHILS # BLD AUTO: 5.91 K/UL (ref 1.82–7.42)
NEUTROPHILS NFR BLD: 58.3 % (ref 44–72)
NRBC # BLD AUTO: 0 K/UL
NRBC BLD-RTO: 0 /100 WBC
PLATELET # BLD AUTO: 300 K/UL (ref 164–446)
PMV BLD AUTO: 10.8 FL (ref 9–12.9)
POTASSIUM SERPL-SCNC: 4.2 MMOL/L (ref 3.6–5.5)
RBC # BLD AUTO: 4.77 M/UL (ref 4.7–6.1)
SODIUM SERPL-SCNC: 150 MMOL/L (ref 135–145)
WBC # BLD AUTO: 10.2 K/UL (ref 4.8–10.8)

## 2021-06-16 PROCEDURE — 700111 HCHG RX REV CODE 636 W/ 250 OVERRIDE (IP): Performed by: SURGERY

## 2021-06-16 PROCEDURE — 700102 HCHG RX REV CODE 250 W/ 637 OVERRIDE(OP): Performed by: NURSE PRACTITIONER

## 2021-06-16 PROCEDURE — 770001 HCHG ROOM/CARE - MED/SURG/GYN PRIV*

## 2021-06-16 PROCEDURE — 80048 BASIC METABOLIC PNL TOTAL CA: CPT

## 2021-06-16 PROCEDURE — 36415 COLL VENOUS BLD VENIPUNCTURE: CPT

## 2021-06-16 PROCEDURE — A9270 NON-COVERED ITEM OR SERVICE: HCPCS | Performed by: NURSE PRACTITIONER

## 2021-06-16 PROCEDURE — 71045 X-RAY EXAM CHEST 1 VIEW: CPT

## 2021-06-16 PROCEDURE — 85025 COMPLETE CBC W/AUTO DIFF WBC: CPT

## 2021-06-16 PROCEDURE — A9270 NON-COVERED ITEM OR SERVICE: HCPCS | Performed by: SURGERY

## 2021-06-16 PROCEDURE — 700102 HCHG RX REV CODE 250 W/ 637 OVERRIDE(OP): Performed by: SURGERY

## 2021-06-16 RX ADMIN — AMOXICILLIN AND CLAVULANATE POTASSIUM 1 TABLET: 875; 125 TABLET, FILM COATED ORAL at 17:10

## 2021-06-16 RX ADMIN — SULFAMETHOXAZOLE AND TRIMETHOPRIM 1 TABLET: 800; 160 TABLET ORAL at 04:23

## 2021-06-16 RX ADMIN — CHLORHEXIDINE GLUCONATE 0.12% ORAL RINSE 15 ML: 1.2 LIQUID ORAL at 17:10

## 2021-06-16 RX ADMIN — CHLORHEXIDINE GLUCONATE 0.12% ORAL RINSE 15 ML: 1.2 LIQUID ORAL at 04:23

## 2021-06-16 RX ADMIN — FLUCONAZOLE 200 MG: 200 TABLET ORAL at 04:23

## 2021-06-16 RX ADMIN — AMOXICILLIN AND CLAVULANATE POTASSIUM 1 TABLET: 875; 125 TABLET, FILM COATED ORAL at 04:23

## 2021-06-16 RX ADMIN — SULFAMETHOXAZOLE AND TRIMETHOPRIM 1 TABLET: 800; 160 TABLET ORAL at 17:10

## 2021-06-16 RX ADMIN — OXYCODONE HYDROCHLORIDE 10 MG: 10 TABLET ORAL at 00:22

## 2021-06-16 RX ADMIN — ENOXAPARIN SODIUM 40 MG: 40 INJECTION SUBCUTANEOUS at 04:23

## 2021-06-16 RX ADMIN — OXYCODONE 5 MG: 5 TABLET ORAL at 09:48

## 2021-06-16 ASSESSMENT — PAIN DESCRIPTION - PAIN TYPE
TYPE: ACUTE PAIN

## 2021-06-16 ASSESSMENT — FIBROSIS 4 INDEX: FIB4 SCORE: 0.92

## 2021-06-16 NOTE — PROGRESS NOTES
Bedside report received.  Assessment complete.  A&O x 4. Patient calls appropriately. Tele-sitter in room. Patient on legal hold for SI. Personal belongings permitted in room per Psych.  Patient up with SB assist.   Patient has 0/10 pain.   Denies N&V. Tube feeding in place.  Dressing to old trach site changed. Green drainage noted.  + void  Patient reports SOB at times. Patient satting at 93% on room air.  Review plan with of care with patient. Call light and personal belongings with in reach. Hourly rounding in place. All needs met at this time.

## 2021-06-16 NOTE — PROGRESS NOTES
Received report from previous shift RN  Assessment complete.  A&O x 4. Patient calls appropriately.  Patient ambulates with no assist.   Legal hold.Tele sitter in use   Patient has 4/10 pain. Pain managed with prescribed medications.  Denies N&V. NPO at this time. G tube with impact peptide at 60mL/hour . Old trach site with gauze/ tape  Sutures to L eye. L eye partially closed.  Pt's mouth/ back of tongue with dark dried blood/ scabbing and yellow mucus build- up. Pt unable to open mouth fully, unable to fully clean. APRN updated  + void, + flatus, 6/15 BM.   Patient denies SOB.  SCD's off.  Review plan with of care with patient. Call light and personal belongings with in reach. Hourly rounding in place. All needs met at this time.

## 2021-06-16 NOTE — CARE PLAN
The patient is Stable - Low risk of patient condition declining or worsening    Shift Goals  Clinical Goals: pain managent  Patient Goals: go outside   Family Goals: Update on plan of care    Progress made toward(s) clinical / shift goals: assess pain Q2-4H, administer pain medication as indicated, encourage patient to voice pain to staff; patient able to go to [facility] with RN    Patient is not progressing towards the following goals:      Problem: Pain - Standard  Goal: Alleviation of pain or a reduction in pain to the patient’s comfort goal  Outcome: Progressing     Problem: Communication  Goal: The ability to communicate needs accurately and effectively will improve  Outcome: Progressing
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
## 2021-06-16 NOTE — CARE PLAN
The patient is Stable - Low risk of patient condition declining or worsening    Shift Goals  Clinical Goals: airway clearance, psychosocial stability  Patient Goals: Rest and PO intake  Family Goals: Update on plan of care    Progress made toward(s) clinical / shift goals:  Pt able to manage secretionstiara at bedside. Pt in good spirits today, ambulating the halls with his mom    Patient is not progressing towards the following goals:

## 2021-06-16 NOTE — CARE PLAN
The patient is Stable - Low risk of patient condition declining or worsening    Shift Goals  Clinical Goals: pain control, monitor o2 sat  Patient Goals: go outside   Family Goals: Update on plan of care    Progress made toward(s) clinical / shift goals:  Pain under control throughout shift and patient sating above 90% on RA.      Problem: Pain - Standard  Goal: Alleviation of pain or a reduction in pain to the patient’s comfort goal  Outcome: Progressing       Patient is not progressing towards the following goals:

## 2021-06-17 LAB
BASOPHILS # BLD AUTO: 0.7 % (ref 0–1.8)
BASOPHILS # BLD: 0.08 K/UL (ref 0–0.12)
EOSINOPHIL # BLD AUTO: 0.24 K/UL (ref 0–0.51)
EOSINOPHIL NFR BLD: 2.2 % (ref 0–6.9)
ERYTHROCYTE [DISTWIDTH] IN BLOOD BY AUTOMATED COUNT: 48 FL (ref 35.9–50)
HCT VFR BLD AUTO: 46.1 % (ref 42–52)
HGB BLD-MCNC: 14.4 G/DL (ref 14–18)
IMM GRANULOCYTES # BLD AUTO: 0.05 K/UL (ref 0–0.11)
IMM GRANULOCYTES NFR BLD AUTO: 0.4 % (ref 0–0.9)
LYMPHOCYTES # BLD AUTO: 3.13 K/UL (ref 1–4.8)
LYMPHOCYTES NFR BLD: 28.1 % (ref 22–41)
MAGNESIUM SERPL-MCNC: 2.5 MG/DL (ref 1.5–2.5)
MCH RBC QN AUTO: 30.1 PG (ref 27–33)
MCHC RBC AUTO-ENTMCNC: 31.2 G/DL (ref 33.7–35.3)
MCV RBC AUTO: 96.2 FL (ref 81.4–97.8)
MONOCYTES # BLD AUTO: 1.29 K/UL (ref 0–0.85)
MONOCYTES NFR BLD AUTO: 11.6 % (ref 0–13.4)
NEUTROPHILS # BLD AUTO: 6.36 K/UL (ref 1.82–7.42)
NEUTROPHILS NFR BLD: 57 % (ref 44–72)
NRBC # BLD AUTO: 0 K/UL
NRBC BLD-RTO: 0 /100 WBC
PHOSPHATE SERPL-MCNC: 3.7 MG/DL (ref 2.5–4.5)
PLATELET # BLD AUTO: 348 K/UL (ref 164–446)
PMV BLD AUTO: 10.2 FL (ref 9–12.9)
RBC # BLD AUTO: 4.79 M/UL (ref 4.7–6.1)
WBC # BLD AUTO: 11.2 K/UL (ref 4.8–10.8)

## 2021-06-17 PROCEDURE — 84100 ASSAY OF PHOSPHORUS: CPT

## 2021-06-17 PROCEDURE — 85025 COMPLETE CBC W/AUTO DIFF WBC: CPT

## 2021-06-17 PROCEDURE — A9270 NON-COVERED ITEM OR SERVICE: HCPCS | Performed by: NURSE PRACTITIONER

## 2021-06-17 PROCEDURE — 83735 ASSAY OF MAGNESIUM: CPT

## 2021-06-17 PROCEDURE — 700102 HCHG RX REV CODE 250 W/ 637 OVERRIDE(OP): Performed by: SURGERY

## 2021-06-17 PROCEDURE — A9270 NON-COVERED ITEM OR SERVICE: HCPCS | Performed by: SURGERY

## 2021-06-17 PROCEDURE — 700102 HCHG RX REV CODE 250 W/ 637 OVERRIDE(OP): Performed by: NURSE PRACTITIONER

## 2021-06-17 PROCEDURE — 700111 HCHG RX REV CODE 636 W/ 250 OVERRIDE (IP): Performed by: SURGERY

## 2021-06-17 PROCEDURE — 36415 COLL VENOUS BLD VENIPUNCTURE: CPT

## 2021-06-17 PROCEDURE — 770001 HCHG ROOM/CARE - MED/SURG/GYN PRIV*

## 2021-06-17 RX ADMIN — AMOXICILLIN AND CLAVULANATE POTASSIUM 1 TABLET: 875; 125 TABLET, FILM COATED ORAL at 04:28

## 2021-06-17 RX ADMIN — DOCUSATE SODIUM 100 MG: 50 LIQUID ORAL at 04:28

## 2021-06-17 RX ADMIN — OXYCODONE HYDROCHLORIDE 10 MG: 10 TABLET ORAL at 04:28

## 2021-06-17 RX ADMIN — CHLORHEXIDINE GLUCONATE 0.12% ORAL RINSE 15 ML: 1.2 LIQUID ORAL at 17:27

## 2021-06-17 RX ADMIN — OXYCODONE HYDROCHLORIDE 10 MG: 10 TABLET ORAL at 20:09

## 2021-06-17 RX ADMIN — ENOXAPARIN SODIUM 40 MG: 40 INJECTION SUBCUTANEOUS at 04:28

## 2021-06-17 RX ADMIN — OXYCODONE 5 MG: 5 TABLET ORAL at 00:26

## 2021-06-17 RX ADMIN — MAGNESIUM HYDROXIDE 30 ML: 400 SUSPENSION ORAL at 04:28

## 2021-06-17 RX ADMIN — CHLORHEXIDINE GLUCONATE 0.12% ORAL RINSE 15 ML: 1.2 LIQUID ORAL at 04:28

## 2021-06-17 RX ADMIN — SULFAMETHOXAZOLE AND TRIMETHOPRIM 1 TABLET: 800; 160 TABLET ORAL at 04:28

## 2021-06-17 ASSESSMENT — ENCOUNTER SYMPTOMS
CONSTITUTIONAL NEGATIVE: 1
NECK PAIN: 0
GASTROINTESTINAL NEGATIVE: 1
ROS GI COMMENTS: 6/16
RESPIRATORY NEGATIVE: 1
NEUROLOGICAL NEGATIVE: 1

## 2021-06-17 ASSESSMENT — PAIN DESCRIPTION - PAIN TYPE
TYPE: ACUTE PAIN
TYPE: ACUTE PAIN;CHRONIC PAIN
TYPE: ACUTE PAIN
TYPE: SURGICAL PAIN;ACUTE PAIN
TYPE: SURGICAL PAIN;ACUTE PAIN
TYPE: ACUTE PAIN
TYPE: SURGICAL PAIN;ACUTE PAIN
TYPE: ACUTE PAIN
TYPE: ACUTE PAIN

## 2021-06-17 NOTE — WOUND TEAM
Wound consult received regarding patient inside of mouth. This wound RN in to see patient with bedside RN's Gus, and charge RN Laura. Unable to obtain picture due to minimal visibility, but able to visualize adhered eschar inside patient roof and base of mouth. Difficult for patient to swallow. Patient can feel eschar hanging inside of mouth. This wound RN unable to remove due to concern for airway obstruction if eschar dislodged improperly. This wound RN recommends surgical consult for removal. May be beneficial for oral or facial surgeon to visualize. Updated bedside and charge RN. No need for wound team follow up, please call or reconsult for questions or concerns.

## 2021-06-17 NOTE — PROGRESS NOTES
Received report from previous shift RN  Assessment complete.  A&O x 4. Patient calls appropriately.  Patient ambulates with standby assist.   Patient has 3/10 pain. Pain managed with prescribed medications.  Denies N&V. Tolerating goal feed rate of 60 ml/hr through g-tube. Sodium discussed with NP and orders placed for 200 mL free water flushes q4.  Surgical incisions are dry, intact and open to air. Providing frequent cleansing to remove old drainage. Notable mucus, scabbing, and eschar build up in patient's mouth, consulted wound and trauma NP about consulting surgeon.   + void, + flatus, - BM.  Patient denies SOB.  Patient ambulating in his room and in the halls.  Legal hold in place, Tele sitter monitoring continuously and frequent nursing rounds in place.  Review plan with of care with patient. Call light and personal belongings with in reach. Personal belongings at bedside. Ok per Psychiatry. All needs met at this time.
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
## 2021-06-17 NOTE — CARE PLAN
The patient is Stable - Low risk of patient condition declining or worsening    Shift Goals  Clinical Goals: balance electrolytes  Patient Goals: Rest and PO intake  Family Goals: Update on plan of care    Progress made toward(s) clinical / shift goals:  Increase free water flushed per APRN. Dietary following     Patient is not progressing towards the following goals:
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
## 2021-06-17 NOTE — PROGRESS NOTES
Bedside report received.  Assessment complete.  A&O x 4. Patient calls appropriately. Tele-sitter in room. Patient on legal hold for SI. Personal belongings permitted in room per Psych.  Patient up with no assist.   Patient has 0/10 pain.   Denies N&V. Tube feeding in place.  Sutures to left eye.  Patient's mouth with dark dried blood, scabbing and yellow mucus build-up. Patient unable to open mouth fully, unable to fully clean  Dressing to old trach site changed. Green drainage noted.  + void  Review plan with of care with patient. Call light and personal belongings with in reach. Hourly rounding in place. All needs met at this time.

## 2021-06-18 PROCEDURE — A9270 NON-COVERED ITEM OR SERVICE: HCPCS | Performed by: NURSE PRACTITIONER

## 2021-06-18 PROCEDURE — 90791 PSYCH DIAGNOSTIC EVALUATION: CPT | Performed by: SOCIAL WORKER

## 2021-06-18 PROCEDURE — 770001 HCHG ROOM/CARE - MED/SURG/GYN PRIV*

## 2021-06-18 PROCEDURE — A9270 NON-COVERED ITEM OR SERVICE: HCPCS | Performed by: SURGERY

## 2021-06-18 PROCEDURE — 700102 HCHG RX REV CODE 250 W/ 637 OVERRIDE(OP): Performed by: SURGERY

## 2021-06-18 PROCEDURE — 92526 ORAL FUNCTION THERAPY: CPT

## 2021-06-18 PROCEDURE — 700111 HCHG RX REV CODE 636 W/ 250 OVERRIDE (IP): Performed by: SURGERY

## 2021-06-18 PROCEDURE — 700102 HCHG RX REV CODE 250 W/ 637 OVERRIDE(OP): Performed by: NURSE PRACTITIONER

## 2021-06-18 RX ADMIN — OXYCODONE 5 MG: 5 TABLET ORAL at 18:00

## 2021-06-18 RX ADMIN — OXYCODONE HYDROCHLORIDE 10 MG: 10 TABLET ORAL at 05:44

## 2021-06-18 RX ADMIN — OXYCODONE 5 MG: 5 TABLET ORAL at 13:06

## 2021-06-18 RX ADMIN — OXYCODONE HYDROCHLORIDE 10 MG: 10 TABLET ORAL at 01:03

## 2021-06-18 RX ADMIN — CHLORHEXIDINE GLUCONATE 0.12% ORAL RINSE 15 ML: 1.2 LIQUID ORAL at 05:44

## 2021-06-18 RX ADMIN — DOCUSATE SODIUM 50 MG AND SENNOSIDES 8.6 MG 1 TABLET: 8.6; 5 TABLET, FILM COATED ORAL at 20:38

## 2021-06-18 RX ADMIN — ENOXAPARIN SODIUM 40 MG: 40 INJECTION SUBCUTANEOUS at 06:00

## 2021-06-18 RX ADMIN — CHLORHEXIDINE GLUCONATE 0.12% ORAL RINSE 15 ML: 1.2 LIQUID ORAL at 17:56

## 2021-06-18 ASSESSMENT — PAIN DESCRIPTION - PAIN TYPE
TYPE: ACUTE PAIN
TYPE: ACUTE PAIN;SURGICAL PAIN
TYPE: SURGICAL PAIN
TYPE: ACUTE PAIN
TYPE: ACUTE PAIN

## 2021-06-18 ASSESSMENT — ENCOUNTER SYMPTOMS
RESPIRATORY NEGATIVE: 1
GASTROINTESTINAL NEGATIVE: 1
CONSTITUTIONAL NEGATIVE: 1
ROS GI COMMENTS: 6/17
NEUROLOGICAL NEGATIVE: 1
NECK PAIN: 0

## 2021-06-18 NOTE — PROGRESS NOTES
Bedside report received.  Assessment complete.  A&O x 4. Patient calls appropriately.  Patient ambulates around room independently and with staff in the halls.  Patient denies pain. Pain managed with prescribed medications, mobilization, and distraction.  Denies N&V. Tolerating new bolus feeding and free water flush diet. Night RN provided education on self feeds and hydrating. Patient eager to learn and perform activities. This interim nurse and Radha RN verified abilities and clarified any questions and concerns.  Surgical incisions to jaw and face CDI with old drainage. Attempting to cleanse with soap and water throughout the shift. Additional warm hydrogen peroxide mouth rinses are being performed in an attempt to clear some debris from the patient's mouth.  + void, + flatus, - BM.  Patient denies SOB. Unable to perform IS bc he is unable to close mouth around mouthpiece. Unable to effectively cough and deep breathe. Old trach site assessed. Small amounts of air still able to escape, so new dressing placed.   Patient's legal hold in place. Tele sitter in the room. Personal belongings and visitors are allowed per Psych orders.  Review plan with of care with patient. Call light and personal belongings with in reach. Hourly rounding in place. All needs met at this time.

## 2021-06-18 NOTE — CARE PLAN
The patient is Stable - Low risk of patient condition declining or worsening    Shift Goals  Clinical Goals: pt will demonstrate bolus feeding  Patient Goals: rest and intake  Family Goals: na    Progress made toward(s) clinical / shift goals:  pt was educated on completing bolus feedings self. Pt was able to demonstrate self feedings. Pt was medicated per MAR and is currently resting quietly.     Patient is not progressing towards the following goals:      Problem: Communication  Goal: The ability to communicate needs accurately and effectively will improve  Outcome: Not Progressing   Pt using words and writing.

## 2021-06-18 NOTE — DISCHARGE PLANNING
Received Order in Response to Request for Court Ordered Involuntary Admission from the court continuing pt until 6/24 at 0900. Sent copy to [facility], scanned copy into pt's chart.

## 2021-06-18 NOTE — DIETARY
"Nutrition Services: Update   Pt was adjusted to bolus feeds 6/17 - thus far tolerating well per RN and pt.  Pt administers bolus feeds and free water flushes himself.  He denies issues or any form of GI distress @ this time.  RD additionally reviewed hand hygiene / tube care / troubleshooting in general w/ pt - also provided a booklet and extra tube supplies.      Per RD note from 6/17,  \"Weight 80.3 kg via stand up Scale on 6/16. Overall weight has trended down, all previous weights were taken via bed scale, which often reflects an elevated weight. Some Wt loss is expected during lengthy hospital stay. I/O currently +20.7 L since admit, however, it is noted that output is not consistently documented.   Admit wt 98.9 kg via bed scale - potential 18.8% wt loss x3.5 weeks.\"  Per RD encounter today, pt appears adequately nourished with some potential mild/moderate muscle loss to temple region.      Will continue to monitor nutritional status and wt trends.  D/w pt and RN to let RD(s) know if pt starts to experience s/s of TF intolerance or is unable to tolerate 6 containers/day.    "
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
## 2021-06-18 NOTE — THERAPY
Speech Language Pathology  Daily Treatment     Patient Name: Greg Tovar  Age:  51 y.o., Sex:  male  Medical Record #: [id number]  Today's Date: 6/18/2021     Precautions: Fall Risk, Swallow Precautions ( See Comments)  Comments: SI risk with telesitter    Assessment    Patient was seen on this date for dysphagia treatment. Now decannulated and on room air. Mother at bedside for session. Patient awake and mobilized self independently up in bed and around room. Speech severely dysarthric. Limited view of oral cavity due to significantly reduced mandibular ROM; however, severe xerostomia appreciated with thick dry green and black secretions in oral cavity. Extensive oral care and rinsing provided prior to PO intake with minimal removal of dry secretions. PO consisted of a few single ice chips at a time with use of oral suction to clear oral residue. Again, multiple rinses were performed between a few trials of ice chips. Patient mobilized to sink to use large syringe to continue rinses x4-5. Nasal regurgitation occurred x1 concerning for adequate palatal seal. Patient was cued to produce strong cough in which patient partially expectorated large (3 inch long, 2 inch wide, and 1/2 inch thick), dark-colored, dry blood/secretions/tissue  that required manual extraction by this SLP. View of soft palate and intelligibility of speech improved thereafter. After 2-3 more oral rinses, resumed PO trials. Patient had inconsistent weak cough response to ~12 single ice chips (total in session) and 1/2 tsp MTL x3 which is concerning for aspiration. Patient required mod-max verbal cues to bring lips together to promote adequate oral seal and minimal verbal cues for multiple effortful swallows with each bolus. Anterior bolus loss, mild sputtering of MTL, and oral residue requiring oral suction to clear bolus concerning for poor bolus control. Patient performed falsetto exercise with good accuracy. Education provided to patient and mother  regarding current status and SLP recs. Written dysphagia exercises provided.     Plan    Recommend continue NPO/PEG. Please perform aggressive oral care throughout the day. OK to initiate 3-5 SMALL, single ice chips following oral care given 1:1 supervision by nursing staff. SLP following for dysphagia therapy.     Continue current treatment plan.    Discharge Recommendations: Recommend post-acute placement for additional speech therapy services prior to discharge home     Objective       06/18/21 1428   Vitals   O2 Delivery Device None - Room Air   Written Expression   Dominant Hand Right   Dysphagia    Positioning / Behavior Modification Modulate Rate or Bite Size;Multiple Swallows;Effortful Swallow;Cough / Clear after Swallow;Self Monitoring   Other Treatments Pre-feeding trials    Diet / Liquid Recommendation NPO;Pre-Feeding Trials with SLP Only   Nutritional Liquid Intake Rating Scale Nothing by mouth   Nutritional Food Intake Rating Scale Tube dependent with minimal attempts of oral intake   Nursing Communication Swallow Precaution Sign Posted at Head of Bed   Skilled Intervention Compensatory Strategies;Verbal Cueing   Recommended Route of Medication Administration   Medication Administration  Via Gastric Tube   Short Term Goals   Short Term Goal # 2 Pt will consume prefeeding trials with no overt s/sx of aspiration   Goal Outcome # 2  Progressing slower than expected
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
## 2021-06-19 PROCEDURE — 700102 HCHG RX REV CODE 250 W/ 637 OVERRIDE(OP): Performed by: SURGERY

## 2021-06-19 PROCEDURE — A9270 NON-COVERED ITEM OR SERVICE: HCPCS | Performed by: NURSE PRACTITIONER

## 2021-06-19 PROCEDURE — 700111 HCHG RX REV CODE 636 W/ 250 OVERRIDE (IP): Performed by: SURGERY

## 2021-06-19 PROCEDURE — 770001 HCHG ROOM/CARE - MED/SURG/GYN PRIV*

## 2021-06-19 PROCEDURE — A9270 NON-COVERED ITEM OR SERVICE: HCPCS | Performed by: SURGERY

## 2021-06-19 PROCEDURE — 90837 PSYTX W PT 60 MINUTES: CPT | Performed by: PSYCHOLOGIST

## 2021-06-19 PROCEDURE — 700102 HCHG RX REV CODE 250 W/ 637 OVERRIDE(OP): Performed by: NURSE PRACTITIONER

## 2021-06-19 RX ADMIN — OXYCODONE 5 MG: 5 TABLET ORAL at 15:12

## 2021-06-19 RX ADMIN — OXYCODONE 5 MG: 5 TABLET ORAL at 06:02

## 2021-06-19 RX ADMIN — OXYCODONE 5 MG: 5 TABLET ORAL at 10:56

## 2021-06-19 RX ADMIN — OXYCODONE 5 MG: 5 TABLET ORAL at 00:52

## 2021-06-19 RX ADMIN — DOCUSATE SODIUM 50 MG AND SENNOSIDES 8.6 MG 1 TABLET: 8.6; 5 TABLET, FILM COATED ORAL at 21:00

## 2021-06-19 RX ADMIN — ENOXAPARIN SODIUM 40 MG: 40 INJECTION SUBCUTANEOUS at 06:02

## 2021-06-19 RX ADMIN — CHLORHEXIDINE GLUCONATE 0.12% ORAL RINSE 15 ML: 1.2 LIQUID ORAL at 06:02

## 2021-06-19 RX ADMIN — OXYCODONE 5 MG: 5 TABLET ORAL at 20:29

## 2021-06-19 RX ADMIN — CHLORHEXIDINE GLUCONATE 0.12% ORAL RINSE 15 ML: 1.2 LIQUID ORAL at 20:29

## 2021-06-19 ASSESSMENT — PAIN DESCRIPTION - PAIN TYPE
TYPE: ACUTE PAIN

## 2021-06-19 ASSESSMENT — ENCOUNTER SYMPTOMS
RESPIRATORY NEGATIVE: 1
GASTROINTESTINAL NEGATIVE: 1
NEUROLOGICAL NEGATIVE: 1
NECK PAIN: 0
CONSTITUTIONAL NEGATIVE: 1
ROS GI COMMENTS: 6/17

## 2021-06-19 NOTE — CARE PLAN
The patient is Stable - Low risk of patient condition declining or worsening    Shift Goals  Clinical Goals: tolerate tube feed  Patient Goals: rest  Family Goals: na    Progress made toward(s) clinical / shift goals: Independent in room, pain well controlled.       Problem: Pain - Standard  Goal: Alleviation of pain or a reduction in pain to the patient’s comfort goal  Outcome: Progressing     Problem: Mobility  Goal: Patient's capacity to carry out activities will improve  Outcome: Progressing

## 2021-06-19 NOTE — CARE PLAN
Problem: Pain - Standard  Goal: Alleviation of pain or a reduction in pain to the patient’s comfort goal  Outcome: Progressing     Problem: Psychosocial  Goal: Patient's ability to identify and develop effective coping behaviors will improve  Outcome: Progressing  Goal: Patient's ability to identify and utilize available support systems will improve  Outcome: Progressing     Problem: Pain - Standard  Goal: Alleviation of pain or a reduction in pain to the patient’s comfort goal  Outcome: Progressing     Problem: Psychosocial  Goal: Patient's level of anxiety will decrease  Outcome: Progressing  Goal: Patient's ability to verbalize feelings about condition will improve  Outcome: Progressing  Goal: Patient's ability to re-evaluate and adapt role responsibilities will improve  Outcome: Progressing  Goal: Patient and family will demonstrate ability to cope with life altering diagnosis and/or procedure  Outcome: Progressing  Goal: Spiritual and cultural needs incorporated into hospitalization  Outcome: Progressing     Problem: Communication  Goal: The ability to communicate needs accurately and effectively will improve  Outcome: Progressing     Problem: Respiratory  Goal: Patient will achieve/maintain optimum respiratory ventilation and gas exchange  Outcome: Progressing     Problem: Mobility  Goal: Patient's capacity to carry out activities will improve  Outcome: Progressing     Problem: Depression  Goal: Patient and family/caregiver will verbalize accurate information about at least two of the possible causes of depression, three-four of the signs and symptoms of depression  Outcome: Progressing     The patient is Stable - Low risk of patient condition declining or worsening    Shift Goals  Clinical Goals: tolerate tube feed  Patient Goals: rest  Family Goals: na    Progress made toward(s) clinical / shift goals:  Patient ambulating comfortably, able to voice needs, low anxiety, visiting calmly with family.
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
## 2021-06-19 NOTE — PROGRESS NOTES
Report received, pt assessed. A+Ox4, VSS, reports pain well controlled at this time. Dressing in place to healing trach site. G- tube in place C/D/I- pt able to feed self independently. Tele sitter in place.   POC reviewed with pt, denies additional needs at this time, safety maintained.

## 2021-06-19 NOTE — CARE PLAN
The patient is Stable - Low risk of patient condition declining or worsening    Shift Goals  Clinical Goals: Improve nutritional status. Electrolyte balance  Patient Goals: rest and intake  Family Goals: na    Progress made toward(s) clinical / shift goals:  Pt tolerated three bolus feeds today. 200mL free water flushed q4. Pt is participating with care    Patient is not progressing towards the following goals:

## 2021-06-20 PROCEDURE — 700111 HCHG RX REV CODE 636 W/ 250 OVERRIDE (IP): Performed by: SURGERY

## 2021-06-20 PROCEDURE — A9270 NON-COVERED ITEM OR SERVICE: HCPCS | Performed by: NURSE PRACTITIONER

## 2021-06-20 PROCEDURE — 770001 HCHG ROOM/CARE - MED/SURG/GYN PRIV*

## 2021-06-20 PROCEDURE — A9270 NON-COVERED ITEM OR SERVICE: HCPCS | Performed by: SURGERY

## 2021-06-20 PROCEDURE — 700102 HCHG RX REV CODE 250 W/ 637 OVERRIDE(OP): Performed by: SURGERY

## 2021-06-20 PROCEDURE — 700102 HCHG RX REV CODE 250 W/ 637 OVERRIDE(OP): Performed by: NURSE PRACTITIONER

## 2021-06-20 RX ADMIN — OXYCODONE HYDROCHLORIDE 10 MG: 10 TABLET ORAL at 21:45

## 2021-06-20 RX ADMIN — CHLORHEXIDINE GLUCONATE 0.12% ORAL RINSE 15 ML: 1.2 LIQUID ORAL at 05:44

## 2021-06-20 RX ADMIN — DOCUSATE SODIUM 100 MG: 50 LIQUID ORAL at 05:44

## 2021-06-20 RX ADMIN — MAGNESIUM HYDROXIDE 30 ML: 400 SUSPENSION ORAL at 05:44

## 2021-06-20 RX ADMIN — CHLORHEXIDINE GLUCONATE 0.12% ORAL RINSE 15 ML: 1.2 LIQUID ORAL at 17:01

## 2021-06-20 RX ADMIN — ENOXAPARIN SODIUM 40 MG: 40 INJECTION SUBCUTANEOUS at 05:44

## 2021-06-20 ASSESSMENT — ENCOUNTER SYMPTOMS
ROS GI COMMENTS: 6/17
CONSTITUTIONAL NEGATIVE: 1
NECK PAIN: 0
GASTROINTESTINAL NEGATIVE: 1
NEUROLOGICAL NEGATIVE: 1
RESPIRATORY NEGATIVE: 1

## 2021-06-20 ASSESSMENT — PAIN DESCRIPTION - PAIN TYPE
TYPE: ACUTE PAIN

## 2021-06-20 NOTE — CARE PLAN
Problem: Pain - Standard  Goal: Alleviation of pain or a reduction in pain to the patient’s comfort goal  Outcome: Progressing     Problem: Psychosocial  Goal: Patient's ability to identify and develop effective coping behaviors will improve  Outcome: Progressing  Goal: Patient's ability to identify and utilize available support systems will improve  Outcome: Progressing     Problem: Pain - Standard  Goal: Alleviation of pain or a reduction in pain to the patient’s comfort goal  Outcome: Progressing     Problem: Psychosocial  Goal: Patient's level of anxiety will decrease  Outcome: Progressing  Goal: Patient's ability to verbalize feelings about condition will improve  Outcome: Progressing  Goal: Patient's ability to re-evaluate and adapt role responsibilities will improve  Outcome: Progressing  Goal: Patient and family will demonstrate ability to cope with life altering diagnosis and/or procedure  Outcome: Progressing  Goal: Spiritual and cultural needs incorporated into hospitalization  Outcome: Progressing     Problem: Communication  Goal: The ability to communicate needs accurately and effectively will improve  Outcome: Progressing     Problem: Respiratory  Goal: Patient will achieve/maintain optimum respiratory ventilation and gas exchange  Outcome: Progressing     Problem: Mobility  Goal: Patient's capacity to carry out activities will improve  Outcome: Progressing     Problem: Depression  Goal: Patient and family/caregiver will verbalize accurate information about at least two of the possible causes of depression, three-four of the signs and symptoms of depression  Outcome: Progressing   The patient is Stable - Low risk of patient condition declining or worsening    Shift Goals  Clinical Goals: independent with tube feeds  Patient Goals: independent with tube feeds  Family Goals: na    Progress made toward(s) clinical / shift goals:  yes    Patient is not progressing towards the following goals:
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
## 2021-06-20 NOTE — PROGRESS NOTES
Report received, pt assessed. A+Ox4, VSS, reports pain well controlled at this time. Dressing in place to healing trach site. G- tube in place C/D/I- Isosource at bedside, pt able to feed self independently. Tele sitter in place.   POC reviewed with pt, denies additional needs at this time, safety maintained.

## 2021-06-20 NOTE — CARE PLAN
The patient is Stable - Low risk of patient condition declining or worsening    Shift Goals  Clinical Goals: tolerate tube feed  Patient Goals: rest  Family Goals: na    Progress made toward(s) clinical / shift goals: Pain well controlled, pt independent in the room and with cares, trach site healing      Problem: Pain - Standard  Goal: Alleviation of pain or a reduction in pain to the patient’s comfort goal  Outcome: Progressing     Problem: Respiratory  Goal: Patient will achieve/maintain optimum respiratory ventilation and gas exchange  Outcome: Progressing     Problem: Mobility  Goal: Patient's capacity to carry out activities will improve  Outcome: Progressing

## 2021-06-20 NOTE — PROGRESS NOTES
Report received and assumed care of patient. Patient sleeping with no distress noted. Telesitter in place. Call bell in reach and safety measures in place.
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
## 2021-06-21 LAB
BASOPHILS # BLD AUTO: 0.4 % (ref 0–1.8)
BASOPHILS # BLD: 0.03 K/UL (ref 0–0.12)
CRP SERPL HS-MCNC: <0.3 MG/DL (ref 0–0.75)
EOSINOPHIL # BLD AUTO: 0.18 K/UL (ref 0–0.51)
EOSINOPHIL NFR BLD: 2.4 % (ref 0–6.9)
ERYTHROCYTE [DISTWIDTH] IN BLOOD BY AUTOMATED COUNT: 47.1 FL (ref 35.9–50)
HCT VFR BLD AUTO: 40.6 % (ref 42–52)
HGB BLD-MCNC: 12.9 G/DL (ref 14–18)
IMM GRANULOCYTES # BLD AUTO: 0.03 K/UL (ref 0–0.11)
IMM GRANULOCYTES NFR BLD AUTO: 0.4 % (ref 0–0.9)
LYMPHOCYTES # BLD AUTO: 2.68 K/UL (ref 1–4.8)
LYMPHOCYTES NFR BLD: 35.2 % (ref 22–41)
MAGNESIUM SERPL-MCNC: 2.4 MG/DL (ref 1.5–2.5)
MCH RBC QN AUTO: 29.9 PG (ref 27–33)
MCHC RBC AUTO-ENTMCNC: 31.8 G/DL (ref 33.7–35.3)
MCV RBC AUTO: 94.2 FL (ref 81.4–97.8)
MONOCYTES # BLD AUTO: 0.83 K/UL (ref 0–0.85)
MONOCYTES NFR BLD AUTO: 10.9 % (ref 0–13.4)
NEUTROPHILS # BLD AUTO: 3.87 K/UL (ref 1.82–7.42)
NEUTROPHILS NFR BLD: 50.7 % (ref 44–72)
NRBC # BLD AUTO: 0 K/UL
NRBC BLD-RTO: 0 /100 WBC
PHOSPHATE SERPL-MCNC: 3.2 MG/DL (ref 2.5–4.5)
PLATELET # BLD AUTO: 222 K/UL (ref 164–446)
PMV BLD AUTO: 11 FL (ref 9–12.9)
PREALB SERPL-MCNC: 31.2 MG/DL (ref 18–38)
RBC # BLD AUTO: 4.31 M/UL (ref 4.7–6.1)
WBC # BLD AUTO: 7.6 K/UL (ref 4.8–10.8)

## 2021-06-21 PROCEDURE — 700102 HCHG RX REV CODE 250 W/ 637 OVERRIDE(OP): Performed by: SURGERY

## 2021-06-21 PROCEDURE — 84100 ASSAY OF PHOSPHORUS: CPT

## 2021-06-21 PROCEDURE — 85025 COMPLETE CBC W/AUTO DIFF WBC: CPT

## 2021-06-21 PROCEDURE — A9270 NON-COVERED ITEM OR SERVICE: HCPCS | Performed by: NURSE PRACTITIONER

## 2021-06-21 PROCEDURE — 84134 ASSAY OF PREALBUMIN: CPT

## 2021-06-21 PROCEDURE — 36415 COLL VENOUS BLD VENIPUNCTURE: CPT

## 2021-06-21 PROCEDURE — A9270 NON-COVERED ITEM OR SERVICE: HCPCS | Performed by: SURGERY

## 2021-06-21 PROCEDURE — 700111 HCHG RX REV CODE 636 W/ 250 OVERRIDE (IP): Performed by: SURGERY

## 2021-06-21 PROCEDURE — 770001 HCHG ROOM/CARE - MED/SURG/GYN PRIV*

## 2021-06-21 PROCEDURE — 86140 C-REACTIVE PROTEIN: CPT

## 2021-06-21 PROCEDURE — 83735 ASSAY OF MAGNESIUM: CPT

## 2021-06-21 PROCEDURE — 700102 HCHG RX REV CODE 250 W/ 637 OVERRIDE(OP): Performed by: NURSE PRACTITIONER

## 2021-06-21 RX ADMIN — DOCUSATE SODIUM 100 MG: 50 LIQUID ORAL at 05:22

## 2021-06-21 RX ADMIN — POLYETHYLENE GLYCOL 3350 1 PACKET: 17 POWDER, FOR SOLUTION ORAL at 17:18

## 2021-06-21 RX ADMIN — OXYCODONE HYDROCHLORIDE 10 MG: 10 TABLET ORAL at 16:15

## 2021-06-21 RX ADMIN — CHLORHEXIDINE GLUCONATE 0.12% ORAL RINSE 15 ML: 1.2 LIQUID ORAL at 05:22

## 2021-06-21 RX ADMIN — DOCUSATE SODIUM 100 MG: 50 LIQUID ORAL at 17:18

## 2021-06-21 RX ADMIN — ENOXAPARIN SODIUM 40 MG: 40 INJECTION SUBCUTANEOUS at 05:22

## 2021-06-21 RX ADMIN — MAGNESIUM HYDROXIDE 30 ML: 400 SUSPENSION ORAL at 05:22

## 2021-06-21 RX ADMIN — CHLORHEXIDINE GLUCONATE 0.12% ORAL RINSE 15 ML: 1.2 LIQUID ORAL at 17:18

## 2021-06-21 ASSESSMENT — ENCOUNTER SYMPTOMS
CONSTITUTIONAL NEGATIVE: 1
GASTROINTESTINAL NEGATIVE: 1
ROS GI COMMENTS: BM 6/20
NEUROLOGICAL NEGATIVE: 1
RESPIRATORY NEGATIVE: 1
NECK PAIN: 0

## 2021-06-21 ASSESSMENT — PAIN DESCRIPTION - PAIN TYPE
TYPE: ACUTE PAIN

## 2021-06-21 NOTE — CARE PLAN
The patient is Stable - Low risk of patient condition declining or worsening    Shift Goals  Clinical Goals: rest  Patient Goals: rest  Family Goals: na      Problem: Pain - Standard  Goal: Alleviation of pain or a reduction in pain to the patient’s comfort goal  Outcome: Progressing     Problem: Psychosocial  Goal: Patient's level of anxiety will decrease  Outcome: Progressing  Goal: Patient's ability to verbalize feelings about condition will improve  Outcome: Progressing     Problem: Communication  Goal: The ability to communicate needs accurately and effectively will improve  Outcome: Progressing

## 2021-06-21 NOTE — PROGRESS NOTES
Report received, pt assessed. A+Ox4, VSS, Oxycodone given for pain. Dressing in place to healing trach site. G- tube in place C/D/I- Isosource at bedside, pt able to feed self independently. Tele sitter in place.   POC reviewed with pt, denies additional needs at this time, safety maintained.

## 2021-06-21 NOTE — CARE PLAN
The patient is Stable - Low risk of patient condition declining or worsening    Shift Goals  Clinical Goals: ambulate, goal self tube feeds  Patient Goals: goal self tube feeds, ambulate  Family Goals: na    Progress made toward(s) clinical / shift goals:      Problem: Pain - Standard  Goal: Alleviation of pain or a reduction in pain to the patient’s comfort goal  Outcome: Progressing   Pain controlled with oral pain meds  Problem: Psychosocial  Goal: Patient's ability to identify and develop effective coping behaviors will improve  Outcome: Progressing   Pt encouraged to verbalize concerns.   Problem: Psychosocial  Goal: Patient's level of anxiety will decrease  Outcome: Progressing     Patient is not progressing towards the following goals:
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
## 2021-06-21 NOTE — PROGRESS NOTES
"Pt AA&Ox4. Up to the bathroom giving self feeds via G tube. Demonstrates appropriate feeding through G tube. Pain rating at 4. Denies pain intervention at this time. Dressing intact to healing trach site. Plan of care discussed. Hourly rounding in place. telesitter in room. /57   Pulse 70   Temp 36.7 °C (98 °F) (Temporal)   Resp 17   Ht 1.803 m (5' 11\")   Wt 80.3 kg (177 lb 0.5 oz)   SpO2 95%     "

## 2021-06-22 ENCOUNTER — APPOINTMENT (OUTPATIENT)
Dept: RADIOLOGY | Facility: MEDICAL CENTER | Age: 51
DRG: 003 | End: 2021-06-22
Attending: SURGERY
Payer: MEDICAID

## 2021-06-22 PROCEDURE — 770001 HCHG ROOM/CARE - MED/SURG/GYN PRIV*

## 2021-06-22 PROCEDURE — A9270 NON-COVERED ITEM OR SERVICE: HCPCS | Performed by: SURGERY

## 2021-06-22 PROCEDURE — 70486 CT MAXILLOFACIAL W/O DYE: CPT

## 2021-06-22 PROCEDURE — 700102 HCHG RX REV CODE 250 W/ 637 OVERRIDE(OP): Performed by: NURSE PRACTITIONER

## 2021-06-22 PROCEDURE — A9270 NON-COVERED ITEM OR SERVICE: HCPCS | Performed by: NURSE PRACTITIONER

## 2021-06-22 PROCEDURE — 700102 HCHG RX REV CODE 250 W/ 637 OVERRIDE(OP): Performed by: SURGERY

## 2021-06-22 PROCEDURE — 90791 PSYCH DIAGNOSTIC EVALUATION: CPT | Performed by: SOCIAL WORKER

## 2021-06-22 PROCEDURE — 700111 HCHG RX REV CODE 636 W/ 250 OVERRIDE (IP): Performed by: SURGERY

## 2021-06-22 RX ADMIN — CHLORHEXIDINE GLUCONATE 0.12% ORAL RINSE 15 ML: 1.2 LIQUID ORAL at 05:24

## 2021-06-22 RX ADMIN — MAGNESIUM HYDROXIDE 30 ML: 400 SUSPENSION ORAL at 05:24

## 2021-06-22 RX ADMIN — OXYCODONE 5 MG: 5 TABLET ORAL at 05:24

## 2021-06-22 RX ADMIN — ENOXAPARIN SODIUM 40 MG: 40 INJECTION SUBCUTANEOUS at 05:24

## 2021-06-22 RX ADMIN — POLYETHYLENE GLYCOL 3350 1 PACKET: 17 POWDER, FOR SOLUTION ORAL at 05:24

## 2021-06-22 RX ADMIN — DOCUSATE SODIUM 100 MG: 50 LIQUID ORAL at 05:24

## 2021-06-22 RX ADMIN — OXYCODONE 5 MG: 5 TABLET ORAL at 13:57

## 2021-06-22 RX ADMIN — CHLORHEXIDINE GLUCONATE 0.12% ORAL RINSE 15 ML: 1.2 LIQUID ORAL at 17:08

## 2021-06-22 ASSESSMENT — PAIN DESCRIPTION - PAIN TYPE
TYPE: ACUTE PAIN

## 2021-06-22 ASSESSMENT — ENCOUNTER SYMPTOMS
RESPIRATORY NEGATIVE: 1
NEUROLOGICAL NEGATIVE: 1
ROS GI COMMENTS: BM 6/20
CONSTITUTIONAL NEGATIVE: 1
NECK PAIN: 0
GASTROINTESTINAL NEGATIVE: 1

## 2021-06-22 NOTE — CARE PLAN
Problem: Pain - Standard  Goal: Alleviation of pain or a reduction in pain to the patient’s comfort goal  Outcome: Progressing     Problem: Psychosocial  Goal: Patient's ability to identify and develop effective coping behaviors will improve  Outcome: Progressing     Problem: Psychosocial  Goal: Patient's level of anxiety will decrease  Outcome: Progressing   The patient is Stable - Low risk of patient condition declining or worsening    Shift Goals  Clinical Goals: self bolus feed, pain control  Patient Goals: ambulate  Family Goals: na    Progress made toward(s) clinical / shift goals:  pain controlled, mom at bedside, ambulating in baez with family, cooperative with care,     Patient is not progressing towards the following goals:

## 2021-06-22 NOTE — CARE PLAN
The patient is Stable - Low risk of patient condition declining or worsening    Shift Goals  Clinical Goals: ambulate, goal self tube feeds  Patient Goals: goal self tube feeds, ambulate  Family Goals: na      Problem: Pain - Standard  Goal: Alleviation of pain or a reduction in pain to the patient’s comfort goal  Outcome: Progressing     Problem: Pain - Standard  Goal: Alleviation of pain or a reduction in pain to the patient’s comfort goal  Outcome: Progressing     Problem: Psychosocial  Goal: Patient's level of anxiety will decrease  Outcome: Progressing  Goal: Patient's ability to verbalize feelings about condition will improve  Outcome: Progressing
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
## 2021-06-23 ENCOUNTER — APPOINTMENT (OUTPATIENT)
Dept: RADIOLOGY | Facility: MEDICAL CENTER | Age: 51
DRG: 003 | End: 2021-06-23
Attending: NURSE PRACTITIONER
Payer: MEDICAID

## 2021-06-23 PROBLEM — Z02.9 DISCHARGE PLANNING ISSUES: Status: ACTIVE | Noted: 2021-06-23

## 2021-06-23 PROBLEM — Z75.8 DISCHARGE PLANNING ISSUES: Status: RESOLVED | Noted: 2021-06-23 | Resolved: 2021-06-23

## 2021-06-23 PROBLEM — Z75.8 DISCHARGE PLANNING ISSUES: Status: ACTIVE | Noted: 2021-06-23

## 2021-06-23 PROBLEM — Z02.9 DISCHARGE PLANNING ISSUES: Status: RESOLVED | Noted: 2021-06-23 | Resolved: 2021-06-23

## 2021-06-23 PROCEDURE — 92526 ORAL FUNCTION THERAPY: CPT

## 2021-06-23 PROCEDURE — 90732 PPSV23 VACC 2 YRS+ SUBQ/IM: CPT | Performed by: SPECIALIST

## 2021-06-23 PROCEDURE — 700102 HCHG RX REV CODE 250 W/ 637 OVERRIDE(OP): Performed by: NURSE PRACTITIONER

## 2021-06-23 PROCEDURE — 700102 HCHG RX REV CODE 250 W/ 637 OVERRIDE(OP): Performed by: SURGERY

## 2021-06-23 PROCEDURE — 3E0234Z INTRODUCTION OF SERUM, TOXOID AND VACCINE INTO MUSCLE, PERCUTANEOUS APPROACH: ICD-10-PCS | Performed by: SURGERY

## 2021-06-23 PROCEDURE — 700111 HCHG RX REV CODE 636 W/ 250 OVERRIDE (IP): Performed by: SURGERY

## 2021-06-23 PROCEDURE — A9270 NON-COVERED ITEM OR SERVICE: HCPCS | Performed by: SPECIALIST

## 2021-06-23 PROCEDURE — 700102 HCHG RX REV CODE 250 W/ 637 OVERRIDE(OP): Performed by: SPECIALIST

## 2021-06-23 PROCEDURE — A9270 NON-COVERED ITEM OR SERVICE: HCPCS | Performed by: SURGERY

## 2021-06-23 PROCEDURE — A9270 NON-COVERED ITEM OR SERVICE: HCPCS | Performed by: NURSE PRACTITIONER

## 2021-06-23 PROCEDURE — 700111 HCHG RX REV CODE 636 W/ 250 OVERRIDE (IP): Performed by: SPECIALIST

## 2021-06-23 PROCEDURE — 770001 HCHG ROOM/CARE - MED/SURG/GYN PRIV*

## 2021-06-23 PROCEDURE — 71045 X-RAY EXAM CHEST 1 VIEW: CPT

## 2021-06-23 PROCEDURE — 90471 IMMUNIZATION ADMIN: CPT

## 2021-06-23 RX ADMIN — ENOXAPARIN SODIUM 40 MG: 40 INJECTION SUBCUTANEOUS at 06:16

## 2021-06-23 RX ADMIN — OXYCODONE 5 MG: 5 TABLET ORAL at 22:03

## 2021-06-23 RX ADMIN — CHLORHEXIDINE GLUCONATE 0.12% ORAL RINSE 15 ML: 1.2 LIQUID ORAL at 17:48

## 2021-06-23 RX ADMIN — PNEUMOCOCCAL VACCINE POLYVALENT 25 MCG
25; 25; 25; 25; 25; 25; 25; 25; 25; 25; 25; 25; 25; 25; 25; 25; 25; 25; 25; 25; 25; 25; 25 INJECTION, SOLUTION INTRAMUSCULAR; SUBCUTANEOUS at 17:48

## 2021-06-23 RX ADMIN — CHLORHEXIDINE GLUCONATE 0.12% ORAL RINSE 15 ML: 1.2 LIQUID ORAL at 06:16

## 2021-06-23 RX ADMIN — OXYCODONE 5 MG: 5 TABLET ORAL at 06:27

## 2021-06-23 ASSESSMENT — ENCOUNTER SYMPTOMS
CHILLS: 0
DIZZINESS: 0
VOMITING: 0
NAUSEA: 0
BLURRED VISION: 0
COUGH: 0
DOUBLE VISION: 1
ROS GI COMMENTS: LAST BM 6/23
MYALGIAS: 0
NERVOUS/ANXIOUS: 0
NECK PAIN: 0
HEADACHES: 0
PALPITATIONS: 0
BACK PAIN: 0
FEVER: 0
HEMOPTYSIS: 0
HEARTBURN: 0

## 2021-06-23 ASSESSMENT — PAIN DESCRIPTION - PAIN TYPE
TYPE: ACUTE PAIN

## 2021-06-23 NOTE — PROGRESS NOTES
Pharmacy Pharmacotherapy Consult for LOS >30 days    Admit Date: 5/24/2021      Medications were reviewed for appropriateness and ongoing need.     Current Facility-Administered Medications   Medication Dose Route Frequency Provider Last Rate Last Admin   • ipratropium-albuterol (DUONEB) nebulizer solution  3 mL Nebulization Q4H PRN (RT) Jacob Chavez M.D.   3 mL at 06/02/21 1823   • carboxymethylcellulose (REFRESH TEARS) 0.5 % ophthalmic drops 1 Drop  1 Drop Left Eye Q2HRS PRN Eric Stone M.D.   1 Drop at 06/01/21 0511   • chlorhexidine (PERIDEX) 0.12 % solution 15 mL  15 mL Mouth/Throat BID NORRIS Engle   15 mL at 06/23/21 0616   • enoxaparin (LOVENOX) inj 40 mg  40 mg Subcutaneous DAILY Eric Stone M.D.   40 mg at 06/23/21 0616   • acetaminophen (Tylenol) tablet 650 mg  650 mg Enteral Tube Q4HRS PRN Eric Stone M.D.   650 mg at 06/08/21 1740   • Pharmacy Consult: Enteral tube insertion - review meds/change route/product selection  1 Each Other PHARMACY TO DOSE Venancio Johnson M.D.       • Respiratory Therapy Consult   Nebulization Continuous RT Annia Cabrera M.D.       • senna-docusate (PERICOLACE or SENOKOT S) 8.6-50 MG per tablet 1 tablet  1 tablet Enteral Tube Nightly Annia Cabrera M.D.   1 tablet at 06/19/21 2100   • senna-docusate (PERICOLACE or SENOKOT S) 8.6-50 MG per tablet 1 tablet  1 tablet Enteral Tube Q24HRS PRN Annia Cabrera M.D.       • polyethylene glycol/lytes (MIRALAX) PACKET 1 Packet  1 Packet Enteral Tube BID Annia Cabrera M.D.   1 Packet at 06/22/21 0524   • magnesium hydroxide (MILK OF MAGNESIA) suspension 30 mL  30 mL Enteral Tube DAILY Annia Cabrera M.D.   30 mL at 06/22/21 0524   • bisacodyl (DULCOLAX) suppository 10 mg  10 mg Rectal Q24HRS PRN Annia Cabrera M.D.       • fleet enema 133 mL  1 Each Rectal Once PRN Annia Cabrera M.D.       • oxyCODONE immediate-release (ROXICODONE) tablet 5 mg  5 mg Enteral Tube Q3HRS PRN Annia Cabrera M.D.   5 mg at  06/23/21 0627    Or   • oxyCODONE immediate-release (ROXICODONE) tablet 10 mg  10 mg Enteral Tube Q3HRS PRN Annia Cabrera M.D.   10 mg at 06/21/21 1615   • ondansetron (ZOFRAN) syringe/vial injection 4 mg  4 mg Intravenous Q4HRS PRN Annia Cabrera M.D.       • docusate sodium (Colace) oral solution 100 mg  100 mg Enteral Tube BID Annia Cabrera M.D.   100 mg at 06/22/21 0524       Recommendations:    -discontinue bisacodyl due to lack of use  -discontinue refresh tears due to lack of use  -discontinue fleet enema due to lack of use  -discontinue duplicate order of senna-docusate    Kinsey Benitez, Pharmacy Intern    Pharmacy Addendum:  Pharmacy Pharmacotherapy Consult   LOS >30 days  Admit Date: 5/24/2021    Medications were reviewed for appropriateness and ongoing need.   Current Facility-Administered Medications   Medication Dose Route Frequency Provider Last Rate Last Admin   • ipratropium-albuterol (DUONEB) nebulizer solution  3 mL Nebulization Q4H PRN (RT) Jacob Chavez M.D.   3 mL at 06/02/21 1823   • carboxymethylcellulose (REFRESH TEARS) 0.5 % ophthalmic drops 1 Drop  1 Drop Left Eye Q2HRS PRN Eric Stone M.D.   1 Drop at 06/01/21 0511   • chlorhexidine (PERIDEX) 0.12 % solution 15 mL  15 mL Mouth/Throat BID CINDI EngleRHuiNHui   15 mL at 06/23/21 0616   • enoxaparin (LOVENOX) inj 40 mg  40 mg Subcutaneous DAILY Eric Stone M.D.   40 mg at 06/23/21 0616   • acetaminophen (Tylenol) tablet 650 mg  650 mg Enteral Tube Q4HRS PRN Eric Stone M.D.   650 mg at 06/08/21 1740   • Pharmacy Consult: Enteral tube insertion - review meds/change route/product selection  1 Each Other PHARMACY TO DOSE Venancio Johnson M.D.       • Respiratory Therapy Consult   Nebulization Continuous RT Annia Cabrera M.D.       • senna-docusate (PERICOLACE or SENOKOT S) 8.6-50 MG per tablet 1 tablet  1 tablet Enteral Tube Nightly Annia Cabrera M.D.   1 tablet at 06/19/21 2100   • senna-docusate (PERICOLACE or  SENOKOT S) 8.6-50 MG per tablet 1 tablet  1 tablet Enteral Tube Q24HRS PRN Annia Cabrera M.D.       • polyethylene glycol/lytes (MIRALAX) PACKET 1 Packet  1 Packet Enteral Tube BID Annia Cabrera M.D.   1 Packet at 06/22/21 0524   • magnesium hydroxide (MILK OF MAGNESIA) suspension 30 mL  30 mL Enteral Tube DAILY Annia Cabrera M.D.   30 mL at 06/22/21 0524   • bisacodyl (DULCOLAX) suppository 10 mg  10 mg Rectal Q24HRS PRN Annia Cabrera M.D.       • fleet enema 133 mL  1 Each Rectal Once PRN Annia Cabrera M.D.       • oxyCODONE immediate-release (ROXICODONE) tablet 5 mg  5 mg Enteral Tube Q3HRS PRN Annia Cabrera M.D.   5 mg at 06/23/21 0627    Or   • oxyCODONE immediate-release (ROXICODONE) tablet 10 mg  10 mg Enteral Tube Q3HRS PRN Annia Cabrera M.D.   10 mg at 06/21/21 1615   • ondansetron (ZOFRAN) syringe/vial injection 4 mg  4 mg Intravenous Q4HRS PRN Annia Cabrera M.D.       • docusate sodium (Colace) oral solution 100 mg  100 mg Enteral Tube BID Annia Cabrera M.D.   100 mg at 06/22/21 0524     Recommendations:  1. Recommend discontinuation of bisacodyl, fleet enema, ondansetron IV, senna/docusate, and carboxymethylcellulose due to lack of use.  2. Recommend PPSV23 as patient appears due (51 YOA + alcohol use).    Gamal Barron, PharmD

## 2021-06-23 NOTE — PROGRESS NOTES
Received report from SHADE Peace  Pt is AAOx4  JOSE  VSS  Denies pain  Denies SOB  -N/V; tolerating bolus tube feeds via G tube. Pt able to do his own bolus feedings and free water flushes  +BS +Flatus +BM  Pt up self w/ steady gait  Telesitter in place for legal hold. Pt allowed belongings and visitors per psych  Bed locked and in the lowest position  Call light w/in reach  Hourly rounding in place

## 2021-06-23 NOTE — CARE PLAN
The patient is Stable - Low risk of patient condition declining or worsening    Shift Goals  Clinical Goals: mood stabilization.   Patient Goals: ambulate  Family Goals: na    Progress made toward(s) clinical / shift goals:  pt is calm and appropriate. In good spirits. Denies SI. Legal hold extended    Patient is not progressing towards the following goals:      Problem: Pain - Standard  Goal: Alleviation of pain or a reduction in pain to the patient’s comfort goal  Outcome: Progressing     Problem: Psychosocial  Goal: Patient's ability to identify and develop effective coping behaviors will improve  Outcome: Progressing
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
## 2021-06-23 NOTE — PROGRESS NOTES
Assumed care at 1845. Pt resting in bed. A&ox 4  Ambulating independently  NPo at this time  gtube in place. Bolus feedings by pt  Pain controlled  Voiding adequately  C/o loose stools. Refusing stool softeners  CT done tonight  O2 on RA  Tele sitter in place. Legal hold continued  Call light within reach. Hourly rounding in place

## 2021-06-23 NOTE — THERAPY
"Speech Language Pathology  Daily Treatment     Patient Name: Greg Tovar  Age:  51 y.o., Sex:  male  Medical Record #: [id number]  Today's Date: 6/23/2021     Precautions  Precautions: Swallow Precautions ( See Comments), PEG Tube  Comments:     Assessment    Patient seen on this date for dysphagia tx. Mother was present during session. Pt was able to walk independently to the bathroom to perform oral care. He rinsed x4 with overt coughing noted in 2/4 rinses. Pt had severe reduced ROM of mandible making it difficult to assess oral cavity. Pt has poor labial seal appreciated by anterior loss of secretions. Pt was presented with PO of x5 ice chips and mildly thick liquids x4 via tsp. Pt reports loss of taste, sensation, and numbness of his mouth. Anterior bolus loss and oral residue noted with mildly thick liquids requiring oral suctioning to clear bolus. No overt s/sx of aspiration occurred with PO trials. Pt instructed to perform effortful swallows, pt completed x20 with \"good\" accuracy given min cues. Education was provided to pt and mother regarding status and POC. Recommend pt remain NPO/PEG. Perform oral care throughout the day. Ok for 3-5 SMALL ice chips given 1:1 supervision. Consider MBS to objectively assess swallowing.      Plan    Continue current treatment plan.    Discharge Recommendations: Recommend post-acute placement for additional speech therapy services prior to discharge home    Subjective    \"It's all numb\"     Objective     06/23/21 1220   Cognitive-Linguistic   Level of Consciousness Alert   Dysphagia    Dysphagia X   Positioning / Behavior Modification Self Monitoring;Modulate Rate or Bite Size   Oral / Pharyngeal / Laryngeal Exercises Pharyngeal Constriction Exercises   Diet / Liquid Recommendation NPO;Pre-Feeding Trials with SLP Only   Nutritional Liquid Intake Rating Scale Nothing by mouth   Nutritional Food Intake Rating Scale Nothing by mouth   Nursing Communication Swallow Precaution Sign " "Posted at Head of Bed   Skilled Intervention Verbal Cueing;Tactile Cueing;Compensatory Strategies   Recommended Route of Medication Administration   Medication Administration  Via Gastric Tube   Patient / Family Goals   Patient / Family Goal #1 \" First step.\"   Goal #1 Outcome Progressing as expected   Short Term Goals   Short Term Goal # 2 Pt will consume prefeeding trials with no overt s/sx of aspiration   Goal Outcome # 2  Progressing slower than expected   Short Term Goal # 3 Pt will complete effortful swallows x100/day with good accuracy given min cues.         "

## 2021-06-23 NOTE — CARE PLAN
The patient is Stable - Low risk of patient condition declining or worsening    Shift Goals  Clinical Goals: mood stabilization.   Patient Goals: ambulate  Family Goals: na    Progress made toward(s) clinical / shift goals:  patient in a good/positive mood and ambulating frequently     Patient is not progressing towards the following goals:

## 2021-06-24 ENCOUNTER — APPOINTMENT (OUTPATIENT)
Dept: RADIOLOGY | Facility: MEDICAL CENTER | Age: 51
DRG: 003 | End: 2021-06-24
Attending: SPECIALIST
Payer: MEDICAID

## 2021-06-24 PROBLEM — F10.929 ACUTE ALCOHOL INTOXICATION (HCC): Status: RESOLVED | Noted: 2021-05-24 | Resolved: 2021-06-24

## 2021-06-24 PROBLEM — J96.90 RESPIRATORY FAILURE FOLLOWING TRAUMA (HCC): Status: RESOLVED | Noted: 2021-05-24 | Resolved: 2021-06-24

## 2021-06-24 PROBLEM — D72.829 LEUKOCYTOSIS: Status: RESOLVED | Noted: 2021-06-05 | Resolved: 2021-06-24

## 2021-06-24 LAB
ALBUMIN SERPL BCP-MCNC: 3 G/DL (ref 3.2–4.9)
ALBUMIN/GLOB SERPL: 1.2 G/DL
ALP SERPL-CCNC: 86 U/L (ref 30–99)
ALT SERPL-CCNC: 33 U/L (ref 2–50)
ANION GAP SERPL CALC-SCNC: 6 MMOL/L (ref 7–16)
AST SERPL-CCNC: 30 U/L (ref 12–45)
BASOPHILS # BLD AUTO: 0.3 % (ref 0–1.8)
BASOPHILS # BLD: 0.03 K/UL (ref 0–0.12)
BILIRUB SERPL-MCNC: 0.2 MG/DL (ref 0.1–1.5)
BUN SERPL-MCNC: 13 MG/DL (ref 8–22)
CALCIUM SERPL-MCNC: 8.8 MG/DL (ref 8.5–10.5)
CHLORIDE SERPL-SCNC: 105 MMOL/L (ref 96–112)
CO2 SERPL-SCNC: 29 MMOL/L (ref 20–33)
CREAT SERPL-MCNC: 0.76 MG/DL (ref 0.5–1.4)
EOSINOPHIL # BLD AUTO: 0.27 K/UL (ref 0–0.51)
EOSINOPHIL NFR BLD: 3 % (ref 0–6.9)
ERYTHROCYTE [DISTWIDTH] IN BLOOD BY AUTOMATED COUNT: 46.3 FL (ref 35.9–50)
GLOBULIN SER CALC-MCNC: 2.6 G/DL (ref 1.9–3.5)
GLUCOSE SERPL-MCNC: 110 MG/DL (ref 65–99)
HCT VFR BLD AUTO: 36.9 % (ref 42–52)
HGB BLD-MCNC: 12.1 G/DL (ref 14–18)
IMM GRANULOCYTES # BLD AUTO: 0.03 K/UL (ref 0–0.11)
IMM GRANULOCYTES NFR BLD AUTO: 0.3 % (ref 0–0.9)
LYMPHOCYTES # BLD AUTO: 2.74 K/UL (ref 1–4.8)
LYMPHOCYTES NFR BLD: 30.9 % (ref 22–41)
MAGNESIUM SERPL-MCNC: 2.3 MG/DL (ref 1.5–2.5)
MCH RBC QN AUTO: 30 PG (ref 27–33)
MCHC RBC AUTO-ENTMCNC: 32.8 G/DL (ref 33.7–35.3)
MCV RBC AUTO: 91.3 FL (ref 81.4–97.8)
MONOCYTES # BLD AUTO: 1.02 K/UL (ref 0–0.85)
MONOCYTES NFR BLD AUTO: 11.5 % (ref 0–13.4)
NEUTROPHILS # BLD AUTO: 4.78 K/UL (ref 1.82–7.42)
NEUTROPHILS NFR BLD: 54 % (ref 44–72)
NRBC # BLD AUTO: 0 K/UL
NRBC BLD-RTO: 0 /100 WBC
PHOSPHATE SERPL-MCNC: 3.6 MG/DL (ref 2.5–4.5)
PLATELET # BLD AUTO: 180 K/UL (ref 164–446)
PMV BLD AUTO: 10.6 FL (ref 9–12.9)
POTASSIUM SERPL-SCNC: 3.9 MMOL/L (ref 3.6–5.5)
PROT SERPL-MCNC: 5.6 G/DL (ref 6–8.2)
RBC # BLD AUTO: 4.04 M/UL (ref 4.7–6.1)
SODIUM SERPL-SCNC: 140 MMOL/L (ref 135–145)
WBC # BLD AUTO: 8.9 K/UL (ref 4.8–10.8)

## 2021-06-24 PROCEDURE — 80053 COMPREHEN METABOLIC PANEL: CPT

## 2021-06-24 PROCEDURE — A9270 NON-COVERED ITEM OR SERVICE: HCPCS | Performed by: SPECIALIST

## 2021-06-24 PROCEDURE — 83735 ASSAY OF MAGNESIUM: CPT

## 2021-06-24 PROCEDURE — A9270 NON-COVERED ITEM OR SERVICE: HCPCS | Performed by: SURGERY

## 2021-06-24 PROCEDURE — 84100 ASSAY OF PHOSPHORUS: CPT

## 2021-06-24 PROCEDURE — 92611 MOTION FLUOROSCOPY/SWALLOW: CPT

## 2021-06-24 PROCEDURE — 700102 HCHG RX REV CODE 250 W/ 637 OVERRIDE(OP): Performed by: SPECIALIST

## 2021-06-24 PROCEDURE — 700111 HCHG RX REV CODE 636 W/ 250 OVERRIDE (IP): Performed by: SPECIALIST

## 2021-06-24 PROCEDURE — 770001 HCHG ROOM/CARE - MED/SURG/GYN PRIV*

## 2021-06-24 PROCEDURE — 36415 COLL VENOUS BLD VENIPUNCTURE: CPT

## 2021-06-24 PROCEDURE — 700102 HCHG RX REV CODE 250 W/ 637 OVERRIDE(OP): Performed by: SURGERY

## 2021-06-24 PROCEDURE — 85025 COMPLETE CBC W/AUTO DIFF WBC: CPT

## 2021-06-24 PROCEDURE — 74230 X-RAY XM SWLNG FUNCJ C+: CPT

## 2021-06-24 RX ADMIN — CHLORHEXIDINE GLUCONATE 0.12% ORAL RINSE 15 ML: 1.2 LIQUID ORAL at 16:36

## 2021-06-24 RX ADMIN — DOCUSATE SODIUM 100 MG: 50 LIQUID ORAL at 16:36

## 2021-06-24 RX ADMIN — OXYCODONE 5 MG: 5 TABLET ORAL at 15:58

## 2021-06-24 RX ADMIN — CHLORHEXIDINE GLUCONATE 0.12% ORAL RINSE 15 ML: 1.2 LIQUID ORAL at 06:19

## 2021-06-24 RX ADMIN — ENOXAPARIN SODIUM 40 MG: 40 INJECTION SUBCUTANEOUS at 06:19

## 2021-06-24 ASSESSMENT — ENCOUNTER SYMPTOMS
HEMOPTYSIS: 0
PALPITATIONS: 0
FEVER: 0
NECK PAIN: 0
VOMITING: 0
CHILLS: 0
DOUBLE VISION: 0
HEARTBURN: 0
ROS GI COMMENTS: LAST BM 6/23
COUGH: 0
BLURRED VISION: 0
DIZZINESS: 0
HEADACHES: 0
NERVOUS/ANXIOUS: 0
MYALGIAS: 0
BACK PAIN: 0
NAUSEA: 0

## 2021-06-24 ASSESSMENT — PAIN DESCRIPTION - PAIN TYPE: TYPE: ACUTE PAIN
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
## 2021-06-24 NOTE — DISCHARGE PLANNING
Anticipated Discharge Disposition: Inpatient Psych Hospital.    Action: This case was discussed today during IDT Rounds. Per MD, this patient is not medically clear for transfer to an inpatient psych facility.    LSW reported, per CALLI Barr, as of today, the Legal Hold is extended until 7/1/21.    Barriers to Discharge: Medical Clearance.    Plan: As Above.
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
## 2021-06-24 NOTE — CARE PLAN
Problem: Communication  Goal: The ability to communicate needs accurately and effectively will improve  Outcome: Progressing     Problem: Mobility  Goal: Patient's capacity to carry out activities will improve  Outcome: Progressing   The patient is Stable - Low risk of patient condition declining or worsening    Shift Goals  Clinical Goals: Pt will verbalize undertanding of plan of care  Patient Goals: Update on plan care   Family Goals: N/A    Progress made toward(s) clinical / shift goals:  Speech at beside. Explaining plan for barium swallow today. Pt verbalized understanding.     Patient is not progressing towards the following goals:

## 2021-06-24 NOTE — PROGRESS NOTES
Assumed care at 1845. Pt resting in bed. A&ox 4  Ambulated in hallway multiple times  O2 on RA  Oral care done QID  Voiding adequately  C/o loose stools.   Pain controlled  gtube in place. isosource 5-6 daily. Pt does bolus feeding   Tele sitter in place. Legal hold .  Call light within reach. Hourly rounding in place
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
## 2021-06-24 NOTE — DISCHARGE PLANNING
Received Stipulation and Order from the court continuing pt's legal hold until 7/1. Sent copy to unit LSW.

## 2021-06-24 NOTE — PROGRESS NOTES
Tele sitters were down for the night. q15 minutes visual check done for the shift. Pt resting comfortably
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
## 2021-06-24 NOTE — PROGRESS NOTES
Pt A&O x 4.  Telesitter in place for legal hold- telesitters down at this time. Q15 min checks in place.  Declines pain at this time.   G-tube to LLQ clamped at this time. Pt tolerating bolus feeds. Pt demonstrates ability to do his own bolus feeds and water flushes.  Peroxide/water rinse  1/4 complete.   Followed up with speech. Plan for barium swallow at 1330 today.  POC discussed with pt. All needs addressed at this time.

## 2021-06-25 LAB
ALBUMIN SERPL BCP-MCNC: 3.5 G/DL (ref 3.2–4.9)
ALBUMIN/GLOB SERPL: 1.2 G/DL
ALP SERPL-CCNC: 95 U/L (ref 30–99)
ALT SERPL-CCNC: 38 U/L (ref 2–50)
ANION GAP SERPL CALC-SCNC: 6 MMOL/L (ref 7–16)
AST SERPL-CCNC: 33 U/L (ref 12–45)
BASOPHILS # BLD AUTO: 0.4 % (ref 0–1.8)
BASOPHILS # BLD: 0.03 K/UL (ref 0–0.12)
BILIRUB SERPL-MCNC: 0.5 MG/DL (ref 0.1–1.5)
BUN SERPL-MCNC: 12 MG/DL (ref 8–22)
CALCIUM SERPL-MCNC: 9.5 MG/DL (ref 8.5–10.5)
CHLORIDE SERPL-SCNC: 100 MMOL/L (ref 96–112)
CO2 SERPL-SCNC: 31 MMOL/L (ref 20–33)
CREAT SERPL-MCNC: 0.87 MG/DL (ref 0.5–1.4)
EOSINOPHIL # BLD AUTO: 0.2 K/UL (ref 0–0.51)
EOSINOPHIL NFR BLD: 2.7 % (ref 0–6.9)
ERYTHROCYTE [DISTWIDTH] IN BLOOD BY AUTOMATED COUNT: 47.1 FL (ref 35.9–50)
GLOBULIN SER CALC-MCNC: 2.9 G/DL (ref 1.9–3.5)
GLUCOSE SERPL-MCNC: 96 MG/DL (ref 65–99)
HCT VFR BLD AUTO: 41.7 % (ref 42–52)
HGB BLD-MCNC: 13.6 G/DL (ref 14–18)
IMM GRANULOCYTES # BLD AUTO: 0.03 K/UL (ref 0–0.11)
IMM GRANULOCYTES NFR BLD AUTO: 0.4 % (ref 0–0.9)
LYMPHOCYTES # BLD AUTO: 2.51 K/UL (ref 1–4.8)
LYMPHOCYTES NFR BLD: 34.1 % (ref 22–41)
MCH RBC QN AUTO: 30.2 PG (ref 27–33)
MCHC RBC AUTO-ENTMCNC: 32.6 G/DL (ref 33.7–35.3)
MCV RBC AUTO: 92.7 FL (ref 81.4–97.8)
MONOCYTES # BLD AUTO: 0.84 K/UL (ref 0–0.85)
MONOCYTES NFR BLD AUTO: 11.4 % (ref 0–13.4)
NEUTROPHILS # BLD AUTO: 3.76 K/UL (ref 1.82–7.42)
NEUTROPHILS NFR BLD: 51 % (ref 44–72)
NRBC # BLD AUTO: 0 K/UL
NRBC BLD-RTO: 0 /100 WBC
PLATELET # BLD AUTO: 183 K/UL (ref 164–446)
PMV BLD AUTO: 11.1 FL (ref 9–12.9)
POTASSIUM SERPL-SCNC: 4.3 MMOL/L (ref 3.6–5.5)
PROT SERPL-MCNC: 6.4 G/DL (ref 6–8.2)
RBC # BLD AUTO: 4.5 M/UL (ref 4.7–6.1)
SODIUM SERPL-SCNC: 137 MMOL/L (ref 135–145)
WBC # BLD AUTO: 7.4 K/UL (ref 4.8–10.8)

## 2021-06-25 PROCEDURE — 80053 COMPREHEN METABOLIC PANEL: CPT

## 2021-06-25 PROCEDURE — 700111 HCHG RX REV CODE 636 W/ 250 OVERRIDE (IP): Performed by: SPECIALIST

## 2021-06-25 PROCEDURE — 700102 HCHG RX REV CODE 250 W/ 637 OVERRIDE(OP): Performed by: SURGERY

## 2021-06-25 PROCEDURE — 85025 COMPLETE CBC W/AUTO DIFF WBC: CPT

## 2021-06-25 PROCEDURE — A9270 NON-COVERED ITEM OR SERVICE: HCPCS | Performed by: SPECIALIST

## 2021-06-25 PROCEDURE — 36415 COLL VENOUS BLD VENIPUNCTURE: CPT

## 2021-06-25 PROCEDURE — 700102 HCHG RX REV CODE 250 W/ 637 OVERRIDE(OP): Performed by: SPECIALIST

## 2021-06-25 PROCEDURE — A9270 NON-COVERED ITEM OR SERVICE: HCPCS | Performed by: SURGERY

## 2021-06-25 PROCEDURE — 770001 HCHG ROOM/CARE - MED/SURG/GYN PRIV*

## 2021-06-25 RX ADMIN — OXYCODONE 5 MG: 5 TABLET ORAL at 16:39

## 2021-06-25 RX ADMIN — POLYETHYLENE GLYCOL 3350 1 PACKET: 17 POWDER, FOR SOLUTION ORAL at 04:44

## 2021-06-25 RX ADMIN — DOCUSATE SODIUM 100 MG: 50 LIQUID ORAL at 04:44

## 2021-06-25 RX ADMIN — CHLORHEXIDINE GLUCONATE 0.12% ORAL RINSE 15 ML: 1.2 LIQUID ORAL at 04:44

## 2021-06-25 RX ADMIN — ENOXAPARIN SODIUM 40 MG: 40 INJECTION SUBCUTANEOUS at 04:44

## 2021-06-25 RX ADMIN — CHLORHEXIDINE GLUCONATE 0.12% ORAL RINSE 15 ML: 1.2 LIQUID ORAL at 18:55

## 2021-06-25 ASSESSMENT — ENCOUNTER SYMPTOMS
COUGH: 0
HEADACHES: 0
DOUBLE VISION: 0
NAUSEA: 0
NERVOUS/ANXIOUS: 0
MYALGIAS: 0
NECK PAIN: 0
FEVER: 0
BLURRED VISION: 0
CHILLS: 0
PALPITATIONS: 0
ROS GI COMMENTS: LAST BM 6/23
BACK PAIN: 0
HEARTBURN: 0
HEMOPTYSIS: 0
VOMITING: 0
DIZZINESS: 0

## 2021-06-25 ASSESSMENT — PAIN DESCRIPTION - PAIN TYPE
TYPE: ACUTE PAIN
TYPE: ACUTE PAIN

## 2021-06-25 NOTE — PROGRESS NOTES
Assumed care of patient at 0645. Bedside report received. Assessment complete.  AA&Ox4. Denies CP/SOB.  Legal hold, Telesitter in place but system is down. q15 minute checks in place.  Reporting 2/10 pain. Declined intervention at this time.   Educated patient regarding pharmacologic and non pharmacologic modalities for pain management.  Skin per flowsheets  G-tube to LLQ, clamped at this time. Tolerating bolus feeds. Patient performing bolus feeds and water flushes independently. Denies N/V.  Peroxide/water rinse per order.  + void. Last BM 6/24  Pt ambulates SBA in halls.    Plan of care discussed, all questions answered. Educated on the importance of calling before getting OOB and pt verbalizes understanding. Educated regarding importance of oral care. Oral care kit at bedside. Call light is within reach, treaded slipper socks on, bed in lowest/ locked position, hourly rounding in place, all needs met at this time

## 2021-06-25 NOTE — PROGRESS NOTES
Pharmacy Pharmacotherapy Consult for LOS >30 days    Admit Date: 5/24/2021      Medications were reviewed for appropriateness and ongoing need.     Current Facility-Administered Medications   Medication Dose Route Frequency Provider Last Rate Last Admin   • ipratropium-albuterol (DUONEB) nebulizer solution  3 mL Nebulization Q4H PRN (RT) Jenae Pickens P.A.-C.   3 mL at 06/02/21 1823   • chlorhexidine (PERIDEX) 0.12 % solution 15 mL  15 mL Mouth/Throat BID Jenae Pickens P.A.-C.   15 mL at 06/25/21 0444   • enoxaparin (LOVENOX) inj 40 mg  40 mg Subcutaneous DAILY Jenae Pickens P.A.-C.   40 mg at 06/25/21 0444   • acetaminophen (Tylenol) tablet 650 mg  650 mg Enteral Tube Q4HRS PRN Jenae Pickens P.A.-C.   650 mg at 06/08/21 1740   • Pharmacy Consult: Enteral tube insertion - review meds/change route/product selection  1 Each Other PHARMACY TO DOSE Jenae Pickens, P.A.-C.       • Respiratory Therapy Consult   Nebulization Continuous RT Annia Cabrera M.D.       • senna-docusate (PERICOLACE or SENOKOT S) 8.6-50 MG per tablet 1 tablet  1 tablet Enteral Tube Nightly Annia Cabrera M.D.   1 tablet at 06/19/21 2100   • polyethylene glycol/lytes (MIRALAX) PACKET 1 Packet  1 Packet Enteral Tube BID Annia Cabrera M.D.   1 Packet at 06/25/21 0444   • magnesium hydroxide (MILK OF MAGNESIA) suspension 30 mL  30 mL Enteral Tube DAILY Jenae Pickens P.A.-C.   30 mL at 06/22/21 0524   • oxyCODONE immediate-release (ROXICODONE) tablet 5 mg  5 mg Enteral Tube Q3HRS PRN Annia Cabrera M.D.   5 mg at 06/24/21 1558    Or   • oxyCODONE immediate-release (ROXICODONE) tablet 10 mg  10 mg Enteral Tube Q3HRS PRN Annia Cabrera M.D.   10 mg at 06/21/21 1615   • docusate sodium (Colace) oral solution 100 mg  100 mg Enteral Tube BID Annia Cabrera M.D.   100 mg at 06/25/21 0444       Recommendations:  No recommendation at this time    Norberto HumphriesD
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
## 2021-06-26 LAB
ALBUMIN SERPL BCP-MCNC: 3.2 G/DL (ref 3.2–4.9)
ALBUMIN/GLOB SERPL: 1.3 G/DL
ALP SERPL-CCNC: 87 U/L (ref 30–99)
ALT SERPL-CCNC: 38 U/L (ref 2–50)
ANION GAP SERPL CALC-SCNC: 7 MMOL/L (ref 7–16)
AST SERPL-CCNC: 37 U/L (ref 12–45)
BASOPHILS # BLD AUTO: 0.3 % (ref 0–1.8)
BASOPHILS # BLD: 0.02 K/UL (ref 0–0.12)
BILIRUB SERPL-MCNC: 0.4 MG/DL (ref 0.1–1.5)
BUN SERPL-MCNC: 11 MG/DL (ref 8–22)
CALCIUM SERPL-MCNC: 8.6 MG/DL (ref 8.5–10.5)
CHLORIDE SERPL-SCNC: 103 MMOL/L (ref 96–112)
CO2 SERPL-SCNC: 28 MMOL/L (ref 20–33)
CREAT SERPL-MCNC: 0.69 MG/DL (ref 0.5–1.4)
EOSINOPHIL # BLD AUTO: 0.18 K/UL (ref 0–0.51)
EOSINOPHIL NFR BLD: 2.5 % (ref 0–6.9)
ERYTHROCYTE [DISTWIDTH] IN BLOOD BY AUTOMATED COUNT: 46.1 FL (ref 35.9–50)
GLOBULIN SER CALC-MCNC: 2.5 G/DL (ref 1.9–3.5)
GLUCOSE SERPL-MCNC: 83 MG/DL (ref 65–99)
HCT VFR BLD AUTO: 37.4 % (ref 42–52)
HGB BLD-MCNC: 12.5 G/DL (ref 14–18)
IMM GRANULOCYTES # BLD AUTO: 0.02 K/UL (ref 0–0.11)
IMM GRANULOCYTES NFR BLD AUTO: 0.3 % (ref 0–0.9)
LYMPHOCYTES # BLD AUTO: 2.58 K/UL (ref 1–4.8)
LYMPHOCYTES NFR BLD: 36.1 % (ref 22–41)
MCH RBC QN AUTO: 30.3 PG (ref 27–33)
MCHC RBC AUTO-ENTMCNC: 33.4 G/DL (ref 33.7–35.3)
MCV RBC AUTO: 90.8 FL (ref 81.4–97.8)
MONOCYTES # BLD AUTO: 0.89 K/UL (ref 0–0.85)
MONOCYTES NFR BLD AUTO: 12.4 % (ref 0–13.4)
NEUTROPHILS # BLD AUTO: 3.46 K/UL (ref 1.82–7.42)
NEUTROPHILS NFR BLD: 48.4 % (ref 44–72)
NRBC # BLD AUTO: 0 K/UL
NRBC BLD-RTO: 0 /100 WBC
PLATELET # BLD AUTO: 164 K/UL (ref 164–446)
PMV BLD AUTO: 10.9 FL (ref 9–12.9)
POTASSIUM SERPL-SCNC: 3.9 MMOL/L (ref 3.6–5.5)
PROT SERPL-MCNC: 5.7 G/DL (ref 6–8.2)
RBC # BLD AUTO: 4.12 M/UL (ref 4.7–6.1)
SODIUM SERPL-SCNC: 138 MMOL/L (ref 135–145)
WBC # BLD AUTO: 7.2 K/UL (ref 4.8–10.8)

## 2021-06-26 PROCEDURE — 770001 HCHG ROOM/CARE - MED/SURG/GYN PRIV*

## 2021-06-26 PROCEDURE — 85025 COMPLETE CBC W/AUTO DIFF WBC: CPT

## 2021-06-26 PROCEDURE — 700102 HCHG RX REV CODE 250 W/ 637 OVERRIDE(OP): Performed by: SURGERY

## 2021-06-26 PROCEDURE — 80053 COMPREHEN METABOLIC PANEL: CPT

## 2021-06-26 PROCEDURE — 36415 COLL VENOUS BLD VENIPUNCTURE: CPT

## 2021-06-26 PROCEDURE — A9270 NON-COVERED ITEM OR SERVICE: HCPCS | Performed by: SURGERY

## 2021-06-26 PROCEDURE — 700111 HCHG RX REV CODE 636 W/ 250 OVERRIDE (IP): Performed by: SURGERY

## 2021-06-26 RX ADMIN — OXYCODONE 5 MG: 5 TABLET ORAL at 11:50

## 2021-06-26 RX ADMIN — OXYCODONE 5 MG: 5 TABLET ORAL at 20:38

## 2021-06-26 RX ADMIN — CHLORHEXIDINE GLUCONATE 0.12% ORAL RINSE 15 ML: 1.2 LIQUID ORAL at 06:35

## 2021-06-26 RX ADMIN — CHLORHEXIDINE GLUCONATE 0.12% ORAL RINSE 15 ML: 1.2 LIQUID ORAL at 18:33

## 2021-06-26 RX ADMIN — ENOXAPARIN SODIUM 40 MG: 40 INJECTION SUBCUTANEOUS at 06:35

## 2021-06-26 ASSESSMENT — ENCOUNTER SYMPTOMS
HEADACHES: 0
NAUSEA: 0
HEMOPTYSIS: 0
VOMITING: 0
BACK PAIN: 0
NERVOUS/ANXIOUS: 0
DIZZINESS: 0
CHILLS: 0
FEVER: 0
COUGH: 0
PALPITATIONS: 0
NECK PAIN: 0
BLURRED VISION: 0
ROS GI COMMENTS: LAST BM 6/25
DOUBLE VISION: 0

## 2021-06-26 ASSESSMENT — PAIN DESCRIPTION - PAIN TYPE
TYPE: ACUTE PAIN
TYPE: ACUTE PAIN
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
## 2021-06-26 NOTE — CARE PLAN
Problem: Pain - Standard  Goal: Alleviation of pain or a reduction in pain to the patient’s comfort goal  Outcome: Progressing     Problem: Psychosocial  Goal: Patient's level of anxiety will decrease  Outcome: Progressing   The patient is Stable - Low risk of patient condition declining or worsening    Shift Goals  Clinical Goals: Pt will remain free of harm  Patient Goals:   Family Goals: N/A    Progress made toward(s) clinical / shift goals:  Telesitter in use. Rounding in place. Non pharmacological and pharmacological interventions in use.     Patient is not progressing towards the following goals:

## 2021-06-26 NOTE — PROGRESS NOTES
Pt A&O x 4.  Telesitter in place for legal hold  Declines pain at this time.   G-tube to LLQ clamped at this time. Pt tolerating bolus feeds.  Sips of thin liquids okay.   Pt demonstrates ability to do his own bolus feeds and water flushes.  Peroxide/water rinse  1/4 complete.   Pt up self, steady gait noted.  POC discussed with pt. All needs addressed at this time
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
## 2021-06-27 PROBLEM — Z02.9 DISCHARGE PLANNING ISSUES: Status: ACTIVE | Noted: 2021-06-27

## 2021-06-27 PROBLEM — Z75.8 DISCHARGE PLANNING ISSUES: Status: ACTIVE | Noted: 2021-06-27

## 2021-06-27 LAB
ALBUMIN SERPL BCP-MCNC: 3.3 G/DL (ref 3.2–4.9)
ALBUMIN/GLOB SERPL: 1.4 G/DL
ALP SERPL-CCNC: 85 U/L (ref 30–99)
ALT SERPL-CCNC: 37 U/L (ref 2–50)
ANION GAP SERPL CALC-SCNC: 9 MMOL/L (ref 7–16)
AST SERPL-CCNC: 34 U/L (ref 12–45)
BASOPHILS # BLD AUTO: 0.5 % (ref 0–1.8)
BASOPHILS # BLD: 0.03 K/UL (ref 0–0.12)
BILIRUB SERPL-MCNC: 0.6 MG/DL (ref 0.1–1.5)
BUN SERPL-MCNC: 9 MG/DL (ref 8–22)
CALCIUM SERPL-MCNC: 8.5 MG/DL (ref 8.5–10.5)
CHLORIDE SERPL-SCNC: 101 MMOL/L (ref 96–112)
CO2 SERPL-SCNC: 28 MMOL/L (ref 20–33)
CREAT SERPL-MCNC: 0.83 MG/DL (ref 0.5–1.4)
EOSINOPHIL # BLD AUTO: 0.16 K/UL (ref 0–0.51)
EOSINOPHIL NFR BLD: 2.5 % (ref 0–6.9)
ERYTHROCYTE [DISTWIDTH] IN BLOOD BY AUTOMATED COUNT: 46.3 FL (ref 35.9–50)
GLOBULIN SER CALC-MCNC: 2.4 G/DL (ref 1.9–3.5)
GLUCOSE SERPL-MCNC: 86 MG/DL (ref 65–99)
HCT VFR BLD AUTO: 37.5 % (ref 42–52)
HGB BLD-MCNC: 12.1 G/DL (ref 14–18)
IMM GRANULOCYTES # BLD AUTO: 0.02 K/UL (ref 0–0.11)
IMM GRANULOCYTES NFR BLD AUTO: 0.3 % (ref 0–0.9)
LYMPHOCYTES # BLD AUTO: 2.09 K/UL (ref 1–4.8)
LYMPHOCYTES NFR BLD: 33.3 % (ref 22–41)
MCH RBC QN AUTO: 29.4 PG (ref 27–33)
MCHC RBC AUTO-ENTMCNC: 32.3 G/DL (ref 33.7–35.3)
MCV RBC AUTO: 91.2 FL (ref 81.4–97.8)
MONOCYTES # BLD AUTO: 0.87 K/UL (ref 0–0.85)
MONOCYTES NFR BLD AUTO: 13.9 % (ref 0–13.4)
NEUTROPHILS # BLD AUTO: 3.11 K/UL (ref 1.82–7.42)
NEUTROPHILS NFR BLD: 49.5 % (ref 44–72)
NRBC # BLD AUTO: 0 K/UL
NRBC BLD-RTO: 0 /100 WBC
PLATELET # BLD AUTO: 154 K/UL (ref 164–446)
PMV BLD AUTO: 11.1 FL (ref 9–12.9)
POTASSIUM SERPL-SCNC: 3.8 MMOL/L (ref 3.6–5.5)
PROT SERPL-MCNC: 5.7 G/DL (ref 6–8.2)
RBC # BLD AUTO: 4.11 M/UL (ref 4.7–6.1)
SODIUM SERPL-SCNC: 138 MMOL/L (ref 135–145)
WBC # BLD AUTO: 6.3 K/UL (ref 4.8–10.8)

## 2021-06-27 PROCEDURE — 700111 HCHG RX REV CODE 636 W/ 250 OVERRIDE (IP): Performed by: SURGERY

## 2021-06-27 PROCEDURE — A9270 NON-COVERED ITEM OR SERVICE: HCPCS | Performed by: NURSE PRACTITIONER

## 2021-06-27 PROCEDURE — 700102 HCHG RX REV CODE 250 W/ 637 OVERRIDE(OP): Performed by: NURSE PRACTITIONER

## 2021-06-27 PROCEDURE — 770001 HCHG ROOM/CARE - MED/SURG/GYN PRIV*

## 2021-06-27 PROCEDURE — 80053 COMPREHEN METABOLIC PANEL: CPT

## 2021-06-27 PROCEDURE — 90837 PSYTX W PT 60 MINUTES: CPT | Performed by: PSYCHOLOGIST

## 2021-06-27 PROCEDURE — A9270 NON-COVERED ITEM OR SERVICE: HCPCS | Performed by: SURGERY

## 2021-06-27 PROCEDURE — 36415 COLL VENOUS BLD VENIPUNCTURE: CPT

## 2021-06-27 PROCEDURE — 700102 HCHG RX REV CODE 250 W/ 637 OVERRIDE(OP): Performed by: SURGERY

## 2021-06-27 PROCEDURE — 85025 COMPLETE CBC W/AUTO DIFF WBC: CPT

## 2021-06-27 RX ORDER — OXYCODONE HYDROCHLORIDE 5 MG/1
5 TABLET ORAL EVERY 6 HOURS PRN
Status: DISCONTINUED | OUTPATIENT
Start: 2021-06-27 | End: 2021-06-28

## 2021-06-27 RX ORDER — ECHINACEA PURPUREA EXTRACT 125 MG
2 TABLET ORAL
Status: DISCONTINUED | OUTPATIENT
Start: 2021-06-27 | End: 2021-07-01 | Stop reason: HOSPADM

## 2021-06-27 RX ORDER — ACETAMINOPHEN 325 MG/1
650 TABLET ORAL EVERY 4 HOURS PRN
Status: DISCONTINUED | OUTPATIENT
Start: 2021-06-27 | End: 2021-07-01 | Stop reason: HOSPADM

## 2021-06-27 RX ADMIN — OXYCODONE 5 MG: 5 TABLET ORAL at 17:46

## 2021-06-27 RX ADMIN — ENOXAPARIN SODIUM 40 MG: 40 INJECTION SUBCUTANEOUS at 06:14

## 2021-06-27 RX ADMIN — CHLORHEXIDINE GLUCONATE 0.12% ORAL RINSE 15 ML: 1.2 LIQUID ORAL at 17:46

## 2021-06-27 RX ADMIN — DOCUSATE SODIUM 100 MG: 50 LIQUID ORAL at 17:46

## 2021-06-27 RX ADMIN — POLYETHYLENE GLYCOL 3350 1 PACKET: 17 POWDER, FOR SOLUTION ORAL at 17:47

## 2021-06-27 RX ADMIN — CHLORHEXIDINE GLUCONATE 0.12% ORAL RINSE 15 ML: 1.2 LIQUID ORAL at 06:16

## 2021-06-27 ASSESSMENT — ENCOUNTER SYMPTOMS
CONSTIPATION: 0
DOUBLE VISION: 0
ABDOMINAL PAIN: 0
VOMITING: 0
NECK PAIN: 0
HEADACHES: 0
DIZZINESS: 0
NAUSEA: 0
CHILLS: 0
SHORTNESS OF BREATH: 0
BACK PAIN: 0
FEVER: 0
MYALGIAS: 0
BLURRED VISION: 1

## 2021-06-27 ASSESSMENT — PAIN DESCRIPTION - PAIN TYPE
TYPE: ACUTE PAIN
TYPE: ACUTE PAIN

## 2021-06-27 NOTE — PROGRESS NOTES
Report received. Assessment completed.  Pt is A&O x4. Pt on room air.   Medicating for pain PRN per MAR  Denies nausea.  PEG Tube sutured in place, Pt. performing Bolus Feedings, tolerating   Oral care performed QID also before and after any PO    +BM today  +voiding.  Pt up self.  Call light and belongings within reach. All needs met at this time. Fall Precautions and hourly rounding in place.

## 2021-06-27 NOTE — ASSESSMENT & PLAN NOTE
6/27 Legal hold discontinued.  Pts apartment is being remodeled.  Plan is to go stay with friends in [place] for ~ 6 months. They are available to take pt to follow up appointments as needed.  Will need follow up with:  - Dr. Cabrera  - Dr. Okeefe and/or Dr. Hill  - PCP (scheduled for 7/29)  - Psychiatry  - Ophthalmology.  -   - Outpatient SLP for ongoing dysphagia therapy/treatment (order sent, they will call to schedule upon discharge)

## 2021-06-27 NOTE — CONSULTS
"BRIEF PSYCHIATRIC CONSULT NOTE: patient seen and assessed, Affect is euthymic. Mood is stated as \"good.\" He continues to deny current and active suicidality. He also continues to be very future focused on psychological and physiological recovery. His safety and coping plan is comprehensive and includes outpatient mental health care and living with his family. He is not depressed or anxious. Full note to follow.    -Legal Hold:dc'd     -Trauma and Stressor Related Disorder    -No psychiatric medication needs    -Patient will need assistance with setting up outpatient mental health care depending on where he ends up moving    -Will follow when able for brief psychotherapy               "

## 2021-06-27 NOTE — DISCHARGE PLANNING
Anticipated Discharge Disposition:   TBD    Action:   Per SHADE Willis. Pts Legal Hold was discontinued by Psychiatr today.     Per Marilin Grewal , Psychiatry notes today, Pt;s Legal hold is discontinued and Pt will need assistance with setting up OP Mental Health Care.     Barriers to Discharge:   Pending medical clearance    Plan:   Will continue to assist Pt with discharge as needed

## 2021-06-27 NOTE — CARE PLAN
Problem: Pain - Standard  Goal: Alleviation of pain or a reduction in pain to the patient’s comfort goal  Outcome: Progressing     Problem: Psychosocial  Goal: Patient's level of anxiety will decrease  Outcome: Progressing     Problem: Communication  Goal: The ability to communicate needs accurately and effectively will improve  Outcome: Not Progressing     Problem: Respiratory  Goal: Patient will achieve/maintain optimum respiratory ventilation and gas exchange  Outcome: Progressing     Problem: Mobility  Goal: Patient's capacity to carry out activities will improve  Outcome: Progressing   The patient is Stable - Low risk of patient condition declining or worsening    Shift Goals  Clinical Goals: Swallowing exercises, Manage PEG  Patient Goals: go outside  Family Goals: N/A    Progress made toward(s) clinical / shift goals:  Medicating for pain PRN per MAR, Pt able to give self bolus feeds via PEG    Patient is not progressing towards the following goals:      Problem: Communication  Goal: The ability to communicate needs accurately and effectively will improve  Outcome: Not Progressing

## 2021-06-27 NOTE — CARE PLAN
The patient is Stable - Low risk of patient condition declining or worsening    Shift Goals  Clinical Goals: tolerate swallow exercises  Patient Goals: go outside  Family Goals: N/A    Progress made toward(s) clinical / shift goals: pt can take sips with supervision and oral care before and after    Patient is not progressing towards the following goals:      Problem: Pain - Standard  Goal: Alleviation of pain or a reduction in pain to the patient’s comfort goal  Outcome: Progressing     Problem: Psychosocial  Goal: Patient's ability to identify and develop effective coping behaviors will improve  Outcome: Progressing
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
## 2021-06-27 NOTE — PROGRESS NOTES
Assumed care at 1845. Pt resting in bed. A&ox 4  Ambulating with SBA  Peg tube in place. Bolus feedings done by pt  Oral care done QID. Remains NPO  Voiding in BR  Multiple stools qday  Pain controlled  Tele sitter in place. Legal hold extended  Call light within reach. Hourly rounding in place

## 2021-06-28 ENCOUNTER — TELEPHONE (OUTPATIENT)
Dept: SCHEDULING | Facility: IMAGING CENTER | Age: 51
End: 2021-06-28

## 2021-06-28 LAB
ALBUMIN SERPL BCP-MCNC: 3.2 G/DL (ref 3.2–4.9)
ALBUMIN/GLOB SERPL: 1.2 G/DL
ALP SERPL-CCNC: 90 U/L (ref 30–99)
ALT SERPL-CCNC: 36 U/L (ref 2–50)
ANION GAP SERPL CALC-SCNC: 10 MMOL/L (ref 7–16)
AST SERPL-CCNC: 31 U/L (ref 12–45)
BASOPHILS # BLD AUTO: 0.5 % (ref 0–1.8)
BASOPHILS # BLD: 0.03 K/UL (ref 0–0.12)
BILIRUB SERPL-MCNC: 0.4 MG/DL (ref 0.1–1.5)
BUN SERPL-MCNC: 10 MG/DL (ref 8–22)
CALCIUM SERPL-MCNC: 8.9 MG/DL (ref 8.5–10.5)
CHLORIDE SERPL-SCNC: 103 MMOL/L (ref 96–112)
CO2 SERPL-SCNC: 27 MMOL/L (ref 20–33)
CREAT SERPL-MCNC: 0.75 MG/DL (ref 0.5–1.4)
CRP SERPL HS-MCNC: <0.3 MG/DL (ref 0–0.75)
EOSINOPHIL # BLD AUTO: 0.15 K/UL (ref 0–0.51)
EOSINOPHIL NFR BLD: 2.7 % (ref 0–6.9)
ERYTHROCYTE [DISTWIDTH] IN BLOOD BY AUTOMATED COUNT: 45.9 FL (ref 35.9–50)
GLOBULIN SER CALC-MCNC: 2.7 G/DL (ref 1.9–3.5)
GLUCOSE SERPL-MCNC: 94 MG/DL (ref 65–99)
HCT VFR BLD AUTO: 38.2 % (ref 42–52)
HGB BLD-MCNC: 12.7 G/DL (ref 14–18)
IMM GRANULOCYTES # BLD AUTO: 0.01 K/UL (ref 0–0.11)
IMM GRANULOCYTES NFR BLD AUTO: 0.2 % (ref 0–0.9)
LYMPHOCYTES # BLD AUTO: 1.84 K/UL (ref 1–4.8)
LYMPHOCYTES NFR BLD: 33.2 % (ref 22–41)
MAGNESIUM SERPL-MCNC: 2 MG/DL (ref 1.5–2.5)
MCH RBC QN AUTO: 30.3 PG (ref 27–33)
MCHC RBC AUTO-ENTMCNC: 33.2 G/DL (ref 33.7–35.3)
MCV RBC AUTO: 91.2 FL (ref 81.4–97.8)
MONOCYTES # BLD AUTO: 0.79 K/UL (ref 0–0.85)
MONOCYTES NFR BLD AUTO: 14.2 % (ref 0–13.4)
NEUTROPHILS # BLD AUTO: 2.73 K/UL (ref 1.82–7.42)
NEUTROPHILS NFR BLD: 49.2 % (ref 44–72)
NRBC # BLD AUTO: 0 K/UL
NRBC BLD-RTO: 0 /100 WBC
PHOSPHATE SERPL-MCNC: 3.6 MG/DL (ref 2.5–4.5)
PLATELET # BLD AUTO: 160 K/UL (ref 164–446)
PMV BLD AUTO: 11.2 FL (ref 9–12.9)
POTASSIUM SERPL-SCNC: 4.3 MMOL/L (ref 3.6–5.5)
PREALB SERPL-MCNC: 22.9 MG/DL (ref 18–38)
PROT SERPL-MCNC: 5.9 G/DL (ref 6–8.2)
RBC # BLD AUTO: 4.19 M/UL (ref 4.7–6.1)
SODIUM SERPL-SCNC: 140 MMOL/L (ref 135–145)
WBC # BLD AUTO: 5.6 K/UL (ref 4.8–10.8)

## 2021-06-28 PROCEDURE — 700102 HCHG RX REV CODE 250 W/ 637 OVERRIDE(OP): Performed by: NURSE PRACTITIONER

## 2021-06-28 PROCEDURE — 770001 HCHG ROOM/CARE - MED/SURG/GYN PRIV*

## 2021-06-28 PROCEDURE — 84134 ASSAY OF PREALBUMIN: CPT

## 2021-06-28 PROCEDURE — A9270 NON-COVERED ITEM OR SERVICE: HCPCS | Performed by: SURGERY

## 2021-06-28 PROCEDURE — 700102 HCHG RX REV CODE 250 W/ 637 OVERRIDE(OP): Performed by: SURGERY

## 2021-06-28 PROCEDURE — 36415 COLL VENOUS BLD VENIPUNCTURE: CPT

## 2021-06-28 PROCEDURE — A9270 NON-COVERED ITEM OR SERVICE: HCPCS | Performed by: NURSE PRACTITIONER

## 2021-06-28 PROCEDURE — 85025 COMPLETE CBC W/AUTO DIFF WBC: CPT

## 2021-06-28 PROCEDURE — 700111 HCHG RX REV CODE 636 W/ 250 OVERRIDE (IP): Performed by: SURGERY

## 2021-06-28 PROCEDURE — 86140 C-REACTIVE PROTEIN: CPT

## 2021-06-28 PROCEDURE — 83735 ASSAY OF MAGNESIUM: CPT

## 2021-06-28 PROCEDURE — 84100 ASSAY OF PHOSPHORUS: CPT

## 2021-06-28 PROCEDURE — 80053 COMPREHEN METABOLIC PANEL: CPT

## 2021-06-28 RX ADMIN — CHLORHEXIDINE GLUCONATE 0.12% ORAL RINSE 15 ML: 1.2 LIQUID ORAL at 04:33

## 2021-06-28 RX ADMIN — ACETAMINOPHEN 650 MG: 325 TABLET, FILM COATED ORAL at 19:00

## 2021-06-28 RX ADMIN — CHLORHEXIDINE GLUCONATE 0.12% ORAL RINSE 15 ML: 1.2 LIQUID ORAL at 19:00

## 2021-06-28 RX ADMIN — ENOXAPARIN SODIUM 40 MG: 40 INJECTION SUBCUTANEOUS at 04:33

## 2021-06-28 RX ADMIN — POLYETHYLENE GLYCOL 3350 1 PACKET: 17 POWDER, FOR SOLUTION ORAL at 19:00

## 2021-06-28 RX ADMIN — DOCUSATE SODIUM 100 MG: 50 LIQUID ORAL at 19:00

## 2021-06-28 ASSESSMENT — ENCOUNTER SYMPTOMS
FEVER: 0
SHORTNESS OF BREATH: 0
NAUSEA: 0
VOMITING: 0
BLURRED VISION: 1
DOUBLE VISION: 0
DIZZINESS: 0
HEADACHES: 0
MYALGIAS: 0
ABDOMINAL PAIN: 0
CONSTIPATION: 0
NECK PAIN: 0
BACK PAIN: 0
CHILLS: 0

## 2021-06-28 ASSESSMENT — PAIN DESCRIPTION - PAIN TYPE
TYPE: ACUTE PAIN
TYPE: ACUTE PAIN

## 2021-06-28 NOTE — CARE PLAN
Problem: Psychosocial  Goal: Patient's ability to identify and develop effective coping behaviors will improve  Outcome: Progressing     Problem: Psychosocial  Goal: Patient's ability to verbalize feelings about condition will improve  Outcome: Progressing   The patient is Stable - Low risk of patient condition declining or worsening    Shift Goals  Clinical Goals: Discharge POC, continue PEG care and TF  Patient Goals: sleep  Family Goals: na    Progress made toward(s) clinical / shift goals:  Pt continues to perform self care of PEG tube, addressed questions with dietary for home formula.  Denies pain at this time.  Discharge planning in progress    Patient is not progressing towards the following goals:  N/a

## 2021-06-28 NOTE — CARE PLAN
The patient is Stable - Low risk of patient condition declining or worsening    Shift Goals  Clinical Goals: continue self care and self feedings  Patient Goals: sleep  Family Goals: na    Progress made toward(s) clinical / shift goals:  pt requires no staff assistance with g tube care and feedings.
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
## 2021-06-28 NOTE — DISCHARGE PLANNING
Received Choice form at 1417  Agency/Facility Name: [facility]   Referral sent per Choice form @ 2965     Agency/Facility Name: [facility]  Spoke To: Kenyatta   Outcome: Per Kenyatta, referral was received.  Medicaid will take up to 3 business days to respond.  [facility] is requiring Pt to have a following MD     RN CM notified

## 2021-06-28 NOTE — PROGRESS NOTES
Report received. Assessment completed.  Pt is A&O x4. Pt on room air.   Medicating for pain PRN per MAR  Denies nausea.  PEG Tube sutured in place, Pt. performing Bolus Feedings, tolerating          Oral care performed QID also before and after any PO    +BM today  +voiding.  Pt up self.  Call light and belongings within reach. All needs met at this time. Fall Precautions and hourly rounding in place
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
## 2021-06-28 NOTE — DISCHARGE PLANNING
Anticipated Discharge Disposition: Home with family    Action: RN CM spoke with patient at bedside about discharge planning. Per patient he will be living with his sister and brother-in-law until his apartment is remodeled. The patient would like to see if his insurance will cover his tube feeds. Enteral choice received for: [facility], [facility], and [facility]. [facility] form completed and signed by physician. Referral form faxed.     Per patient he does not have a PCP. The patient would like help with PCP set up through [facility]. The patient will need outpatient speech therapy. RN CM spoke with [facility]. Per Outpatient Therapy they accept his insurance and will need a referral in Epic. ADAMSN notified.     Barriers to Discharge: medical clearance, Enteral Feed referral acceptance     Plan: Follow up with medical team    Addendum:  RN CM called [facility] scheduling for primary care. Appointment set with Diana UGALDE for July 29th at 9:00 am. Appointment time placed on discharge paperwork.

## 2021-06-29 PROCEDURE — 92526 ORAL FUNCTION THERAPY: CPT

## 2021-06-29 PROCEDURE — A9270 NON-COVERED ITEM OR SERVICE: HCPCS | Performed by: SURGERY

## 2021-06-29 PROCEDURE — 770001 HCHG ROOM/CARE - MED/SURG/GYN PRIV*

## 2021-06-29 PROCEDURE — 700111 HCHG RX REV CODE 636 W/ 250 OVERRIDE (IP): Performed by: SURGERY

## 2021-06-29 PROCEDURE — 700102 HCHG RX REV CODE 250 W/ 637 OVERRIDE(OP): Performed by: SURGERY

## 2021-06-29 PROCEDURE — 700102 HCHG RX REV CODE 250 W/ 637 OVERRIDE(OP): Performed by: NURSE PRACTITIONER

## 2021-06-29 PROCEDURE — A9270 NON-COVERED ITEM OR SERVICE: HCPCS | Performed by: NURSE PRACTITIONER

## 2021-06-29 RX ORDER — ACETAMINOPHEN 325 MG/1
650 TABLET ORAL EVERY 6 HOURS PRN
Status: ON HOLD | COMMUNITY
Start: 2021-06-29 | End: 2022-08-24

## 2021-06-29 RX ADMIN — CHLORHEXIDINE GLUCONATE 0.12% ORAL RINSE 15 ML: 1.2 LIQUID ORAL at 18:10

## 2021-06-29 RX ADMIN — CHLORHEXIDINE GLUCONATE 0.12% ORAL RINSE 15 ML: 1.2 LIQUID ORAL at 06:34

## 2021-06-29 RX ADMIN — ENOXAPARIN SODIUM 40 MG: 40 INJECTION SUBCUTANEOUS at 06:35

## 2021-06-29 RX ADMIN — SALINE NASAL SPRAY 2 SPRAY: 1.5 SOLUTION NASAL at 22:04

## 2021-06-29 RX ADMIN — ACETAMINOPHEN 650 MG: 325 TABLET, FILM COATED ORAL at 18:11

## 2021-06-29 ASSESSMENT — ENCOUNTER SYMPTOMS
HEADACHES: 0
CONSTIPATION: 0
SHORTNESS OF BREATH: 0
DIZZINESS: 0
MYALGIAS: 0
NAUSEA: 0
FEVER: 0
DOUBLE VISION: 0
BLURRED VISION: 1
CHILLS: 0
VOMITING: 0
NECK PAIN: 0
ABDOMINAL PAIN: 0
BACK PAIN: 0

## 2021-06-29 ASSESSMENT — PAIN DESCRIPTION - PAIN TYPE
TYPE: ACUTE PAIN

## 2021-06-29 NOTE — DISCHARGE PLANNING
Agency/Facility Name: [facility]  Spoke To: Kenyatta  Outcome: Per Kenyatta Medicaid auth has not come through yet

## 2021-06-29 NOTE — PROGRESS NOTES
Assumed care at 1845. Pt resting in bed. A&ox 4  Ambulating independently  O2 on RA  NPO at this time. Thin liquids with supervision  Peg tube in place. Pt doing bolus feedings independently  Oral care 4x day  Voiding in the BR  +BM  Pain controlled  Call light within reach. Hourly rounding in place

## 2021-06-29 NOTE — THERAPY
"Speech Language Pathology  Daily Treatment     Patient Name: Greg Tovar  Age:  51 y.o., Sex:  male  Medical Record #: [id number]  Today's Date: 6/29/2021     Precautions  Precautions: (P) Swallow Precautions ( See Comments), PEG Tube  Comments: (P) Legal hold discontinued    Assessment    Patient seen for dysphagia therapy on this date.  Provided education to the patient and his mom regarding results of the Modified Barium Swallow Study and structural changes observed on the MBSS.  Both verbalized understanding and the patient was able to teach back education.  He declined PO intake and stated he swishes with water throughout the day and only takes \"a little coffee\" but did not want any at this time.  Speech remains with reduced intelligibility but is able to make his wants and needs known through repetition and gestures.  Patient stated he has been completing his dysphagia exercises but \"there is always homework everyone wants me to do.\"  He is very agreeable and participates fully in SLP therapy sessions.  Patient required min cues to effortful swallow and completed x10 with good accuracy.  He demonstrated accountability to self and stated, \"that one doesn't count\" when he was unable to complete a swallow.  Recommend the patient continue PEG tube feedings as primary source of nutrition.  Okay for single sips of thins with STRICT swallowing strategies (lips pressed together, swallow hard, upright for ~30 after PO intake, oral care before and after PO intake).  SLP following.     Plan    Continue current treatment plan.    Discharge Recommendations: (P) Recommend outpatient speech therapy services    Subjective    Pleasant and agreeable     Objective       06/29/21 1206   Dysphagia    Oral / Pharyngeal / Laryngeal Exercises Pharyngeal Constriction Exercises   Diet / Liquid Recommendation NPO;Pre-Feeding Trials with SLP Only   Nutritional Liquid Intake Rating Scale Non thickened beverages  (PEG as primary source of " "nutrition )   Nutritional Food Intake Rating Scale Tube dependent with minimal attempts of oral intake   Nursing Communication Swallow Precaution Sign Posted at Head of Bed   Skilled Intervention Verbal Cueing   Recommended Route of Medication Administration   Medication Administration  Via Gastric Tube   Patient / Family Goals   Patient / Family Goal #1 \" First step.\"   Goal #1 Outcome Progressing as expected   Short Term Goals   Short Term Goal # 3 Pt will complete effortful swallows x100/day with good accuracy given min cues.   Goal Outcome  # 3 Progressing as expected         "

## 2021-06-29 NOTE — CARE PLAN
The patient is Stable - Low risk of patient condition declining or worsening    Shift Goals  Clinical Goals: tolerates sips of thins. oral care  Patient Goals: sleep  Family Goals: na    Progress made toward(s) clinical / shift goals:  pt able to demonstrate proper oral care and swallowing techniques. Supervision with thin liquids    Patient is not progressing towards the following goals:      Problem: Psychosocial  Goal: Patient's ability to identify and develop effective coping behaviors will improve  Outcome: Progressing     Problem: Depression  Goal: Patient and family/caregiver will verbalize accurate information about at least two of the possible causes of depression, three-four of the signs and symptoms of depression  Outcome: Progressing

## 2021-06-29 NOTE — DISCHARGE PLANNING
Anticipated Discharge Disposition: Home with Family and Bolus Feeds.    Action: This case was discussed today during Trauma Rounds. Per Antoinette, Trauma A.P.R.N, this patient is medically cleared for discharge home once the bolus tube feeds are authorized by his insurance, DPA aware.    Per CALLI, [facility] has not received authorization from Medicaid.    Barriers to Discharge: Insurance Authorization.    Plan: As Above. Continued to monitor for insurance authorization.

## 2021-06-29 NOTE — PROGRESS NOTES
Received report from SHADE Peace  Pt is AAOx4  GARCIA  VSS  Denies pain  Denies SOB  -N/V; tolerating bolus tube feeds via G tube. Pt able to do his own bolus feedings and free water flushes. Thin liquids with supervision.  4x per day oral care   +BS +Flatus +BM  Pt up self w/ steady gait  Bed locked and in the lowest position  Call light w/in reach  Hourly rounding in place
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
## 2021-06-29 NOTE — CARE PLAN
The patient is Stable - Low risk of patient condition declining or worsening    Shift Goals  Clinical Goals: tolerates sips of thins. oral care  Patient Goals: sleep  Family Goals: na    Progress made toward(s) clinical / shift goals:  psych rounding on patient. Pt has positive affect     Patient is not progressing towards the following goals:

## 2021-06-30 PROCEDURE — A9270 NON-COVERED ITEM OR SERVICE: HCPCS | Performed by: SURGERY

## 2021-06-30 PROCEDURE — A9270 NON-COVERED ITEM OR SERVICE: HCPCS | Performed by: NURSE PRACTITIONER

## 2021-06-30 PROCEDURE — 770001 HCHG ROOM/CARE - MED/SURG/GYN PRIV*

## 2021-06-30 PROCEDURE — 700102 HCHG RX REV CODE 250 W/ 637 OVERRIDE(OP): Performed by: NURSE PRACTITIONER

## 2021-06-30 PROCEDURE — 700111 HCHG RX REV CODE 636 W/ 250 OVERRIDE (IP): Performed by: SURGERY

## 2021-06-30 PROCEDURE — 700102 HCHG RX REV CODE 250 W/ 637 OVERRIDE(OP): Performed by: SURGERY

## 2021-06-30 RX ADMIN — CHLORHEXIDINE GLUCONATE 0.12% ORAL RINSE 15 ML: 1.2 LIQUID ORAL at 17:59

## 2021-06-30 RX ADMIN — ACETAMINOPHEN 650 MG: 325 TABLET, FILM COATED ORAL at 21:19

## 2021-06-30 RX ADMIN — ENOXAPARIN SODIUM 40 MG: 40 INJECTION SUBCUTANEOUS at 06:46

## 2021-06-30 RX ADMIN — CHLORHEXIDINE GLUCONATE 0.12% ORAL RINSE 15 ML: 1.2 LIQUID ORAL at 06:46

## 2021-06-30 ASSESSMENT — ENCOUNTER SYMPTOMS
CONSTIPATION: 0
SHORTNESS OF BREATH: 0
BACK PAIN: 0
DIZZINESS: 0
BLURRED VISION: 1
ABDOMINAL PAIN: 0
NAUSEA: 0
FEVER: 0
CHILLS: 0
HEADACHES: 0
MYALGIAS: 0
DOUBLE VISION: 0
VOMITING: 0
NECK PAIN: 0

## 2021-06-30 ASSESSMENT — PAIN DESCRIPTION - PAIN TYPE
TYPE: ACUTE PAIN

## 2021-06-30 ASSESSMENT — FIBROSIS 4 INDEX: FIB4 SCORE: 1.71

## 2021-06-30 NOTE — PROGRESS NOTES
Assumed care at 1845. Pt resting in bed. A&ox 4  Ambulating independently  O2 on RA  Tolerating diet  Voiding adequately  NPO at this time except with sips of thins.   Peg tube in place. Pt does bolus feedings independently  Oral care QID  Denies pain  Call light within reach. Hourly rounding in place

## 2021-06-30 NOTE — CARE PLAN
The patient is Stable - Low risk of patient condition declining or worsening    Shift Goals  Clinical Goals: tolerates sips of thins. oral care  Patient Goals: sleep and [facility]  Family Goals: na    Progress made toward(s) clinical / shift goals:  Patient able to take sips of thins with 1:1 supervision     Patient is not progressing towards the following goals:

## 2021-06-30 NOTE — DISCHARGE PLANNING
Anticipated Discharge Disposition: Home with Bolus Feeds.    Action: SHADE You with [facility] informed LSW, this patient is accepted and insurance authorization was received. Per Kenyatta, the Tube Feeds order was sent to the prescriber MD as co-signature is required. Kenyatta stated [facility] will deliver the Tube Feeds to this patient's room when the signed order is received. Kenyatta anticipated delivering the TF to patient's room either tonight or tomorrow morning.    Per Kenyatta, this patient reported his mother will pick him up from the hospital and transport him to his sister's home in [place], [place]. Kenyatta stated [facility] [place] will facilitate the transition to [facility], [place] once the patient is in [place]. LSW sent a message via [employer] to Faythe, Trauma A.P.R.N regarding the anticipated discharge date.    LSW met with patient and his mother to review his discharge plan. The patient's mother reported patient's brother will come from [place] to pick him on the day of discharge.    Barriers to Discharge: Bolus Feeds.    Plan: As Above.    4:00pm - Per Artur Sanchez RN, the formula was delivered to the patient's room. DPA called [facility] and confirmed [facility]ADE You with [employer] did the initial teaching earlier today. Kenyatta requested this patient is provided with a Carlos Valve prior to discharge, Artur Sanchez RN notified via Netbooks.

## 2021-06-30 NOTE — CARE PLAN
The patient is Stable - Low risk of patient condition declining or worsening    Shift Goals  Clinical Goals: tolerates sips of thins. oral care  Patient Goals: sleep  Family Goals: na    Progress made toward(s) clinical / shift goals:  supervision with thin liquids. Oral care QID    Patient is not progressing towards the following goals:      Problem: Psychosocial  Goal: Patient's ability to identify and develop effective coping behaviors will improve  Outcome: Progressing     Problem: Depression  Goal: Patient and family/caregiver will verbalize accurate information about at least two of the possible causes of depression, three-four of the signs and symptoms of depression  Outcome: Progressing

## 2021-06-30 NOTE — PROGRESS NOTES
Received report from SHADE Peace  Pt is AAOx4  GARCIA  VSS  Denies pain  Denies SOB  -N/V; tolerating bolus tube feeds via G tube. Pt able to do his own bolus feedings and free water flushes. Thin liquids with supervision.  4x per day oral care   +BS +Flatus +BM  Pt up self w/ steady gait  POC discussed  Bed locked and in the lowest position  Call light w/in reach  Hourly rounding in place

## 2021-06-30 NOTE — DISCHARGE PLANNING
Agency/Facility Name: [facility]  Spoke To: Kenyatta  Outcome: Kenyatta will call this Pt back in a few minutes regarding DME     Agency/Facility Name: [facility]   Spoke To: Kenyatta  Outcome: Per Kenyatta, the order that was signed is not valid because APRN's NPI is .  DPA gave Kenyatta F: [phone]    Agency/Facility Name: [facility]  Spoke To: Kenyatta   Outcome: Per Kenyatta, teaching was done, DME is delivered per LSW, and order must have been received if DME has been delivered.  Kenyatta states this Pt is ready on their end
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
## 2021-07-01 VITALS
HEIGHT: 71 IN | BODY MASS INDEX: 25.62 KG/M2 | HEART RATE: 63 BPM | OXYGEN SATURATION: 96 % | DIASTOLIC BLOOD PRESSURE: 69 MMHG | WEIGHT: 182.98 LBS | SYSTOLIC BLOOD PRESSURE: 110 MMHG | TEMPERATURE: 97.9 F | RESPIRATION RATE: 17 BRPM

## 2021-07-01 PROCEDURE — A9270 NON-COVERED ITEM OR SERVICE: HCPCS | Performed by: NURSE PRACTITIONER

## 2021-07-01 PROCEDURE — 700102 HCHG RX REV CODE 250 W/ 637 OVERRIDE(OP): Performed by: SURGERY

## 2021-07-01 PROCEDURE — A9270 NON-COVERED ITEM OR SERVICE: HCPCS | Performed by: SURGERY

## 2021-07-01 PROCEDURE — 700102 HCHG RX REV CODE 250 W/ 637 OVERRIDE(OP): Performed by: NURSE PRACTITIONER

## 2021-07-01 RX ORDER — CHLORHEXIDINE GLUCONATE ORAL RINSE 1.2 MG/ML
15 SOLUTION DENTAL 2 TIMES DAILY
Qty: 200 ML | Refills: 2 | Status: SHIPPED | OUTPATIENT
Start: 2021-07-01 | End: 2022-08-22

## 2021-07-01 RX ADMIN — CHLORHEXIDINE GLUCONATE 0.12% ORAL RINSE 15 ML: 1.2 LIQUID ORAL at 06:42

## 2021-07-01 RX ADMIN — ACETAMINOPHEN 650 MG: 325 TABLET, FILM COATED ORAL at 10:05

## 2021-07-01 ASSESSMENT — ENCOUNTER SYMPTOMS
HEADACHES: 0
DIZZINESS: 0
NECK PAIN: 0
BLURRED VISION: 1
MYALGIAS: 0
FEVER: 0
BACK PAIN: 0
SHORTNESS OF BREATH: 0
CONSTIPATION: 0
ABDOMINAL PAIN: 0
CHILLS: 0
DOUBLE VISION: 0
NAUSEA: 0

## 2021-07-01 ASSESSMENT — PAIN DESCRIPTION - PAIN TYPE
TYPE: ACUTE PAIN;CHRONIC PAIN
TYPE: ACUTE PAIN;CHRONIC PAIN
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
## 2021-07-01 NOTE — DISCHARGE INSTRUCTIONS
Discharge Instructions    Discharged to home by car with relative. Discharged via wheelchair, hospital escort: Yes.  Special equipment needed: Not Applicable    Be sure to schedule a follow-up appointment with your primary care doctor or any specialists as instructed.     Discharge Plan:   Diet Plan: Discussed  Activity Level: Discussed  Confirmed Follow up Appointment: Patient to Call and Schedule Appointment  Confirmed Symptoms Management: Discussed  Medication Reconciliation Updated: Yes    I understand that a diet low in cholesterol, fat, and sodium is recommended for good health. Unless I have been given specific instructions below for another diet, I accept this instruction as my diet prescription.   Other diet: Isosource 1.5 6 Cans per day, only thin liquids with SLP exercises.    Special Instructions: None    · Is patient discharged on Warfarin / Coumadin?   No     Depression / Suicide Risk    As you are discharged from this [facility] facility, it is important to learn how to keep safe from harming yourself.    Recognize the warning signs:  · Abrupt changes in personality, positive or negative- including increase in energy   · Giving away possessions  · Change in eating patterns- significant weight changes-  positive or negative  · Change in sleeping patterns- unable to sleep or sleeping all the time   · Unwillingness or inability to communicate  · Depression  · Unusual sadness, discouragement and loneliness  · Talk of wanting to die  · Neglect of personal appearance   · Rebelliousness- reckless behavior  · Withdrawal from people/activities they love  · Confusion- inability to concentrate     If you or a loved one observes any of these behaviors or has concerns about self-harm, here's what you can do:  · Talk about it- your feelings and reasons for harming yourself  · Remove any means that you might use to hurt yourself (examples: pills, rope, extension cords, firearm)  · Get professional help from the  community (Mental Health, Substance Abuse, psychological counseling)  · Do not be alone:Call your Safe Contact- someone whom you trust who will be there for you.  · Call your local CRISIS HOTLINE 647-2247 or 896-259-3138  · Call your local Children's Mobile Crisis Response Team [place] (562) 014-9766 or www.Coub  · Call the toll free National Suicide Prevention Hotlines   · National Suicide Prevention Lifeline 835-933-ZLXW (0660)  · [place] Line Network 800-SUICIDE (045-0633)          - Call or seek medical attention for questions or concerns  - Follow up with Dr. Cabrera in 2 weeks time, keep gastrostomy tube site clean and dry  - Follow up with Dr. Okeefe and/or Dr. Hill in 2 weeks time  - Follow up with Diana UGALDE at scheduled appointment on 7/29/2021  - Follow up with outpatient SLP for ongoing dysphagia therapy/treatment (order sent, they will call to schedule upon discharge)  - Follow up with outpatient psychiatry for ongoing therapy and substance abuse counseling  - Follow up with outpatient ophthalmology for an eye exam  - Follow up with  for new dentures  - Continue nothing by mouth and bolus tube feedings of 6 cans per day and free water flushes  - Continue good oral care  - May take over the counter acetaminophen as needed for pain  - No swimming, hot tubs, baths or wound submersion until cleared by outpatient provider. May shower  - No contact sports, strenuous activities, or heavy lifting until cleared by outpatient provider  - If respiratory decompensation, persistent or worsening pain, or signs or symptoms of infection occur seek medical attention

## 2021-07-01 NOTE — DISCHARGE PLANNING
Anticipated Discharge Disposition: Home with Bolus Feeds.    Action: This case was discussed today during Trauma Rounds. LSW informed the Bolus Feeds were delivered by [facility]. LSW also reported this patient reports he will discharge to Miah, brother-in-law home in [place], his phone number [phone].     Barriers to Discharge: None.    Plan: As Above.    2:00pm - LSW met with this patient, his mother, and Miah at bedside. The patient reported [facility] informed him the next shipment will be sent to the patient's friend Vadim in [address] [place] [address], [place] [address]. Miah reported the patient will be coming to [place] from [place] once a week for doctors appointments.    PLAN: As Above.
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
## 2021-07-01 NOTE — DISCHARGE PLANNING
Agency/Facility Name: [facility]  Outcome: Left Vmail regarding where the next delivery will be at    1304  Agency/Facility Name: [facility]  Outcome: Left Vmail regarding where the next delivery will be at     1330  Agency/Facility Name: [facility]  Spoke To: Giulia  Outcome: Per Giulia she does not have the information where the second delivery is going to and directed this DPA to a different number    1339  Agency/Facility Name: [facility]   Outcome: Left a Vmail regarding where the next delivery will be at

## 2021-07-01 NOTE — CARE PLAN
The patient is Stable - Low risk of patient condition declining or worsening    Shift Goals  Clinical Goals: discharge planning.  Patient Goals: Go outside to the [facility] with a staff member  Family Goals: discharge home    Progress made toward(s) clinical / shift goals:  Reviewed discharge plan with patient and family. Answered questions about tube feeding and necessary equipment for discharge. Reinforced how to give himself medications through the PEG tube.    Problem: Psychosocial  Goal: Patient's ability to identify and develop effective coping behaviors will improve  Outcome: Met  Goal: Patient's ability to identify and utilize available support systems will improve  Outcome: Met     Problem: Psychosocial  Goal: Patient's level of anxiety will decrease  Outcome: Met  Goal: Patient's ability to verbalize feelings about condition will improve  Outcome: Met  Goal: Patient's ability to re-evaluate and adapt role responsibilities will improve  Outcome: Met  Goal: Patient and family will demonstrate ability to cope with life altering diagnosis and/or procedure  Outcome: Met  Goal: Spiritual and cultural needs incorporated into hospitalization  Outcome: Met     Problem: Communication  Goal: The ability to communicate needs accurately and effectively will improve  Outcome: Met     Problem: Depression  Goal: Patient and family/caregiver will verbalize accurate information about at least two of the possible causes of depression, three-four of the signs and symptoms of depression  Outcome: Met

## 2021-07-01 NOTE — CARE PLAN
The patient is Stable - Low risk of patient condition declining or worsening    Shift Goals  Clinical Goals: discharge planning  Patient Goals: Go outside to the [facility] garden with a staff member  Family Goals: discharge home    Progress made toward(s) clinical / shift goals: pt expected to discharge vee. Supplies and equipment at bedside.     Patient is not progressing towards the following goals:      Problem: Psychosocial  Goal: Patient's ability to identify and develop effective coping behaviors will improve  Outcome: Progressing     Problem: Depression  Goal: Patient and family/caregiver will verbalize accurate information about at least two of the possible causes of depression, three-four of the signs and symptoms of depression  Outcome: Progressing
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
## 2021-07-13 ENCOUNTER — OFFICE VISIT (OUTPATIENT)
Dept: MEDICAL GROUP | Facility: MEDICAL CENTER | Age: 51
End: 2021-07-13
Attending: PHYSICIAN ASSISTANT
Payer: MEDICAID

## 2021-07-13 VITALS
HEART RATE: 83 BPM | HEIGHT: 72 IN | BODY MASS INDEX: 25.12 KG/M2 | DIASTOLIC BLOOD PRESSURE: 50 MMHG | RESPIRATION RATE: 17 BRPM | OXYGEN SATURATION: 92 % | TEMPERATURE: 97 F | SYSTOLIC BLOOD PRESSURE: 92 MMHG | WEIGHT: 185.5 LBS
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
## 2021-07-13 DIAGNOSIS — R13.10 DYSPHAGIA, UNSPECIFIED TYPE: ICD-10-CM

## 2021-07-13 DIAGNOSIS — S02.92XB: ICD-10-CM

## 2021-07-13 DIAGNOSIS — H53.8 BLURRY VISION, LEFT EYE: ICD-10-CM

## 2021-07-13 DIAGNOSIS — T14.90XA TRAUMA: ICD-10-CM

## 2021-07-13 DIAGNOSIS — F19.11 HISTORY OF SUBSTANCE ABUSE (HCC): ICD-10-CM

## 2021-07-13 PROCEDURE — 99203 OFFICE O/P NEW LOW 30 MIN: CPT | Performed by: PHYSICIAN ASSISTANT

## 2021-07-13 PROCEDURE — 99213 OFFICE O/P EST LOW 20 MIN: CPT | Performed by: PHYSICIAN ASSISTANT

## 2021-07-13 ASSESSMENT — FIBROSIS 4 INDEX: FIB4 SCORE: 1.71
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
## 2021-08-04 ENCOUNTER — SPEECH THERAPY (OUTPATIENT)
Dept: SPEECH THERAPY | Facility: REHABILITATION | Age: 51
End: 2021-08-04
Attending: NURSE PRACTITIONER
Payer: MEDICAID

## 2021-08-04 DIAGNOSIS — R13.12 OROPHARYNGEAL DYSPHAGIA: ICD-10-CM

## 2021-08-04 DIAGNOSIS — S01.83XA: ICD-10-CM

## 2021-08-04 DIAGNOSIS — S02.92XB: ICD-10-CM

## 2021-08-04 DIAGNOSIS — R47.1 DYSARTHRIA: ICD-10-CM

## 2021-08-04 PROCEDURE — 92610 EVALUATE SWALLOWING FUNCTION: CPT

## 2021-08-04 PROCEDURE — 92522 EVALUATE SPEECH PRODUCTION: CPT

## 2021-08-04 ASSESSMENT — SOCIAL DETERMINANTS OF HEALTH (SDOH): SOCIAL_SUPPORT_SYSTEM: OTHER

## 2021-08-04 ASSESSMENT — ENCOUNTER SYMPTOMS: NO PATIENT REPORTED PAIN: 1
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
## 2021-08-11 ENCOUNTER — SPEECH THERAPY (OUTPATIENT)
Dept: SPEECH THERAPY | Facility: REHABILITATION | Age: 51
End: 2021-08-11
Attending: NURSE PRACTITIONER
Payer: MEDICAID

## 2021-08-11 DIAGNOSIS — R13.12 OROPHARYNGEAL DYSPHAGIA: ICD-10-CM

## 2021-08-11 PROCEDURE — 92526 ORAL FUNCTION THERAPY: CPT

## 2021-08-11 ASSESSMENT — SOCIAL DETERMINANTS OF HEALTH (SDOH): SOCIAL_SUPPORT_SYSTEM: CAREGIVER

## 2021-08-11 NOTE — OP THERAPY DAILY TREATMENT
"  Outpatient Speech Therapy  DAILY TREATMENT     [address]  [address]  [place] [address]  Phone:  [phone]  Fax:  [phone]    Date: 08/11/2021    Patient: Greg Tovar  YOB: 1970  MRN: [id number]     Time Calculation    Start time: 1100  Stop time: 1145 Time Calculation (min): 45 minutes         Chief Complaint: Dysphagia    Visit #: 2    Subjective:   Social Support:     Accompanied By:  Caregiver (brother-in-law, present and supportive)    Patient Mental Status:  Alert and Responsive  Progress Factors:     Progression:  Getting Better  Additional Subjective Comments:      Patient endorses he has started swallowing one time per day, endorsing intake of 8 ounces of water per day. Patient endorses no throat clearing or cough with thin liquid water. Patient endorses completion of exercise addressing swallow through effortful swallow \"50-60 times a day.\"       Objective:   Treatments/Interventions Performed:  Patient/Caregiver education, Compensatory strategy training, Dysphagia treatment and Home program  Treatment Intervention tool(s) used:  SLP provided education and practice in swallow retraining exercise  Objective Details:  Swallow retraining exercise completed addressing oral phase of swallow through 10/10: oo-ee, cheek puff, oral opening through jaw stretch, yawn    Swallow retraining exercise 10/10: Effortful swallow, Mendelsohn maneuver, falsetto/ pitch glide /ee/, supraglottic swallow          Speech Therapy Assessment:     Oral Motor Status:     Labial strength and control for patient: Fair    Lingual strength and control for patient: Poor    Patient saliva management: FairPatient oral sensation and awareness: Fair    Patient awareness of swallow problem: Good    Oral motor status comments: Overall improvement in saliva management noted, although continued loss during sustained phonation or when leaning forward/ not paying attention " demonstrated. Trismus addressed through education in exercise to promote oral opening; although severe limitations in oral opening demonstrated.     Oral Phase Assessment:     Oral phase comments: Oral dysphagia present characterized by water left in oral cavity post swallow, increased difficulty in A-P bolus transit and reduced coordination oral-pharyngeal phases of swallow specific to initiation of pharyngeal swallow. Patient noted to demonstrate improved understanding of importance of lip closure/ seal prior to and during initiation of swallow.     Pharyngeal Phase Assessment:     Pharyngeal phase comments: Patient with need for multiple swallows (2-3) to achieve complete pharyngeal clearance with small, single sips of thin liquid; although patient noted to demonstrate no clinical signs of aspiration during therapist directed trials. 3 sips from cup observed.       Speech Therapy Plan :   Prognosis & Recommendations  Impression Summary: Patient returned for his first follow up visit of direct, outpatient speech therapy addressing severe oropharyngeal swallow deficits requiring alternative means of nutrition/ hydration.    Patient returns reporting improvement in swallow characterized by improved saliva management, initiation of thin liquid water swallows demonstrating no reports of change to overall medical condition. Patient provided with education regarding importance of oral care to facilitate safety in swallow given patient has progressed to more consistent PO intake of thin liquid water only. Patient verbalized understanding to all education provided.    Education and practice in completion of swallow retraining exercise addressing oral, pharyngeal phases of swallow completed with patient demonstrating good accuracy in completion of all exercise addressed.   Therapy Recommendations  Recommendation:  Individual Speech Therapy and Other, Given patient noted improvement in swallow and reports of feelings of  improvement in swallow and observations demonstrated during therapist directed trials completed as part of today's session, it is recommended further swallow evaluation through FEES to considered to determine advancement in PO   Planned Therapy Interventions:  Home Program, Compensatory Strategy Training, Patient/Caregiver Education and Dysphagia treatment,   Plan Details:  Continue with education and practice in swallow retraining exercise. Continue with therapist directed trials

## 2021-08-17 ENCOUNTER — PRE-ADMISSION TESTING (OUTPATIENT)
Dept: ADMISSIONS | Facility: MEDICAL CENTER | Age: 51
End: 2021-08-17
Attending: PLASTIC SURGERY
Payer: MEDICAID

## 2021-08-17 DIAGNOSIS — Z01.812 PRE-PROCEDURAL LABORATORY EXAMINATION: ICD-10-CM

## 2021-08-17 ASSESSMENT — FIBROSIS 4 INDEX: FIB4 SCORE: 1.71

## 2021-08-17 NOTE — DISCHARGE PLANNING
RN CM asked by nurseLavern, for resources for patient for mental health/depression. Resources given to nurse to give to patient.

## 2021-08-19 ENCOUNTER — HOSPITAL ENCOUNTER (OUTPATIENT)
Facility: MEDICAL CENTER | Age: 51
End: 2021-08-19
Attending: PLASTIC SURGERY | Admitting: PLASTIC SURGERY
Payer: MEDICAID

## 2021-08-19 ENCOUNTER — ANESTHESIA (OUTPATIENT)
Dept: SURGERY | Facility: MEDICAL CENTER | Age: 51
End: 2021-08-19
Payer: MEDICAID

## 2021-08-19 ENCOUNTER — ANESTHESIA EVENT (OUTPATIENT)
Dept: SURGERY | Facility: MEDICAL CENTER | Age: 51
End: 2021-08-19
Payer: MEDICAID

## 2021-08-19 VITALS
DIASTOLIC BLOOD PRESSURE: 87 MMHG | TEMPERATURE: 97.9 F | RESPIRATION RATE: 17 BRPM | SYSTOLIC BLOOD PRESSURE: 144 MMHG | BODY MASS INDEX: 23.89 KG/M2 | WEIGHT: 176.37 LBS | HEIGHT: 72 IN | HEART RATE: 67 BPM | OXYGEN SATURATION: 92 %

## 2021-08-19 PROCEDURE — 501838 HCHG SUTURE GENERAL: Performed by: PLASTIC SURGERY

## 2021-08-19 PROCEDURE — 160046 HCHG PACU - 1ST 60 MINS PHASE II: Performed by: PLASTIC SURGERY

## 2021-08-19 PROCEDURE — 160029 HCHG SURGERY MINUTES - 1ST 30 MINS LEVEL 4: Performed by: PLASTIC SURGERY

## 2021-08-19 PROCEDURE — 700105 HCHG RX REV CODE 258: Performed by: PLASTIC SURGERY

## 2021-08-19 PROCEDURE — 700101 HCHG RX REV CODE 250: Performed by: PLASTIC SURGERY

## 2021-08-19 PROCEDURE — 160002 HCHG RECOVERY MINUTES (STAT): Performed by: PLASTIC SURGERY

## 2021-08-19 PROCEDURE — 160009 HCHG ANES TIME/MIN: Performed by: PLASTIC SURGERY

## 2021-08-19 PROCEDURE — 160025 RECOVERY II MINUTES (STATS): Performed by: PLASTIC SURGERY

## 2021-08-19 PROCEDURE — A9270 NON-COVERED ITEM OR SERVICE: HCPCS | Performed by: ANESTHESIOLOGY

## 2021-08-19 PROCEDURE — 160048 HCHG OR STATISTICAL LEVEL 1-5: Performed by: PLASTIC SURGERY

## 2021-08-19 PROCEDURE — 700111 HCHG RX REV CODE 636 W/ 250 OVERRIDE (IP): Performed by: ANESTHESIOLOGY

## 2021-08-19 PROCEDURE — 160036 HCHG PACU - EA ADDL 30 MINS PHASE I: Performed by: PLASTIC SURGERY

## 2021-08-19 PROCEDURE — 700102 HCHG RX REV CODE 250 W/ 637 OVERRIDE(OP): Performed by: ANESTHESIOLOGY

## 2021-08-19 PROCEDURE — 160041 HCHG SURGERY MINUTES - EA ADDL 1 MIN LEVEL 4: Performed by: PLASTIC SURGERY

## 2021-08-19 PROCEDURE — L8610 OCULAR IMPLANT: HCPCS | Performed by: PLASTIC SURGERY

## 2021-08-19 PROCEDURE — 160035 HCHG PACU - 1ST 60 MINS PHASE I: Performed by: PLASTIC SURGERY

## 2021-08-19 PROCEDURE — 700101 HCHG RX REV CODE 250: Performed by: ANESTHESIOLOGY

## 2021-08-19 DEVICE — IMPLANT MEDPOR UT SHEET 38X50 - X0.85MM: Type: IMPLANTABLE DEVICE | Status: FUNCTIONAL

## 2021-08-19 RX ORDER — DEXAMETHASONE SODIUM PHOSPHATE 4 MG/ML
INJECTION, SOLUTION INTRA-ARTICULAR; INTRALESIONAL; INTRAMUSCULAR; INTRAVENOUS; SOFT TISSUE PRN
Status: DISCONTINUED | OUTPATIENT
Start: 2021-08-19 | End: 2021-08-19 | Stop reason: SURG

## 2021-08-19 RX ORDER — HYDROMORPHONE HYDROCHLORIDE 1 MG/ML
0.1 INJECTION, SOLUTION INTRAMUSCULAR; INTRAVENOUS; SUBCUTANEOUS
Status: DISCONTINUED | OUTPATIENT
Start: 2021-08-19 | End: 2021-08-19 | Stop reason: HOSPADM

## 2021-08-19 RX ORDER — OXYCODONE HCL 5 MG/5 ML
5 SOLUTION, ORAL ORAL
Status: COMPLETED | OUTPATIENT
Start: 2021-08-19 | End: 2021-08-19

## 2021-08-19 RX ORDER — BUPIVACAINE HYDROCHLORIDE AND EPINEPHRINE 5; 5 MG/ML; UG/ML
INJECTION, SOLUTION EPIDURAL; INTRACAUDAL; PERINEURAL
Status: DISCONTINUED | OUTPATIENT
Start: 2021-08-19 | End: 2021-08-19 | Stop reason: HOSPADM

## 2021-08-19 RX ORDER — EPINEPHRINE 1 MG/ML(1)
AMPUL (ML) INJECTION
Status: DISCONTINUED
Start: 2021-08-19 | End: 2021-08-19 | Stop reason: HOSPADM

## 2021-08-19 RX ORDER — HYDROMORPHONE HYDROCHLORIDE 1 MG/ML
0.4 INJECTION, SOLUTION INTRAMUSCULAR; INTRAVENOUS; SUBCUTANEOUS
Status: DISCONTINUED | OUTPATIENT
Start: 2021-08-19 | End: 2021-08-19 | Stop reason: HOSPADM

## 2021-08-19 RX ORDER — MIDAZOLAM HYDROCHLORIDE 1 MG/ML
INJECTION INTRAMUSCULAR; INTRAVENOUS PRN
Status: DISCONTINUED | OUTPATIENT
Start: 2021-08-19 | End: 2021-08-19 | Stop reason: SURG

## 2021-08-19 RX ORDER — LIDOCAINE HYDROCHLORIDE 40 MG/ML
SOLUTION TOPICAL PRN
Status: DISCONTINUED | OUTPATIENT
Start: 2021-08-19 | End: 2021-08-19 | Stop reason: SURG

## 2021-08-19 RX ORDER — OXYCODONE HCL 5 MG/5 ML
10 SOLUTION, ORAL ORAL
Status: COMPLETED | OUTPATIENT
Start: 2021-08-19 | End: 2021-08-19

## 2021-08-19 RX ORDER — BUPIVACAINE HYDROCHLORIDE 2.5 MG/ML
INJECTION, SOLUTION EPIDURAL; INFILTRATION; INTRACAUDAL
Status: DISCONTINUED
Start: 2021-08-19 | End: 2021-08-19 | Stop reason: HOSPADM

## 2021-08-19 RX ORDER — CEFAZOLIN SODIUM 1 G/3ML
INJECTION, POWDER, FOR SOLUTION INTRAMUSCULAR; INTRAVENOUS PRN
Status: DISCONTINUED | OUTPATIENT
Start: 2021-08-19 | End: 2021-08-19 | Stop reason: SURG

## 2021-08-19 RX ORDER — NEOMYCIN SULFATE, POLYMYXIN B SULFATE, BACITRACIN ZINC, HYDROCORTISONE 3.5; 10000; 400; 1 MG/G; [USP'U]/G; [USP'U]/G; MG/G
OINTMENT OPHTHALMIC
Status: DISCONTINUED
Start: 2021-08-19 | End: 2021-08-19 | Stop reason: HOSPADM

## 2021-08-19 RX ORDER — SUCCINYLCHOLINE CHLORIDE 20 MG/ML
INJECTION INTRAMUSCULAR; INTRAVENOUS PRN
Status: DISCONTINUED | OUTPATIENT
Start: 2021-08-19 | End: 2021-08-19 | Stop reason: SURG

## 2021-08-19 RX ORDER — HYDROMORPHONE HYDROCHLORIDE 1 MG/ML
0.2 INJECTION, SOLUTION INTRAMUSCULAR; INTRAVENOUS; SUBCUTANEOUS
Status: DISCONTINUED | OUTPATIENT
Start: 2021-08-19 | End: 2021-08-19 | Stop reason: HOSPADM

## 2021-08-19 RX ORDER — SODIUM CHLORIDE, SODIUM LACTATE, POTASSIUM CHLORIDE, CALCIUM CHLORIDE 600; 310; 30; 20 MG/100ML; MG/100ML; MG/100ML; MG/100ML
INJECTION, SOLUTION INTRAVENOUS CONTINUOUS
Status: DISCONTINUED | OUTPATIENT
Start: 2021-08-19 | End: 2021-08-19 | Stop reason: HOSPADM

## 2021-08-19 RX ORDER — LIDOCAINE HYDROCHLORIDE 20 MG/ML
INJECTION, SOLUTION EPIDURAL; INFILTRATION; INTRACAUDAL; PERINEURAL PRN
Status: DISCONTINUED | OUTPATIENT
Start: 2021-08-19 | End: 2021-08-19 | Stop reason: SURG

## 2021-08-19 RX ORDER — ACETAMINOPHEN 500 MG
1000 TABLET ORAL ONCE
Status: COMPLETED | OUTPATIENT
Start: 2021-08-19 | End: 2021-08-19

## 2021-08-19 RX ORDER — ONDANSETRON 2 MG/ML
4 INJECTION INTRAMUSCULAR; INTRAVENOUS
Status: COMPLETED | OUTPATIENT
Start: 2021-08-19 | End: 2021-08-19

## 2021-08-19 RX ORDER — CELECOXIB 200 MG/1
200 CAPSULE ORAL ONCE
Status: COMPLETED | OUTPATIENT
Start: 2021-08-19 | End: 2021-08-19

## 2021-08-19 RX ORDER — DIPHENHYDRAMINE HYDROCHLORIDE 50 MG/ML
12.5 INJECTION INTRAMUSCULAR; INTRAVENOUS
Status: DISCONTINUED | OUTPATIENT
Start: 2021-08-19 | End: 2021-08-19 | Stop reason: HOSPADM

## 2021-08-19 RX ORDER — HALOPERIDOL 5 MG/ML
1 INJECTION INTRAMUSCULAR
Status: DISCONTINUED | OUTPATIENT
Start: 2021-08-19 | End: 2021-08-19 | Stop reason: HOSPADM

## 2021-08-19 RX ORDER — ROCURONIUM BROMIDE 10 MG/ML
INJECTION, SOLUTION INTRAVENOUS PRN
Status: DISCONTINUED | OUTPATIENT
Start: 2021-08-19 | End: 2021-08-19 | Stop reason: SURG

## 2021-08-19 RX ORDER — HYDRALAZINE HYDROCHLORIDE 20 MG/ML
5 INJECTION INTRAMUSCULAR; INTRAVENOUS
Status: DISCONTINUED | OUTPATIENT
Start: 2021-08-19 | End: 2021-08-19 | Stop reason: HOSPADM

## 2021-08-19 RX ADMIN — ACETAMINOPHEN 1000 MG: 500 TABLET ORAL at 15:38

## 2021-08-19 RX ADMIN — PROPOFOL 20 MG: 10 INJECTION, EMULSION INTRAVENOUS at 16:17

## 2021-08-19 RX ADMIN — MIDAZOLAM HYDROCHLORIDE 2 MG: 1 INJECTION, SOLUTION INTRAMUSCULAR; INTRAVENOUS at 15:56

## 2021-08-19 RX ADMIN — SUCCINYLCHOLINE CHLORIDE 100 MG: 20 INJECTION, SOLUTION INTRAMUSCULAR; INTRAVENOUS; PARENTERAL at 15:59

## 2021-08-19 RX ADMIN — CELECOXIB 200 MG: 200 CAPSULE ORAL at 15:38

## 2021-08-19 RX ADMIN — ONDANSETRON 4 MG: 2 INJECTION INTRAMUSCULAR; INTRAVENOUS at 17:42

## 2021-08-19 RX ADMIN — LIDOCAINE HYDROCHLORIDE 75 MG: 20 INJECTION, SOLUTION EPIDURAL; INFILTRATION; INTRACAUDAL at 15:59

## 2021-08-19 RX ADMIN — CEFAZOLIN 2 G: 330 INJECTION, POWDER, FOR SOLUTION INTRAMUSCULAR; INTRAVENOUS at 16:09

## 2021-08-19 RX ADMIN — FENTANYL CITRATE 75 MCG: 50 INJECTION, SOLUTION INTRAMUSCULAR; INTRAVENOUS at 16:17

## 2021-08-19 RX ADMIN — SODIUM CHLORIDE, POTASSIUM CHLORIDE, SODIUM LACTATE AND CALCIUM CHLORIDE: 600; 310; 30; 20 INJECTION, SOLUTION INTRAVENOUS at 15:38

## 2021-08-19 RX ADMIN — PROPOFOL 150 MG: 10 INJECTION, EMULSION INTRAVENOUS at 15:59

## 2021-08-19 RX ADMIN — OXYCODONE HYDROCHLORIDE 10 MG: 5 SOLUTION ORAL at 17:42

## 2021-08-19 RX ADMIN — DEXAMETHASONE SODIUM PHOSPHATE 8 MG: 4 INJECTION, SOLUTION INTRA-ARTICULAR; INTRALESIONAL; INTRAMUSCULAR; INTRAVENOUS; SOFT TISSUE at 16:28

## 2021-08-19 RX ADMIN — FENTANYL CITRATE 25 MCG: 50 INJECTION, SOLUTION INTRAMUSCULAR; INTRAVENOUS at 15:57

## 2021-08-19 RX ADMIN — ROCURONIUM BROMIDE 3 MG: 10 INJECTION, SOLUTION INTRAVENOUS at 15:59

## 2021-08-19 RX ADMIN — PROPOFOL 15 MG: 10 INJECTION, EMULSION INTRAVENOUS at 16:19

## 2021-08-19 RX ADMIN — LIDOCAINE HYDROCHLORIDE 2 ML: 40 SOLUTION TOPICAL at 15:57

## 2021-08-19 ASSESSMENT — PAIN DESCRIPTION - PAIN TYPE
TYPE: SURGICAL PAIN
TYPE: SURGICAL PAIN

## 2021-08-19 ASSESSMENT — FIBROSIS 4 INDEX: FIB4 SCORE: 1.71
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
## 2021-08-19 NOTE — ANESTHESIA PROCEDURE NOTES
Airway    Date/Time: 8/19/2021 4:00 PM  Performed by: Mo Roque M.D.  Authorized by: Mo Roque M.D.     Location:  OR  Urgency:  Elective  Indications for Airway Management:  Anesthesia      Spontaneous Ventilation: absent    Sedation Level:  Deep  Preoxygenated: Yes    Patient Position:  Sniffing  Final Airway Type:  Endotracheal airway  Final Endotracheal Airway:  ETT  Cuffed: Yes    Technique Used for Successful ETT Placement:  Direct laryngoscopy    Insertion Site:  Oral  Blade Type:  Glide  Laryngoscope Blade/Videolaryngoscope Blade Size:  3  ETT Size (mm):  7.0  Measured from:  Teeth  ETT to Teeth (cm):  22  Placement Verified by: auscultation and capnometry    Cormack-Lehane Classification:  Grade I - full view of glottis  Number of Attempts at Approach:  1   Sedation, locat , awake look with glidescope

## 2021-08-20 NOTE — DISCHARGE INSTRUCTIONS
ACTIVITY: Rest and take it easy for the first 24 hours.  A responsible adult is recommended to remain with you during that time.  It is normal to feel sleepy.  We encourage you to not do anything that requires balance, judgment or coordination.    MILD FLU-LIKE SYMPTOMS ARE NORMAL. YOU MAY EXPERIENCE GENERALIZED MUSCLE ACHES, THROAT IRRITATION, HEADACHE AND/OR SOME NAUSEA.    FOR 24 HOURS DO NOT:  Drive, operate machinery or run household appliances.  Drink beer or alcoholic beverages.   Make important decisions or sign legal documents.      DIET: To avoid nausea, slowly advance diet as tolerated, avoiding spicy or greasy foods for the first day.  Add more substantial food to your diet according to your physician's instructions.  Babies can be fed formula or breast milk as soon as they are hungry.  INCREASE FLUIDS AND FIBER TO AVOID CONSTIPATION.    SURGICAL DRESSING/BATHING: Follow Dr. Okeefe's instructions.   Do not submerge wound by bathing, or swimming.  OK to shower tomorrow.     FOLLOW-UP APPOINTMENT:  A follow-up appointment should be arranged with your doctor in ONE WEEK; call to schedule [phone].    You should CALL YOUR PHYSICIAN if you develop:  Fever greater than 101 degrees F.  Pain not relieved by medication, or persistent nausea or vomiting.  Excessive bleeding (blood soaking through dressing) or unexpected drainage from the wound.  Extreme redness or swelling around the incision site, drainage of pus or foul smelling drainage.  Inability to urinate or empty your bladder within 8 hours.  Problems with breathing or chest pain.    You should call 911 if you develop problems with breathing or chest pain.  If you are unable to contact your doctor or surgical center, you should go to the nearest emergency room or urgent care center.  Physician's telephone #: [phone]    If any questions arise, call your doctor.  If your doctor is not available, please feel free to call the Surgical Center at  [phone]. The Contact Center is open Monday through Friday 7AM to 5PM and may speak to a nurse at [phone], or toll free at [phone].     A registered nurse may call you a few days after your surgery to see how you are doing after your procedure.    MEDICATIONS: Resume taking daily medication.  Take prescribed pain medication with food.  If no medication is prescribed, you may take non-aspirin pain medication if needed.  PAIN MEDICATION CAN BE VERY CONSTIPATING.  Take a stool softener or laxative such as senokot, pericolace, or milk of magnesia if needed.      If your physician has prescribed pain medication that includes Acetaminophen (Tylenol), do not take additional Acetaminophen (Tylenol) while taking the prescribed medication.    Depression / Suicide Risk    As you are discharged from this [facility] facility, it is important to learn how to keep safe from harming yourself.    Recognize the warning signs:  · Abrupt changes in personality, positive or negative- including increase in energy   · Giving away possessions  · Change in eating patterns- significant weight changes-  positive or negative  · Change in sleeping patterns- unable to sleep or sleeping all the time   · Unwillingness or inability to communicate  · Depression  · Unusual sadness, discouragement and loneliness  · Talk of wanting to die  · Neglect of personal appearance   · Rebelliousness- reckless behavior  · Withdrawal from people/activities they love  · Confusion- inability to concentrate     If you or a loved one observes any of these behaviors or has concerns about self-harm, here's what you can do:  · Talk about it- your feelings and reasons for harming yourself  · Remove any means that you might use to hurt yourself (examples: pills, rope, extension cords, firearm)  · Get professional help from the community (Mental Health, Substance Abuse, psychological counseling)  · Do not be alone:Call your Safe Contact- someone whom  you trust who will be there for you.  · Call your local CRISIS HOTLINE 407-5599 or 250-946-3576  · Call your local Children's Mobile Crisis Response Team [place] (870) 396-6200 or www.[place]  · Call the toll free National Suicide Prevention Hotlines   · National Suicide Prevention Lifeline 901-169-JXVF (4750)  · National [employer] 800-SUICIDE (492-6679)

## 2021-08-20 NOTE — OR NURSING
1648 Pt arrived to PACU, report received.  On 4L mask.  Patient has sutures under L eye, and sutures to top of L eyelid.  Covered in ointment, SHAISTA.  Patient has no complaints of pain.     1712 Patient transitioned to RA.     1725 Discharge instructions discussed with patient's sister, Ingrid.  All questions answered.      1742 Pt c/o pain, medicated per MAR    1800 phase 2 criteria met.     1822 patient dressed, IV d/c and discharged home via wheelchair.
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
## 2021-08-20 NOTE — ANESTHESIA POSTPROCEDURE EVALUATION
Patient: Akil Tovar    Procedure Summary     Date: 08/19/21 Room / Location: [facility] ROOM 24 / SURGERY SAME DAY [facility]    Anesthesia Start: 1553 Anesthesia Stop: 1650    Procedure: ORIF, FRACTURE, ORBIT - ENOPHTHALMOS. (Left Face) Diagnosis: (ENOPTHALMOS)    Surgeons: Moises Okeefe Jr., M.D. Responsible Provider: Mo Roque M.D.    Anesthesia Type: general ASA Status: 2          Final Anesthesia Type: general  Last vitals  BP   Blood Pressure: 144/87    Temp   36.6 °C (97.9 °F)    Pulse   67   Resp   17    SpO2   92 %      Anesthesia Post Evaluation    Patient location during evaluation: PACU  Patient participation: complete - patient participated  Level of consciousness: awake and alert    Airway patency: patent  Anesthetic complications: no  Cardiovascular status: hemodynamically stable  Respiratory status: acceptable  Hydration status: euvolemic    PONV: none          No complications documented.     Nurse Pain Score: 4 (NPRS)

## 2021-08-25 ENCOUNTER — SPEECH THERAPY (OUTPATIENT)
Dept: SPEECH THERAPY | Facility: REHABILITATION | Age: 51
End: 2021-08-25
Attending: NURSE PRACTITIONER
Payer: MEDICAID

## 2021-08-25 DIAGNOSIS — R13.12 OROPHARYNGEAL DYSPHAGIA: ICD-10-CM

## 2021-08-25 PROCEDURE — 92526 ORAL FUNCTION THERAPY: CPT

## 2021-08-25 ASSESSMENT — SOCIAL DETERMINANTS OF HEALTH (SDOH): SOCIAL_SUPPORT_SYSTEM: CAREGIVER

## 2021-08-25 NOTE — OP THERAPY DAILY TREATMENT
Outpatient Speech Therapy  DAILY TREATMENT     [address]  [address]  [place] [address]  Phone:  [phone]  Fax:  [phone]    Date: 08/25/2021    Patient: Greg Tovar  YOB: 1970  MRN: [id number]     Time Calculation    Start time: 1100  Stop time: 1145 Time Calculation (min): 45 minutes         Chief Complaint: Dysphagia (trismus)    Visit #: 3    Subjective:   Social Support:     Accompanied By:  Caregiver (brother-in-law, present and supportive)    Patient Mental Status:  Alert and Responsive  Progress Factors:     Progression:  Getting Better  Additional Subjective Comments:      Patient returns to outpatient speech therapy, reporting completion of recent surgery addressing left cheek bone. Patient endorses initially following surgery he experienced light sensitivity and just rested. Patient endorses he was back to completion of exercise, and swallow controlled amounts of water yesterday.     Patient endorses he feels like he is increasing the amount he is swallowing, endorsing less anterior spillage with swallow.       Objective:   Treatments/Interventions Performed:  Patient/Caregiver education, Compensatory strategy training, Dysphagia treatment and Home program  Other Treatment Interventions:  Education in anatomical changes demonstrated during formal evaluation of of swallow (MBS) and impact on oral phase of swallow  Treatment Intervention tool(s) used:  SLP provided education and practice in swallow retraining exercise  Objective Details:  Swallow retraining exercise completed addressing oral phase of swallow through 10/10: oo-ee, cheek puff, oral opening through jaw stretch, yawn, rotary jaw movement     Swallow retraining exercise 10/10: Effortful swallow, Mendelsohn maneuver with hold to 5 seconds, falsetto/ pitch glide /ee/, supraglottic swallow (hold-swallow twice). CTAR 10 seconds hold/ 10 second rest 10 times.          Speech Therapy  Assessment:     Oral Motor Status:     Oral motor status comments: With oral opening, patient continues to demonstrate opening consistent with severe trismus. Overall jaw movement remains severe limited during exercise addressing oral opening, jaw movement. Improved saliva management demonstrated to an independent level, characterized by improved saliva loss anteriorly, still requires a towel to assist in management.       Speech Therapy Plan :   Prognosis & Recommendations  Impression Summary:  Patient seen for ongoing, direct outpatient speech therapy addressing severe oropharyngeal swallow deficits requiring alternative means of nutrition/ hydration.    Exercise completed addressing oral, pharyngeal swallow deficits with patient demonstrating an overall improvement in exercise addressing pharyngeal phase of swallow. Limitations to oral phase of swallow remain characterized by presence of trismus and anatomical changes impacting lips, tongue, movement of bolus through nasopharynx.     Given questions regarding oral opening, patient and brother-in-law encouraged to seek additional support through physician regarding questions regarding limitations of oral opening and prognosis.   Therapy Recommendations  Recommendation:  Individual Speech Therapy,  Planned Therapy Interventions:  Home Program, Compensatory Strategy Training, Patient/Caregiver Education and Dysphagia treatment,   Plan Details:  Continue with education and practice in swallow retraining exercise. Continue with therapist directed trials. Assess effectiveness of use of syringe to address oral dysphagia

## 2021-09-01 ENCOUNTER — SPEECH THERAPY (OUTPATIENT)
Dept: SPEECH THERAPY | Facility: REHABILITATION | Age: 51
End: 2021-09-01
Attending: NURSE PRACTITIONER
Payer: MEDICAID

## 2021-09-01 DIAGNOSIS — R47.1 DYSARTHRIA: ICD-10-CM

## 2021-09-01 DIAGNOSIS — R13.12 OROPHARYNGEAL DYSPHAGIA: ICD-10-CM

## 2021-09-01 PROCEDURE — 92526 ORAL FUNCTION THERAPY: CPT

## 2021-09-01 NOTE — OP THERAPY DAILY TREATMENT
"  Outpatient Speech Therapy  DAILY TREATMENT     [facility] Speech [address]  [address]  [place] [address]  Phone:  [phone]  Fax:  [phone]    Date: 09/01/2021    Patient: Greg Tovar  YOB: 1970  MRN: [id number]     Time Calculation    Start time: 1100  Stop time: 1153 Time Calculation (min): 53 minutes         Chief Complaint: Dysphagia    Visit #: 4    Subjective:   Social Support:     Patient Mental Status:  Alert and Responsive  Progress Factors:     Progression:  Getting Better  Additional Subjective Comments:      Patient endorses he is taking every opportunity to speak and swallow at this time stating \"even if I have to talk to the cat, I'm doing it.\" Patient endorses he is beginning to transition to more independence, but states it feels like the transition is slow. Patient endorses intake of 1/2 cup of cold water a day \"because it feels so good\". Patient endorses no changes in medical history since his previous outpatient speech therapy appointment.       Objective:   Treatments/Interventions Performed:  Patient/Caregiver education, Compensatory strategy training, Home program and Dysphagia treatment  Other Treatment Interventions:  Education in use of swabsticks for completion of oral hygiene  Treatment Intervention tool(s) used:  SLP provided education and practice in swallow retraining exercise  Objective Details:  Swallow retraining exercise completed addressing oral phase of swallow through 5/5: cheek puff, oral opening through jaw stretch, yawn, rotary jaw movement     Swallow retraining exercise 5/5: Effortful swallow, Mendelsohn maneuver with hold to 5 seconds, falsetto/ pitch glide /ee/, supraglottic swallow (hold-swallow twice). CTAR 10 seconds hold/ 10 second rest 5 times.             Speech Therapy Assessment:     Oral Motor Status:     Labial strength and control for patient: Good    Lingual strength and control for patient: Poor    Patient " "saliva management: Fair    Oral motor status comments: Oral care completed with focus on cleaning entire oral cavity. Patient endorses he continues to struggle with drooling, but endorses \"its not there all the time.\" Patient endorses initiation of swallow as necessary to address presence of drool, but continues to necessitate use of towel when swallowing does not complete alleviate presence of drool.     Oral Phase Assessment:     Types of food with anterior spillage: Thin Liquid    Oral phase comments: Patient noted to demonstrate a small amount of water from nares during swallow, suggesting significant deficits in movement of the thin liquid swallow through the nasopharynx during swallow. Severe anterior spillage of remaining thin liquid water/ saliva demonstrated, inconsistent from oral cavity following multiple attempts of swallow to clear; most noted when patient was leaning forward following swallow.     Pharyngeal Phase Assessment:     Laryngeal    Reduced laryngeal elevation: Thin Liquid    Coughing after the swallow present: Thin Liquid (1/4 (25%) of total cup swallows)    Pharyngeal phase comments: Patient with need for 5 swallows to clear single sips of thin liquid water from oral, pharyngeal cavities. Minimal throat clear/ cough present 50% of trials; patient noted to demonstrate strong, productive cough when present.       Speech Therapy Plan :   Prognosis & Recommendations  Impression Summary:  Patient seen for ongoing, direct outpatient speech therapy addressing severe oropharyngeal swallow deficits requiring alternative means of nutrition/ hydration.     Education and direct instruction completed regarding use of swabsticks to complete strict oral hygiene/ oral care as recommended. Education and demonstration of modification of cup completed to assist with patient reports of increased difficulty in sips from cup given noted anatomical changes to mouth/ oral cavity.    Patient demonstrated excellent " response to swallow exercise, demonstrating improved accuracy, coordination and strength. Patient was noted to demonstrate improved tolerance for thin liquid water swallows during therapist directed trials; although moderate-severe oral phase swallow deficits were noted to likely continue to impact pharyngeal phase of swallow and overall safety in swallow at this time.   Therapy Recommendations  Recommendation:  Individual Speech Therapy,  Planned Therapy Interventions:  Home Program, Compensatory Strategy Training, Patient/Caregiver Education and Dysphagia treatment,   Plan Details:   Continue with education and practice in swallow retraining exercise. Continue with therapist directed trials. Assess effectiveness of use of syringe to address oral dysphagia (not present during today's session)

## 2021-09-08 ENCOUNTER — SPEECH THERAPY (OUTPATIENT)
Dept: SPEECH THERAPY | Facility: REHABILITATION | Age: 51
End: 2021-09-08
Attending: NURSE PRACTITIONER
Payer: MEDICAID

## 2021-09-08 DIAGNOSIS — R47.1 DYSARTHRIA: ICD-10-CM

## 2021-09-08 DIAGNOSIS — R13.12 OROPHARYNGEAL DYSPHAGIA: ICD-10-CM

## 2021-09-08 PROCEDURE — 92526 ORAL FUNCTION THERAPY: CPT

## 2021-09-08 NOTE — OP THERAPY DAILY TREATMENT
"  Outpatient Speech Therapy  DAILY TREATMENT     [facility] Speech [address].  [address]  [place] [address]  Phone:  [phone]  Fax:  [phone]    Date: 09/08/2021    Patient: Greg Tovar  YOB: 1970  MRN: [id number]     Time Calculation    Start time: 1100  Stop time: 1145 Time Calculation (min): 45 minutes         Chief Complaint: Dysphagia (\"its still getting better\")    Visit #: 5    Subjective:   Social Support:     Patient Mental Status:  Alert and Responsive  Progress Factors:     Progression:  Getting Better  Additional Subjective Comments:      Patient is now attending outpatient speech therapy appointments independently. Patient endorses that he continues to experience improvement in swallow function characterized by improved swallowing, less drooling \"a lot less than a couple of months ago\". Patient continues to have loss of liquid to intraoral space, endorsing use of head tilt and patience in waiting for liquid to clear. Patient endorses that intake remains limited to water, endorsing \"occasional cough\", endorsing he is \"able to tell when liquid goes down the wrong pipe\" initiating cough to clear as necessary.       Objective:   Treatments/Interventions Performed:  Patient/Caregiver education, Dysphagia treatment, Compensatory strategy training and Home program  Treatment Intervention tool(s) used:  SLP provided education and practice in swallow retraining exercise  Objective Details:  Swallow retraining exercise completed addressing oral motor coordination, symmetry to 5/5: smile, grin. Oral phase of swallow through 5/5: cheek puff, oral opening through jaw stretch, yawn, rotary jaw movement     Swallow retraining exercise 5/5: Effortful swallow, Mendelsohn maneuver with hold to 5 seconds, falsetto/ pitch glide /ee/, supraglottic swallow (hold-swallow twice). CTAR 10 seconds hold/ 10 second rest 5 times.      ST targeted posterior bolus placement using straw " with small, controlled swallows of mildly thick liquids.          Speech Therapy Assessment:     Oral Motor Status:     Labial strength and control for patient: Good    Patient saliva management: Fair (continued loss of saliva from oral cavity when leaning forward or not paying attention; improved with attention)Patient oral sensation and awareness: Fair (sensation now reported to midline)    Patient awareness of swallow problem: Good    Oral motor status comments: Patient endorses that education in oral care completed during previous session was helpful, endorsing he no longer has residue present in oral cavity as demonstrated during oral care as completed at previous session.     Improved oral opening suggested with presence of swabstick during oral care. Current informal measure is 1 digit to area of lateral incisor, 2 digits to gums.   Patient reporting increased sensation to left side with increased motor response demonstrated to left cheek, left labial during structured oral motor practice.     Oral Phase Assessment:     Types of food with anterior spillage: Thin Liquid    Oral phase comments: With cup swallows, patient noted to demonstrate severe anterior loss of bolus from oral cavity. Patient with noted benefit from presentation of bolus in controlled amounts through stir stick straw presentation to posterior tongue, resulting in improved oral bolus formation and limited anterior loss as compared to cup swallows. With all swallows, patient with consistent use of chin up posture to promote anterior-posterior bolus movement.     Pharyngeal Phase Assessment:     Coughing after the swallow present: Thin Liquid    Pharyngeal phase comments: Single episode of immediate and delayed cough demonstrated with thin liquid swallows. No episodes of cough or throat clear demonstrated with controlled bolus of mildly thick liquids.      Speech Therapy Plan :   Prognosis & Recommendations  Impression Summary:  Patient seen  for ongoing, direct outpatient speech therapy addressing severe oropharyngeal swallow deficits requiring alternative means of nutrition/ hydration.    Patient with independent improvement in oral care as educated during previous session. Based on performance during therapist directed trials, patient will likely benefit from external assistance in bolus control given anatomical changes impacting oral bolus formation. Patient demonstrated improvement in oral opening, sensation and motor control to left side including cheek, lips during oral motor exercise. Patient also with good accuracy in completion of swallow exercise as recommended as part of home program.    Prognosis:  Good  Prognosis Details:  Patient has made consistent, documented improvement in swallow function through participation in direct, outpatient speech therapy. Continued improvement is anticipated, given patient motivation, compliance with home program and progress demonstrated at this time.   Therapy Recommendations  Recommendation: Individual Speech Therapy and Modified Barium Swallow Study, Given noted improvement in swallow, patient would likely benefit from formal evaluation of swallow to support advancement of intake beyond current level of thin liquid water only trials  Planned Therapy Interventions:  Home Program, Patient/Caregiver Education, Compensatory Strategy Training and Dysphagia treatment,   Plan Details:  Continue with education and practice in swallow retraining exercise. Continue with therapist directed trials. Assess effectiveness of use of syringe to address oral dysphagia (not present during today's session)

## 2021-09-15 ENCOUNTER — APPOINTMENT (OUTPATIENT)
Dept: SPEECH THERAPY | Facility: REHABILITATION | Age: 51
End: 2021-09-15
Attending: NURSE PRACTITIONER
Payer: MEDICAID

## 2021-09-22 ENCOUNTER — SPEECH THERAPY (OUTPATIENT)
Dept: SPEECH THERAPY | Facility: REHABILITATION | Age: 51
End: 2021-09-22
Attending: NURSE PRACTITIONER
Payer: MEDICAID

## 2021-09-22 DIAGNOSIS — R13.12 OROPHARYNGEAL DYSPHAGIA: ICD-10-CM

## 2021-09-22 PROCEDURE — 92526 ORAL FUNCTION THERAPY: CPT

## 2021-09-22 NOTE — OP THERAPY DAILY TREATMENT
"  Outpatient Speech Therapy  DAILY TREATMENT     [facility] Speech [address].  [address]  [place] [address]  Phone:  [phone]  Fax:  [phone]    Date: 09/22/2021    Patient: Greg Tovar  YOB: 1970  MRN: [id number]     Time Calculation    Start time: 1310  Stop time: 1350 Time Calculation (min): 40 minutes         Chief Complaint: Dysphagia    Visit #: 6    Subjective:   Social Support:     Patient Mental Status:  Alert and Responsive  Progress Factors:     Progression:  Getting Better  Additional Subjective Comments:      Patient endorses he has transitioned to using a syringe, which patient reports \"feels nice\" and provides assistance with swallow given severity of oral phase of swallow deficits.       Objective:   Treatments/Interventions Performed:  Patient/Caregiver education, Dysphagia treatment, Home program and Compensatory strategy training  Other Treatment Interventions:  Therapist directed trials in thin water and mildly thick liquids with use of syringe  Objective Details:  Oral opening targeted to: jaw stretch, rotary jaw chew, yawn    Pharyngeal phase of swallow 10/10: effortful swallow, mendelsohn exercise, breath hold + swallow         Speech Therapy Assessment:     Oral Motor Status:     Labial strength and control for patient: Good    Lingual strength and control for patient: Poor    Patient saliva management: FairPatient oral sensation and awareness: Fair    Oral motor status comments: Patient endorses oral sensation to right cheek, present but reduced sensation to palate limited to midline and right side. Oral opening measured to 2 fingers (with extra space noted), improved, but remains consistent with trismus. Weakness to left labial corner resulting in slight pooling of saliva to left labial corner.     Oral Phase Assessment:     Types of food with anterior spillage: Thin Liquid    Oral phase comments: Use of syringe targeted with focus on patient " awareness regarding how to use right side sensation and stregtnh of lip closure to address deficit sin tongue closure at this time.     Pharyngeal Phase Assessment:     Delayed Swallow    Food types within functional limits: Thin Liquid    Coughing after the swallow present: Nectar Liquid (mildly thick liquids)    Pharyngeal phase comments: Despite need for multiple swallows to clear bolus pharyngeally, patient demonstrated no episodes of throat clear or cough with thin liquids. Intermittent cough present following the swallow with mildly thick liquids. Following trials, patient was noted to demonstrate severe anterior saliva loss from the oral cavity. Education provided on expectoration following cessation of intake to promote complete oral clearance and reduce risk of aspiration post swallow.        Speech Therapy Plan :   Prognosis & Recommendations  Impression Summary:   Patient seen for ongoing, direct outpatient speech therapy addressing severe oropharyngeal swallow deficits requiring alternative means of nutrition/ hydration.     Patient returned to outpatient speech therapy reporting use of syringe has been helpful in assisting in swallow given severity of oral phase swallow deficits. Education provided on how to safely use syringe to introduce both thin and mildly thick liquids into the oral cavity. Education and practice in compensatory swallow strategies provided. Patient noted to demonstrate severe-profound anterior loss of saliva/ bolus post swallow suggesting patient continues to demonstrate pocketing to anterior mandibular space.   Patient Stated Goal:  My goal is to get back to my previous life as close as I can  Therapy Recommendations  Recommendation:  Individual Speech Therapy,  Planned Therapy Interventions:  Home Program, Patient/Caregiver Education, Compensatory Strategy Training, Voice training and Speech/Language training,   Plan Details:  Continue with education and practice in swallow  retraining exercise. Continue with therapist directed trials. Assess effectiveness of use of syringe addressing oral dysphagia. Further assess how to promote complete clearance of oral cavity following swallow.

## 2021-10-01 ENCOUNTER — SPEECH THERAPY (OUTPATIENT)
Dept: SPEECH THERAPY | Facility: REHABILITATION | Age: 51
End: 2021-10-01
Attending: PHYSICIAN ASSISTANT
Payer: MEDICAID

## 2021-10-01 DIAGNOSIS — R13.12 OROPHARYNGEAL DYSPHAGIA: ICD-10-CM

## 2021-10-01 DIAGNOSIS — R47.1 DYSARTHRIA: ICD-10-CM

## 2021-10-01 PROCEDURE — 92526 ORAL FUNCTION THERAPY: CPT

## 2021-10-01 NOTE — OP THERAPY DAILY TREATMENT
"  Outpatient Speech Therapy  DAILY TREATMENT     [facility] Speech [address].  [address]  [place] [address]  Phone:  [phone]  Fax:  [phone]    Date: 10/01/2021    Patient: Greg Tovar  YOB: 1970  MRN: [id number]     Time Calculation    Start time: 1105  Stop time: 1145 Time Calculation (min): 40 minutes         Chief Complaint: Dysphagia    Visit #: 7    Subjective:   Reason for Therapy:     Reason For Evaluation:  Dysphagia  Progress Factors:     Progression:  Getting Better  Therapy History:     Current Diet:  Peg-Tube, Tube Feedings and NPO    Nutritional Concerns Restrictions and Allergies:  Patient continues with trials of thin liquid water following FFWP.   Additional Subjective Comments:      Patient endorses he is scheduled to meet with dentist on Monday (10/4) for further discussion regarding dentures. Patient endorses he is doing well with his swallow with use of a syringe. Patient endorses cough \"no more than usual, which isn't that much.\" Patient continues to consume a cup of water (16 ounces) throughout the day.       Objective:   Treatments/Interventions Performed:  Patient/Caregiver education, Dysphagia treatment, Home program and Compensatory strategy training  Other Treatment Interventions:  Therapist directed trials of mildly thick and thin liquid water with use of syringe  Treatment Intervention tool(s) used:  SLP provided education and practice in swallow retraining exercise  Objective Details:  Swallow retraining exercise completed addressing oral motor coordination, symmetry to 5/5: smile, grin. Oral phase of swallow through 5/5: cheek puff, oral opening through jaw stretch, yawn, rotary jaw movement         Speech Therapy Assessment:     Speech Mechanism Assessment:     Patient exhibits conversational intelligibility: Minimal  Speech mechanism comments: Speech strategies to improve intelligibility educated and practice specific to 1) " over-articulation, 2) separate each word. Patient encouraged in swallow before speech to address frequent anterior loss of saliva with initiation of speech production.     Oral Motor Status:     Labial strength and control for patient: Fair    Lingual strength and control for patient: Fair    Patient saliva management: FairPatient oral sensation and awareness: Fair (patient endorses orientation of syringe to right side with bolus presentation in helpful)    Oral motor status comments: Oral opening measures to: 25 mm, demonstrating a significant improvement in oral opening; however, opening remains consistent with trismus at this time.     Oral care targeted following completion of therapist directed trials to address presence of drool following trials.     Oral Phase Assessment:     Oral phase comments: Syringe presentation used with all thin and mildly thick liquid swallows.     Pharyngeal Phase Assessment:     Delayed Swallow    Food types within functional limits: Nectar Liquid and Thin Liquid    Pharyngeal phase comments: No clinical signs of aspiration were demonstrated with both mildly thick and thin liquids when presented in controlled amounts through use of syringe.       Speech Therapy Plan :   Prognosis & Recommendations  Impression Summary:  Patient continues to demonstrate progress in his oral, pharyngeal phases of swallow. Modifications to introduction of bolus remain necessary (use of syringe) in the setting of anatomical changes resulting in severe deficits in oral phase of swallow. Patient was encouraged in his current progression, continuation of proactive swallow exercise and trials of both thin and mildly thick textures recommended. Importance of consistent oral care following intake in the presence of severe anterior loss with all trials recommended.   Compensatory swallow strategies:  Upright position 90 degrees during meal and 45 minutes after meal, Reduce bolus size, Multiple swallows and  Place food in right side (squeeze lips tight together, breath hold - swallow)  Therapy Recommendations  Recommendation: Individual Speech Therapy and Modified Barium Swallow Study, Patient will be moving forward with recommendation for formal evaluation of swallow through MBS  Planned Therapy Interventions:  Patient/Caregiver Education, Home Program, Dysphagia treatment and Compensatory Strategy Training,   Plan Details:  Continue with education and practice in swallow retraining exercise. Continue with therapist directed trials. Assess effectiveness of use of syringe addressing oral dysphagia. Further assess how to promote complete clearance of oral cavity following swallow.

## 2021-10-04 DIAGNOSIS — R13.10 DYSPHAGIA, UNSPECIFIED TYPE: ICD-10-CM

## 2021-10-07 ENCOUNTER — SPEECH THERAPY (OUTPATIENT)
Dept: SPEECH THERAPY | Facility: REHABILITATION | Age: 51
End: 2021-10-07
Attending: PHYSICIAN ASSISTANT
Payer: MEDICAID

## 2021-10-07 DIAGNOSIS — R13.12 OROPHARYNGEAL DYSPHAGIA: ICD-10-CM

## 2021-10-07 PROCEDURE — 92507 TX SP LANG VOICE COMM INDIV: CPT

## 2021-10-07 PROCEDURE — 92526 ORAL FUNCTION THERAPY: CPT

## 2021-10-07 NOTE — OP THERAPY DAILY TREATMENT
"  Outpatient Speech Therapy  DAILY TREATMENT     [facility] Speech [address].  [address]  [place] [address]  Phone:  [phone]  Fax:  [phone]    Date: 10/07/2021    Patient: Greg Tovar  YOB: 1970  MRN: [id number]     Time Calculation    Start time: 1020  Stop time: 1103 Time Calculation (min): 43 minutes         Chief Complaint: Not Understood By Others and Dysphagia    Visit #: 8    Subjective:   Reason for Therapy:     Reason For Evaluation:  Dysphagia and Speech/Language  Social Support:     Patient Mental Status:  Alert and Responsive  Additional Subjective Comments:      Swallowing getting better. Verbalized to \"Slow down\" and over articulate for improved intelligibility.      Objective:   Treatments/Interventions Performed:  Dysphagia treatment, Home program, Speech/Language treatment, Patient/Caregiver education and Compensatory strategy training  Objective Details:  Cues to slow down for discourse.   Puree and thin by straw nasal occlusion assisted with improved velar closure and ability to clear oropharyngeal cavities. Head tilt right with tilt back with solids.         Speech Therapy Assessment:     Speech Mechanism Assessment:     Patient voice description: Clear    Patient's oral movements are voluntary and coordination: Severe    Patient speaks fluently: Moderate    Patient exhibits articulatory precision: Moderate    Patient exhibits single word intelligibility: Minimal    Patient exhibits sentence level intelligibility: Moderate    Patient exhibits conversational intelligibility: Severe    Oral Motor Status:     Labial strength and control for patient: Good    Lingual strength and control for patient: Poor    Patient saliva management: Poor (unable to feel on left side of face and drools constantly)    Oral Phase Assessment:     Patient tongue thrust (A-P movement): Puree and Thin Liquids    Patient stasis and residue: Puree and Thin Liquid    Liquid " wash to clear residue: Puree    Pharyngeal Phase Assessment:     Delayed Swallow    Food types within functional limits: Puree and Thin Liquid    Laryngeal    Reduced laryngeal elevation: Puree and Thin Liquid    Pharyngeal phase comments: Required multiple swallows to clear oropharyngeal residue.      Speech Therapy Plan :   Prognosis & Recommendations  Impression Summary:  Patient with improved control of bolus wit head tilt to right.  Continues to take effort for AP movements and clearing out more dense viscosities.  Not sing speech strategies consistently which severely impacts ineligibility and ability to get needs met.  Prognosis Details:  Progressing towards goals, continues to require skilled need  Compensatory swallow strategies:  Place food in right side, Alternate liquids/solids, Finger/tongue sweep to clear pocketing, Reduce bolus size, Upright position 90 degrees during meal and 45 minutes after meal and No talking while eating  Diet Recommendation:  Pureed Foods  Liquid Recommendation:  Thin (with straw)  Medication Administration:  Use PEG tube  Goals  Short Term Goals:  1. Patient will improve oral bolus management of saliva as evidenced by improvement in anterior loss of saliva from severe to minimal, implementing compensatory swallow strategies to an independent level within 8 weeks.  2. Patient will achieve complete laryngeal vestibular closure to achieve airway protection completing structured swallow exercise with 80% accuracy, 4/5 trials.  3. Patient will swallow 5mL of thin liquid water (IDDSI Level 0) without clinical indicators of aspiration or need to expectorate on 8 out of 10 attempts with moderate cues.  4. Patient will implement a super-supraglottic swallow to achieve laryngeal vestibular closure prior to the swallow and efficiently expectorate residue remaining in the laryngeal vestibule post-swallow   5. Patient will improve accuracy of speech production implementing compensatory  speech strategies on 8 out of 10 trials, 4/5 outpatient speech therapy sessions to improve intelligibility to 85% during production of novel phrase, sentence level productions.   Potential barriers to Goal Achievement:  None  Therapy Recommendations  Recommendation:  Dysphagia Treatment and Individual Speech Therapy,       Planned Therapy Interventions:  Patient/Caregiver Education, Compensatory Strategy Training, Home Program, Dysphagia treatment and Speech/Language training,   Plan Details:  PO trials, speech exercises with strategies

## 2021-10-15 ENCOUNTER — SPEECH THERAPY (OUTPATIENT)
Dept: SPEECH THERAPY | Facility: REHABILITATION | Age: 51
End: 2021-10-15
Attending: PHYSICIAN ASSISTANT
Payer: MEDICAID

## 2021-10-15 DIAGNOSIS — R47.1 DYSARTHRIA: ICD-10-CM

## 2021-10-15 DIAGNOSIS — R13.12 OROPHARYNGEAL DYSPHAGIA: ICD-10-CM

## 2021-10-15 PROCEDURE — 92507 TX SP LANG VOICE COMM INDIV: CPT | Performed by: SPEECH-LANGUAGE PATHOLOGIST

## 2021-10-15 PROCEDURE — 92526 ORAL FUNCTION THERAPY: CPT | Performed by: SPEECH-LANGUAGE PATHOLOGIST

## 2021-10-15 NOTE — OP THERAPY DAILY TREATMENT
Outpatient Speech Therapy  DAILY TREATMENT     [facility] Speech [address].  [address]  [place] [address]  Phone:  [phone]  Fax:  [phone]    Date: 10/15/2021    Patient: Greg Tovar  YOB: 1970  MRN: [id number]     Time Calculation    Start time: 1100              Chief Complaint: Speech Problem and Difficulty Swallowing    Visit #: 9    Subjective:   Progress Factors:     Progression:  Getting Better  Additional Subjective Comments:      Patient arrived to therapy on time and reported that he has noticed he is able to manage saliva better recently.       Objective:   Treatment Intervention tool(s) used:  SLP provided verbal cues for swallow exercises.     SLP provided PO trials of current diet (puree/thin) to assess least restrictive diet.     SLP provided verbal cues for clear speech strategies at the sentence level.   Objective Details:  Dysphagia: Patient completed supraglottic swallow with saliva (10/10), thin via straw (5/5), and puree via quarter tsp (5/5). Patient is noted with anterior spillage with thin liquids and poor labial control with puree, resulting in residue on right lower lip. Patient with reduced sensation but able to clear residue with a cloth. Patient was without overt s/sx of aspiration for all PO trials.     Speech: Patient required moderate cues for clear speech strategies (over-articulation, loudness, rate). He produced simple sentences to describe pictures with approximately % intelligibility, when given cues for speech strategies. Intelligibility is greatly reduced in conversation (approximately 50-60%).              Speech Therapy Assessment:     Speech Mechanism Assessment:     Patient voice description: Clear    Patient exhibits articulatory precision: Moderate    Patient exhibits single word intelligibility: Minimal    Patient exhibits sentence level intelligibility: Minimal    Patient exhibits conversational intelligibility:  Moderate  Speech mechanism comments: Patient requires verbal cues for speech strategies. When using speech strategies, intelligibility increases from approximately 50% to 90% accuracy.     Oral Phase Assessment:     Types of food with anterior spillage: Puree and Thin Liquid    Patient tongue thrust (A-P movement): Puree and Thin Liquids    Oral phase comments: Patient noted with poor bolus formation with all PO trials. Head tilt to the right was effective to improve bolus control.     Pharyngeal Phase Assessment:     Delayed swallow seconds (Puree): 3 seconds.    Delayed swallow seconds (Thin Liquids): 3 seconds.    Laryngeal    Reduced laryngeal elevation: Puree and Thin Liquid    Pharyngeal phase comments: Patient noted with delayed swallow upon visualization of hyolaryngeal elevation. Patient without overt s/sx of aspiration for puree/thin when alternating consistencies.       Speech Therapy Plan :   Prognosis & Recommendations  Impression Summary:  Patient was seen this date for ongoing speech therapy to target dysphagia and speech intelligibility. Patient is noted as motivated and eager to work with ST. SLP provided PO trials of current diet and verbal cues for swallow exercises. Patient completed independently and was without overt s/sx of asp for all PO trials. SLP also provided patient with verbal cues for clear speech strategies. Patient's intelligibility is greatly improved with the use of reduced rate, over-articulation and increased volume.   Prognosis:  Good  Compensatory swallow strategies:  Multiple swallows, Turn head Right/Left, Place food in right side, Reduce bolus size and Upright position 90 degrees during meal and 45 minutes after meal  Diet Recommendation:  Pureed Foods  Liquid Recommendation:  Thin  Goals  Short Term Goals: 1. Patient will improve oral bolus management of saliva as evidenced by improvement in anterior loss of saliva from severe to minimal, implementing compensatory swallow  strategies to an independent level within 8 weeks.  2. Patient will achieve complete laryngeal vestibular closure to achieve airway protection completing structured swallow exercise with 80% accuracy, 4/5 trials.  3. Patient will swallow 5mL of thin liquid water (IDDSI Level 0) without clinical indicators of aspiration or need to expectorate on 8 out of 10 attempts with moderate cues.  4. Patient will implement a super-supraglottic swallow to achieve laryngeal vestibular closure prior to the swallow and efficiently expectorate residue remaining in the laryngeal vestibule post-swallow   5. Patient will improve accuracy of speech production implementing compensatory speech strategies on 8 out of 10 trials, 4/5 outpatient speech therapy sessions to improve intelligibility to 85% during production of novel phrase, sentence level productions.  Patient progression on Short Term Goals:  Patient is making progress in all areas  Planned Therapy Interventions:  Swallow Dysfunction treatment, Speech/Language training and Home Program,   Plan Comments  Additional comments:  SLP discussed completing swallow exercises at home as well.

## 2021-10-19 ENCOUNTER — HOSPITAL ENCOUNTER (OUTPATIENT)
Dept: RADIOLOGY | Facility: MEDICAL CENTER | Age: 51
End: 2021-10-19
Attending: PHYSICIAN ASSISTANT
Payer: MEDICAID

## 2021-10-19 DIAGNOSIS — R13.10 DYSPHAGIA, UNSPECIFIED TYPE: ICD-10-CM

## 2021-10-19 DIAGNOSIS — R13.12 OROPHARYNGEAL DYSPHAGIA: ICD-10-CM

## 2021-10-19 PROCEDURE — 92611 MOTION FLUOROSCOPY/SWALLOW: CPT

## 2021-10-19 PROCEDURE — 74230 X-RAY XM SWLNG FUNCJ C+: CPT
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
## 2021-10-20 ENCOUNTER — TELEPHONE (OUTPATIENT)
Dept: MEDICAL GROUP | Facility: MEDICAL CENTER | Age: 51
End: 2021-10-20

## 2021-10-20 ENCOUNTER — SPEECH THERAPY (OUTPATIENT)
Dept: SPEECH THERAPY | Facility: REHABILITATION | Age: 51
End: 2021-10-20
Attending: PHYSICIAN ASSISTANT
Payer: MEDICAID
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
## 2021-10-20 DIAGNOSIS — R13.12 OROPHARYNGEAL DYSPHAGIA: ICD-10-CM

## 2021-10-20 DIAGNOSIS — R13.10 DYSPHAGIA, UNSPECIFIED TYPE: ICD-10-CM

## 2021-10-20 DIAGNOSIS — R47.1 DYSARTHRIA: ICD-10-CM

## 2021-10-20 DIAGNOSIS — S01.83XA: ICD-10-CM

## 2021-10-20 PROCEDURE — 92526 ORAL FUNCTION THERAPY: CPT

## 2021-10-20 NOTE — OP THERAPY DAILY TREATMENT
Outpatient Speech Therapy  DAILY TREATMENT     [address]  [address]  [place] [address]  Phone:  [phone]  Fax:  [phone]    Date: 10/20/2021    Patient: Greg Tovar  YOB: 1970  MRN: [id number]     Time Calculation    Start time: 1500  Stop time: 1544 Time Calculation (min): 44 minutes         Chief Complaint: Speech Problem and Difficulty Swallowing    Visit #: 10    Subjective:   Reason for Therapy:     Reason For Evaluation:  Dysphagia and Speech/Language  Social Support:     Patient Mental Status:  Responsive and Alert  Additional Subjective Comments:      Patient with no updates      Objective:   Treatments/Interventions Performed:  Home program, Dysphagia treatment, Patient/Caregiver education and Compensatory strategy training  Objective Details:  Intake with use of syringe of thin liquids had anterior spillage.  Intake was large and with small intake no spillage occurred.  Education provided on aspiration and need for closed system.   Delayed cough intermittently with thin liquids.  Puree with cues to place bolus as far posterior on right lingual base of tongue as able.  Uses liquids to assist with clearance.  SP provided min cues for sequencing breath, nasal occlusion, and swallow.  Visual aid provided for education on silent aspiration concerns and sniffing after occlusion.    Educated on other viscosities to consume that are puree.  Reviewed oral care and benefits in prevention of aspiration.         Speech Therapy Assessment:     Oral Motor Status:     Previous modified barium swallow: Yes    Date of previous modified barium swallow: 10/19/2021    Oral motor status comments: Recommendations:  Main source by PEG, begin introducing     Diet: Puree (IDDSI L 4)     Liquid: Thin (IDDSI L 0)                                          Medications:   PEG     Compensatory Strategies:   siting up at 90 degrees, small bites/drinks, alternate liquids  and solids, breath hold with nasal occlusion, head tilt right and back with pursed lips, avoid problematic foods, and no talking.      Pharyngeal Phase Assessment:     Laryngeal    Reduced laryngeal elevation: Puree and Thin Liquid    Delayed Cough    Delayed coughing seconds (Puree): 5 seconds.    Delayed coughing seconds (Thin Liquids): 5 seconds.      Speech Therapy Plan :   Prognosis & Recommendations  Impression Summary:  Education provided and received for silent aspiration and sequencing stages of strategies to reduce aspiration events/possibilitites.  Prognosis Details:  Progressing towards goals, continues to require skilled need  Compensatory swallow strategies:  Place food in right side, Alternate liquids/solids, Finger/tongue sweep to clear pocketing, Reduce bolus size, Upright position 90 degrees during meal and 45 minutes after meal and No talking while eating (head tilt right and back)  Diet Recommendation:  Pureed Foods  Liquid Recommendation:  Thin (with straw)  Medication Administration:  Use PEG tube  Goals  Short Term Goals:  1. Patient will improve oral bolus management of saliva as evidenced by improvement in anterior loss of saliva from severe to minimal, implementing compensatory swallow strategies to an independent level within 8 weeks.  2. Patient will achieve complete laryngeal vestibular closure to achieve airway protection completing structured swallow exercise with 80% accuracy, 4/5 trials.  3. Patient will swallow 5mL of thin liquid water (IDDSI Level 0) without clinical indicators of aspiration or need to expectorate on 8 out of 10 attempts with moderate cues.  4. Patient will implement a super-supraglottic swallow to achieve laryngeal vestibular closure prior to the swallow and efficiently expectorate residue remaining in the laryngeal vestibule post-swallow   5. Patient will improve accuracy of speech production implementing compensatory speech strategies on 8 out of 10 trials, 4/5  outpatient speech therapy sessions to improve intelligibility to 85% during production of novel phrase, sentence level productions.   Potential barriers to Goal Achievement:  None  Therapy Recommendations  Recommendation:  Dysphagia Treatment and Individual Speech Therapy,       Planned Therapy Interventions:  Patient/Caregiver Education, Compensatory Strategy Training, Home Program, Dysphagia treatment and Speech/Language training,   Plan Details:  Speech exercises with strategies

## 2021-10-20 NOTE — TELEPHONE ENCOUNTER
SPEECH-LANGUAGE PATHOLOGY DEPARMENT    Progress note: scanned on      Requesting: the speech department is requesting a new referral for patient.

## 2021-10-28 ENCOUNTER — SPEECH THERAPY (OUTPATIENT)
Dept: SPEECH THERAPY | Facility: REHABILITATION | Age: 51
End: 2021-10-28
Attending: PHYSICIAN ASSISTANT
Payer: MEDICAID

## 2021-10-28 DIAGNOSIS — R47.1 DYSARTHRIA: ICD-10-CM

## 2021-10-28 DIAGNOSIS — S01.83XA: ICD-10-CM

## 2021-10-28 DIAGNOSIS — R13.12 OROPHARYNGEAL DYSPHAGIA: ICD-10-CM

## 2021-10-28 PROCEDURE — 92507 TX SP LANG VOICE COMM INDIV: CPT

## 2021-10-28 NOTE — OP THERAPY DAILY TREATMENT
"  Outpatient Speech Therapy  DAILY TREATMENT     [facility] Speech [address].  [address]  [place] [address]  Phone:  [phone]  Fax:  [phone]    Date: 10/28/2021    Patient: Greg Tovar  YOB: 1970  MRN: [id number]     Time Calculation    Start time: 1107  Stop time: 1142 Time Calculation (min): 35 minutes         Chief Complaint: Difficulty Swallowing and Speech Problem    Visit #: 11    Subjective:   Reason for Therapy:     Reason For Evaluation:  Dysarthria and Dysphagia  Social Support:     Patient Mental Status:  Alert and Responsive  Progress Factors:     Progression:  Getting Better  Additional Subjective Comments:      Patient with trials of liquids with significant improved ability for intake without coughing.  Trying to \"swallow like used to.\"  Haven't been able to trial puree, but planning to do so this week.  Completed speech tasks often and completing use of strategies. Aware that when over articulating and slowing down, people can understand better.       Objective:   Treatments/Interventions Performed:  Dysphagia treatment, Home program, Patient/Caregiver education and Compensatory strategy training  Objective Details:  Sustained \"ah\" with and without nasal occlusion for 18-20 seconds.    Completed cheek puff with no complications with use of natalie to compress lips together.  Otherwise unable to create seal.  Agility tasks with sounds with % accuracy with slower and increased rates.  Articulation of phrases and sentences with model % accuracy.  Reading sentences 30-50% intelligible. Max cues to use strategies.  Verbalized strategies for PO intake independently.         Speech Therapy Assessment:     Speech Mechanism Assessment:     Patient voice description: Clear    Laterality of patient facial weakness: Left    Patient exhibits articulatory precision: Moderate    Patient exhibits single word intelligibility: Supervision Required    Patient " exhibits sentence level intelligibility: Moderate    Patient uses adequate breath support: Yes    Patient breath rate: Fast    Oral Motor Status:     Labial strength and control for patient: Fair    Lingual strength and control for patient: Fair    Patient saliva management: PoorPatient oral sensation and awareness: Poor    Patient awareness of swallow problem: Good      Speech Therapy Plan :   Prognosis & Recommendations  Impression Summary:  Continued reinforcement for use of strategies in verbal expression due to significant impaired articulatory abilities with ROM and strength.  Provided more HP for agility.  Prognosis Details:  Progressing towards goals, continues to require skilled need  Compensatory swallow strategies:  Place food in right side, Alternate liquids/solids, Finger/tongue sweep to clear pocketing, Reduce bolus size, Upright position 90 degrees during meal and 45 minutes after meal and No talking while eating (head tilt right and back)  Diet Recommendation:  Pureed Foods  Liquid Recommendation:  Thin (with straw)  Medication Administration:  Use PEG tube  Goals  Short Term Goals:  1. Patient will improve oral bolus management of saliva as evidenced by improvement in anterior loss of saliva from severe to minimal, implementing compensatory swallow strategies to an independent level within 8 weeks.  2. Patient will achieve complete laryngeal vestibular closure to achieve airway protection completing structured swallow exercise with 80% accuracy, 4/5 trials.  3. Patient will swallow 5mL of thin liquid water (IDDSI Level 0) without clinical indicators of aspiration or need to expectorate on 8 out of 10 attempts with moderate cues.  4. Patient will implement a super-supraglottic swallow to achieve laryngeal vestibular closure prior to the swallow and efficiently expectorate residue remaining in the laryngeal vestibule post-swallow   5. Patient will improve accuracy of speech production implementing  compensatory speech strategies on 8 out of 10 trials, 4/5 outpatient speech therapy sessions to improve intelligibility to 85% during production of novel phrase, sentence level productions.   Potential barriers to Goal Achievement:  None  Therapy Recommendations  Recommendation:  Dysphagia Treatment and Individual Speech Therapy,       Planned Therapy Interventions:  Patient/Caregiver Education, Compensatory Strategy Training, Home Program, Dysphagia treatment and Speech/Language training,   Plan Details:  Speech/swallow

## 2021-11-02 ENCOUNTER — APPOINTMENT (OUTPATIENT)
Dept: SPEECH THERAPY | Facility: REHABILITATION | Age: 51
End: 2021-11-02
Attending: PHYSICIAN ASSISTANT
Payer: MEDICAID

## 2021-11-02 NOTE — OP THERAPY DAILY TREATMENT
Outpatient Speech Therapy  DAILY TREATMENT     [facility] Speech [address].  [address]  [place] [address]  Phone:  [phone]  Fax:  [phone]    Date: 11/02/2021    Patient: Greg Tovar  YOB: 1970  MRN: [id number]     Time Calculation                   Chief Complaint: No chief complaint on file.    Visit #: 12    Subjective Evaluation    Objective:   Treatments/Interventions Performed:  Dysphagia treatment, Home program, Speech/Language treatment, Patient/Caregiver education, Compensatory strategy training and Thermal/Tactile stimulation  Objective Details:  Speech/swallow         Assessments    Speech Therapy Plan :   Prognosis Details:  Progressing towards goals, continues to require skilled need  Compensatory swallow strategies:  Place food in right side, Alternate liquids/solids, Finger/tongue sweep to clear pocketing, Reduce bolus size, Upright position 90 degrees during meal and 45 minutes after meal and No talking while eating (head tilt right and back)  Diet Recommendation:  Pureed Foods  Liquid Recommendation:  Thin (with straw)  Medication Administration:  Use PEG tube  Goals  Short Term Goals:  1. Patient will improve oral bolus management of saliva as evidenced by improvement in anterior loss of saliva from severe to minimal, implementing compensatory swallow strategies to an independent level within 8 weeks.  2. Patient will achieve complete laryngeal vestibular closure to achieve airway protection completing structured swallow exercise with 80% accuracy, 4/5 trials.  3. Patient will swallow 5mL of thin liquid water (IDDSI Level 0) without clinical indicators of aspiration or need to expectorate on 8 out of 10 attempts with moderate cues.  4. Patient will implement a super-supraglottic swallow to achieve laryngeal vestibular closure prior to the swallow and efficiently expectorate residue remaining in the laryngeal vestibule post-swallow   5. Patient  will improve accuracy of speech production implementing compensatory speech strategies on 8 out of 10 trials, 4/5 outpatient speech therapy sessions to improve intelligibility to 85% during production of novel phrase, sentence level productions.   Potential barriers to Goal Achievement:  None  Therapy Recommendations  Recommendation:  Dysphagia Treatment and Individual Speech Therapy,       Planned Therapy Interventions:  Patient/Caregiver Education, Compensatory Strategy Training, Home Program, Dysphagia treatment and Speech/Language training,
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
## 2021-11-17 ENCOUNTER — SPEECH THERAPY (OUTPATIENT)
Dept: SPEECH THERAPY | Facility: REHABILITATION | Age: 51
End: 2021-11-17
Attending: NURSE PRACTITIONER
Payer: MEDICAID

## 2021-11-17 DIAGNOSIS — R47.1 DYSARTHRIA: ICD-10-CM

## 2021-11-17 DIAGNOSIS — R13.12 OROPHARYNGEAL DYSPHAGIA: ICD-10-CM

## 2021-11-17 DIAGNOSIS — S01.83XA: ICD-10-CM

## 2021-11-17 PROCEDURE — 92526 ORAL FUNCTION THERAPY: CPT

## 2021-11-17 PROCEDURE — 92507 TX SP LANG VOICE COMM INDIV: CPT

## 2021-11-17 NOTE — OP THERAPY DAILY TREATMENT
"  Outpatient Speech Therapy  DAILY TREATMENT     [facility] Speech [address]  [address]  [place] [address]  Phone:  [phone]  Fax:  [phone]    Date: 11/17/2021    Patient: Greg Tovar  YOB: 1970  MRN: [id number]     Time Calculation    Start time: 1501  Stop time: 1543 Time Calculation (min): 42 minutes         Chief Complaint: Slurred Speech and Dysphagia    Visit #: 12    Subjective:   Reason for Therapy:     Reason For Evaluation:  Dysphagia, Dysarthria and Speech/Language  Social Support:     Patient Mental Status:  Alert and Responsive  Additional Subjective Comments:      Patient reported that doing better with drinking with syringe and less anterior spillage.   Changing rag every 3-4 hours instead of every hour due to less drooling.  Neutral head position more frequently with PO intake.  Saw dentist for dentures and awaiting ins auth. Not having as much puree due to moving from [place].   Completing HEP. More difficulty with /g/ than /k/.      Objective:   Treatments/Interventions Performed:  Dysphagia treatment, Home program, Speech/Language treatment, Patient/Caregiver education and Compensatory strategy training  Objective Details:  Structured speech strategies \"to take time\" and SLP to cue for over articulation and slower rate for % intelligible.   Educated on /g/ with why he has limitations with lingual base of tongue retraction.  Initial, medial, and word ending productions varied with coarticulation and accuracy of productions.  Intake thin by straw and puree observed. Slight anterior spillage and slight oral residue with puree.  Unable to sense required cues to swallow again.         Speech Therapy Assessment:     Speech Mechanism Assessment:     Patient exhibits articulatory precision: Moderate (varies)    Patient exhibits single word intelligibility: Minimal    Patient exhibits sentence level intelligibility: Moderate    Nasality is " minimal    Hyponasality is minimal.    Patient uses adequate breath support: Yes    Patient breath rate: Fast    Oral Motor Status:     Patient saliva management: Fair    Patient awareness of swallow problem: Good    Oral Phase Assessment:     Types of food within functional limits: Thin Liquid    Patient stasis and residue: Puree (mild)    Liquid wash to clear residue: Puree    Pharyngeal Phase Assessment:     Delayed Swallow    Food types within functional limits: Puree and Thin Liquid      Speech Therapy Plan :   Prognosis & Recommendations  Impression Summary:  Patient agreed to attempt to have more trials of puree for practice. Will completed more verbalizations for HEP to increase accuracy with speech.  Prognosis Details:  Progressing towards goals, continues to require skilled need.  Compensatory swallow strategies:  Multiple swallows, Upright position 90 degrees during meal and 45 minutes after meal, Reduce bolus size, Alternate liquids/solids, Place Food in left side and No talking while eating  Diet Recommendation:  Pureed Foods  Liquid Recommendation:  Thin  Medication Administration:  Float/crush  Goals  Short Term Goals:  - Patient will improve oral bolus management of saliva as evidenced by improvement in anterior loss of saliva from severe to minimal, implementing compensatory swallow strategies to an independent level within 8 weeks.   - Patient will improve accuracy of speech production implementing compensatory speech strategies on 8 out of 10 trials, 4/5 outpatient speech therapy sessions to improve intelligibility to 85% during production of novel phrase, sentence level productions.   -Patient will independently tolerate intake of puree and thin liquids with no overt s/sx of aspiration with all intake with use of strategies.  Potential barriers to Goal Achievement:  None  Therapy Recommendations  Recommendation:  Individual Speech Therapy and Dysphagia Treatment,  Planned Therapy Interventions:   Self-care home management, Swallow Dysfunction treatment, Speech/Language training, Dysphagia treatment, Home Program and Patient/Caregiver Education,   Plan Details:  Speech

## 2021-11-24 ENCOUNTER — SPEECH THERAPY (OUTPATIENT)
Dept: SPEECH THERAPY | Facility: REHABILITATION | Age: 51
End: 2021-11-24
Attending: NURSE PRACTITIONER
Payer: MEDICAID

## 2021-11-24 DIAGNOSIS — R47.1 DYSARTHRIA: ICD-10-CM

## 2021-11-24 DIAGNOSIS — S01.83XA: ICD-10-CM

## 2021-11-24 DIAGNOSIS — R13.12 OROPHARYNGEAL DYSPHAGIA: ICD-10-CM

## 2021-11-24 PROCEDURE — 92507 TX SP LANG VOICE COMM INDIV: CPT

## 2021-11-24 NOTE — OP THERAPY DAILY TREATMENT
Outpatient Speech Therapy  DAILY TREATMENT     [facility] Speech [address]  [address]  [place] [address]  Phone:  [phone]  Fax:  [phone]    Date: 11/24/2021    Patient: Greg Tovar  YOB: 1970  MRN: [id number]     Time Calculation    Start time: 1500  Stop time: 1550 Time Calculation (min): 50 minutes         Chief Complaint: Poor Speech    Visit #: 13    Subjective:   Reason for Therapy:     Reason For Evaluation:  Dysphagia  Social Support:     Patient Mental Status:  Alert and Responsive  Additional Subjective Comments:      Using mirror to assist with placement of spoon and consuming puree two times a day and taking in about 6 tablespoons at a time over the last 4 days.  Trying to place on left more when using mirror.  Placing water in staw and occluding nose with improved success with quicker AP transit times and clearance.  Speech continues to be impacting quality of life, but isn't able to get out and talk much.      Objective:   Treatments/Interventions Performed:  Home program, Speech/Language treatment, Compensatory strategy training and Patient/Caregiver education  Objective Details:  Words read out loud with 80-90% intelligibility 24/32 attempts. Functional phrases read with 90% intelligibility 20/21 attempts.  Discourse tasks completed with 70% intelligibility.  Long a vowel most impaired in speech.  Other vowels such as long I, short a, short I, long u, short u, aw, uh all are 90%+ intelligible.  Coarticulation significantly changes ability to comprehend sounds.  Liquids and glides most impacting intelligibility.         Speech Therapy Assessment:     Speech Mechanism Assessment:     Patient voice description: Clear    Patient's oral movements are voluntary and coordination: WFL    Patient speaks fluently: Moderate    Patient exhibits articulatory precision: Moderate (varies)    Patient exhibits single word intelligibility: Minimal (varies)     Patient exhibits sentence level intelligibility: Minimal    Patient exhibits conversational intelligibility: Moderate    Nasality is minimal    Hyponasality is mild.    Patient breath rate: Fast (cues to slow down)    Oral Phase Assessment:     Oral phase comments: Provided ideas for puree items to have and avoid for PO intake.       Speech Therapy Plan :   Prognosis & Recommendations  Impression Summary:  Improved understanding of how coarticulation impairs speech and how cognitive ability to fill in the blanks when sound are not produced accurately.  Also improved with understanding of minimal pairs and how important accuracy is in speech in order to have appropriate meaning with words.  Prognosis Details: Progressing towards goals, continues to require skilled need.  Compensatory swallow strategies:  Multiple swallows, Upright position 90 degrees during meal and 45 minutes after meal, Reduce bolus size, Alternate liquids/solids, Place Food in left side and No talking while eating  Diet Recommendation:  Pureed Foods  Liquid Recommendation:  Thin  Medication Administration:  Float/crush  Goals  Short Term Goals:  - Patient will improve oral bolus management of saliva as evidenced by improvement in anterior loss of saliva from severe to minimal, implementing compensatory swallow strategies to an independent level within 8 weeks.   - Patient will improve accuracy of speech production implementing compensatory speech strategies on 8 out of 10 trials, 4/5 outpatient speech therapy sessions to improve intelligibility to 85% during production of novel phrase, sentence level productions.   -Patient will independently tolerate intake of puree and thin liquids with no overt s/sx of aspiration with all intake with use of strategies.  Potential barriers to Goal Achievement:  None  Therapy Recommendations  Recommendation:  Individual Speech Therapy and Dysphagia Treatment,  Planned Therapy Interventions:  Self-care home  management, Swallow Dysfunction treatment, Speech/Language training, Dysphagia treatment, Home Program and Patient/Caregiver Education,   Plan Details:  Rhyming words

## 2021-12-01 ENCOUNTER — SPEECH THERAPY (OUTPATIENT)
Dept: SPEECH THERAPY | Facility: REHABILITATION | Age: 51
End: 2021-12-01
Attending: NURSE PRACTITIONER
Payer: MEDICAID

## 2021-12-01 DIAGNOSIS — R47.1 DYSARTHRIA: ICD-10-CM

## 2021-12-01 DIAGNOSIS — R13.12 OROPHARYNGEAL DYSPHAGIA: ICD-10-CM

## 2021-12-01 DIAGNOSIS — S01.83XA: ICD-10-CM

## 2021-12-01 PROCEDURE — 92507 TX SP LANG VOICE COMM INDIV: CPT

## 2021-12-01 NOTE — OP THERAPY DAILY TREATMENT
"  Outpatient Speech Therapy  DAILY TREATMENT     [facility] Speech [address]  [address]  [place] [address]  Phone:  [phone]  Fax:  [phone]    Date: 12/01/2021    Patient: Greg Tovar  YOB: 1970  MRN: [id number]     Time Calculation    Start time: 1200  Stop time: 1245 Time Calculation (min): 45 minutes         Chief Complaint: Speech Problem    Visit #: 14    Subjective:   Reason for Therapy:     Reason For Evaluation:  Speech/Language  Social Support:     Patient Mental Status:  Alert and Responsive  Progress Factors:     Progression:  Staying the same  Additional Subjective Comments:      Sees dentist today for denture fitting. Doing 6 spoons of puree in a row for PO intake x 2 a day.  Drinking through straw multiple times with no overt s/sx of aspiration.  Using mirror as often as possible to ensure no anterior spillage. Trying to \"not get excited\" so has more intelligible speech.  Spoke with mother on phone and she only asked to have him repeat 1 time in a 15 minute period and hadn't talked to her one phone since June.       Objective:   Treatments/Interventions Performed:  Home program, Patient/Caregiver education, Compensatory strategy training and Speech/Language treatment  Objective Details:  Rhyming words- 51/76 read out loud with 90%+ intelligibility with context provided.  Less context available he would be significantly less intelligible. Alveolar sounds are being produced as bilabials.  Stated that he cannot make the sounds accurately as his jaw and lips move despite knowing they shouldn't.  Words with /d,t, sh, dz,n, l/ all are impacted with misarticulated especially in word initial position.    Long a words completed 7/12 with 90%+ intelligibility, no assist.  Discourse tasks 70% intelligible.         Speech Therapy Assessment:     Speech Mechanism Assessment:   Speech mechanism comments: Alveolar sounds produced as bilabial sounds significantly " impact ability to comprehend when there is no context available.      Speech Therapy Plan :   Prognosis & Recommendations  Impression Summary:  Patient's speech impacted by inability to have lingual productions at alveolar ridge and over compensating with use of lips which creates more deficits with comprehension of communicating partner with no context.  Improved with use of slower rate and over articulation, but not always aware that speeding up.  Prognosis Details:  Progressing towards goals, continues to require skilled need.  Compensatory swallow strategies:  Multiple swallows, Upright position 90 degrees during meal and 45 minutes after meal, Reduce bolus size, Alternate liquids/solids, Place Food in left side and No talking while eating  Diet Recommendation:  Pureed Foods  Liquid Recommendation:  Thin  Medication Administration:  Float/crush  Goals  Short Term Goals:  - Patient will improve oral bolus management of saliva as evidenced by improvement in anterior loss of saliva from severe to minimal, implementing compensatory swallow strategies to an independent level within 8 weeks.   - Patient will improve accuracy of speech production implementing compensatory speech strategies on 8 out of 10 trials, 4/5 outpatient speech therapy sessions to improve intelligibility to 85% during production of novel phrase, sentence level productions.   -Patient will independently tolerate intake of puree and thin liquids with no overt s/sx of aspiration with all intake with use of strategies.  Potential barriers to Goal Achievement:  None  Therapy Recommendations  Recommendation:  Individual Speech Therapy and Dysphagia Treatment,  Planned Therapy Interventions:  Self-care home management, Swallow Dysfunction treatment, Speech/Language training, Dysphagia treatment, Home Program and Patient/Caregiver Education,   Plan Details:  Speech in sentence with rhyming words, swallow

## 2021-12-08 ENCOUNTER — SPEECH THERAPY (OUTPATIENT)
Dept: SPEECH THERAPY | Facility: REHABILITATION | Age: 51
End: 2021-12-08
Attending: NURSE PRACTITIONER
Payer: MEDICAID

## 2021-12-08 DIAGNOSIS — R13.12 OROPHARYNGEAL DYSPHAGIA: ICD-10-CM

## 2021-12-08 DIAGNOSIS — R47.1 DYSARTHRIA: ICD-10-CM

## 2021-12-08 DIAGNOSIS — S01.83XA: ICD-10-CM

## 2021-12-08 PROCEDURE — 92507 TX SP LANG VOICE COMM INDIV: CPT

## 2021-12-08 NOTE — OP THERAPY DAILY TREATMENT
Outpatient Speech Therapy  DAILY TREATMENT     [facility] Speech [address]  [address]  [place] [address]  Phone:  [phone]  Fax:  [phone]    Date: 12/08/2021    Patient: Greg Tovar  YOB: 1970  MRN: [id number]     Time Calculation    Start time: 1212  Stop time: 1251 Time Calculation (min): 39 minutes         Chief Complaint: Other (difficulty chewing) and Speech Problem    Visit #: 15    Subjective:   Reason for Therapy:     Reason For Evaluation:  Dysphagia and Speech/Language  Social Support:     Patient Mental Status:  Alert and Responsive  Progress Factors:     Progression:  Getting Better  Additional Subjective Comments:      Late to therapy.  Doing 6 feedings a day.  2 feeding of yogurt am and pm and afternoon with refried beans.  Hasn't spoken to RD yet as far as decreasing feedings.  Having more at a time and increased endurance, but not wanting to consume PO hourly as he wants to be able to keep weight up with only 3 feedings a day due to work requirements.  Drool continues to be less. Roommate understanding him better.  Spoke with best friend and was frustrated over phone as wasn't intelligible to him, hung up and texted  Dec 30th f/u with dentist.  About January to receive dentures.      Objective:   Treatments/Interventions Performed:  Dysphagia treatment, Speech/Language treatment, Patient/Caregiver education and Compensatory strategy training  Objective Details:  Speech in sentence 11/17 attempts that were 90%+ intelligibility with set up assist for slower rate and over articulation.  Alveolar and liquids sounds continued be problematic.  Discourse tasks completed with 70-80% intelligibility given min cues  Trials of thin by straw completed with adequate swallow and no overt s/sx of aspiration with all intake.  Nasal occlusion independently.             Speech Therapy Assessment:     Speech Mechanism Assessment:     Patient exhibits articulatory  precision: Moderate (varies)    Patient exhibits single word intelligibility: Minimal    Patient exhibits sentence level intelligibility: Moderate    Patient exhibits conversational intelligibility: Moderate      Speech Therapy Plan :   Prognosis & Recommendations  Impression Summary:  Continues to improve with retention of strategies and use in discourse tasks and the importance of their use in communication.  Prognosis Details:  Progressing towards goals, continues to require skilled need.  Compensatory swallow strategies:  Multiple swallows, Upright position 90 degrees during meal and 45 minutes after meal, Reduce bolus size, Alternate liquids/solids, Place Food in left side and No talking while eating  Diet Recommendation:  Pureed Foods  Liquid Recommendation:  Thin  Medication Administration:  Float/crush  Goals  Short Term Goals:  - Patient will improve oral bolus management of saliva as evidenced by improvement in anterior loss of saliva from severe to minimal, implementing compensatory swallow strategies to an independent level within 8 weeks.   - Patient will improve accuracy of speech production implementing compensatory speech strategies on 8 out of 10 trials, 4/5 outpatient speech therapy sessions to improve intelligibility to 85% during production of novel phrase, sentence level productions.   -Patient will independently tolerate intake of puree and thin liquids with no overt s/sx of aspiration with all intake with use of strategies.  Potential barriers to Goal Achievement:  None  Therapy Recommendations  Recommendation:  Individual Speech Therapy and Dysphagia Treatment,  Planned Therapy Interventions:  Self-care home management, Swallow Dysfunction treatment, Speech/Language training, Dysphagia treatment, Home Program and Patient/Caregiver Education,   Plan Details:  Functional phrase

## 2021-12-15 ENCOUNTER — SPEECH THERAPY (OUTPATIENT)
Dept: SPEECH THERAPY | Facility: REHABILITATION | Age: 51
End: 2021-12-15
Attending: NURSE PRACTITIONER
Payer: MEDICAID

## 2021-12-15 DIAGNOSIS — R13.12 OROPHARYNGEAL DYSPHAGIA: ICD-10-CM

## 2021-12-15 DIAGNOSIS — S02.92XB: ICD-10-CM

## 2021-12-15 DIAGNOSIS — R47.1 DYSARTHRIA: ICD-10-CM

## 2021-12-15 DIAGNOSIS — S01.83XA: ICD-10-CM

## 2021-12-15 PROCEDURE — 92507 TX SP LANG VOICE COMM INDIV: CPT

## 2021-12-15 NOTE — OP THERAPY DAILY TREATMENT
"  Outpatient Speech Therapy  DAILY TREATMENT     [facility] Speech [address].  [address]  [place] [address]  Phone:  [phone]  Fax:  [phone]    Date: 12/15/2021    Patient: Greg Tovar  YOB: 1970  MRN: [id number]     Time Calculation    Start time: 1203  Stop time: 1247 Time Calculation (min): 44 minutes         Chief Complaint: Speech Problem    Visit #: 16    Subjective:   Reason for Therapy:     Reason For Evaluation:  Speech/Language and Dysphagia  Social Support:     Patient Mental Status:  Alert and Responsive  Progress Factors:     Progression:  Getting Better  Additional Subjective Comments:      Attempted refried beans with more water and mashing the beans and able to tolerate.    Maintaining weight.  No significant changes with speech. Drooling \"a lot better\", but have days that are better than others.      Objective:   Treatments/Interventions Performed:  Home program, Patient/Caregiver education, Speech/Language treatment and Compensatory strategy training  Objective Details:  Functional phrases completed 98% intelligibility.  Structured speech min cues to slow rate and over articulation 90%+ intelligibility.  Unstructured conversational tasks completed with 80-90% intelligibility, min cues.         Speech Therapy Assessment:     Speech Mechanism Assessment:     Patient exhibits sentence level intelligibility: Supervision Required    Patient exhibits conversational intelligibility: Minimal (Varies)    ST Speech Mechanism/Voice Assessment L12: Varied.  Speech mechanism comments: /sh/ at word beginning and word final significant difficulty for intelligibility.Verbalized strategies independently.       Speech Therapy Plan :   Prognosis & Recommendations  Impression Summary:  Patient continues to improve with maintaining use of strategies in more complex situations.  Prognosis Details: Progressing towards goals, continues to require skilled " need.  Compensatory swallow strategies:  Multiple swallows, Upright position 90 degrees during meal and 45 minutes after meal, Reduce bolus size, Alternate liquids/solids, Place Food in left side and No talking while eating  Diet Recommendation:  Pureed Foods  Liquid Recommendation:  Thin  Medication Administration:  Float/crush  Goals  Short Term Goals:  - Patient will improve oral bolus management of saliva as evidenced by improvement in anterior loss of saliva from severe to minimal, implementing compensatory swallow strategies to an independent level within 8 weeks.   - Patient will improve accuracy of speech production implementing compensatory speech strategies on 8 out of 10 trials, 4/5 outpatient speech therapy sessions to improve intelligibility to 85% during production of novel phrase, sentence level productions.   -Patient will independently tolerate intake of puree and thin liquids with no overt s/sx of aspiration with all intake with use of strategies.  Potential barriers to Goal Achievement:  None  Therapy Recommendations  Recommendation:  Individual Speech Therapy and Dysphagia Treatment,  Planned Therapy Interventions:  Self-care home management, Swallow Dysfunction treatment, Speech/Language training, Dysphagia treatment, Home Program and Patient/Caregiver Education,   Plan Details:  Conversational tasks and discharge

## 2021-12-29 ENCOUNTER — SPEECH THERAPY (OUTPATIENT)
Dept: SPEECH THERAPY | Facility: REHABILITATION | Age: 51
End: 2021-12-29
Attending: NURSE PRACTITIONER
Payer: MEDICAID

## 2021-12-29 DIAGNOSIS — S01.83XA: ICD-10-CM

## 2021-12-29 DIAGNOSIS — R47.1 DYSARTHRIA: ICD-10-CM

## 2021-12-29 DIAGNOSIS — R13.12 OROPHARYNGEAL DYSPHAGIA: ICD-10-CM

## 2021-12-29 PROCEDURE — 92507 TX SP LANG VOICE COMM INDIV: CPT

## 2021-12-29 NOTE — OP THERAPY DAILY TREATMENT
"  Outpatient Speech Therapy  DAILY TREATMENT     [facility] Speech [address].  [address]  [place] [address]  Phone:  [phone]  Fax:  [phone]    Date: 12/29/2021    Patient: Greg Tovar  YOB: 1970  MRN: [id number]     Time Calculation    Start time: 1201  Stop time: 1242 Time Calculation (min): 41 minutes         Chief Complaint: Other (eye and saliva management)    Visit #: 17    Subjective:   Reason for Therapy:     Reason For Evaluation:  Dysphagia and Dysarthria  Social Support:     Patient Mental Status:  Alert and Responsive  Progress Factors:     Progression:  Staying the same  Additional Subjective Comments:      Increased awareness with refried beans and not having to mash up as much.  Sees dentist Jan 4th for dentures.  \"Won't look like a freak show.\" Excited to be able to chew.   Conversational partners are not having to ask to repeat as much.  Hoping that with dentures improved saliva management will help increase intelligibility as not needing to cover up mask and towel to manage anterior spillage. 1 protein 12 oz drink and 6 containers in PEG.  Consuming yogurt, water, refried beans, and energy drinks as able.       Objective:   Treatments/Interventions Performed:  Dysphagia treatment, Speech/Language treatment, Patient/Caregiver education and Compensatory strategy training  Objective Details:  Conversational tasks slight cues provided for use of strategies for 95%+ intelligibility.  Patient independently talked back appropriate increase in viscosities once receive dentures to ensure safe with higher densities.         Speech Therapy Assessment:     Speech Mechanism Assessment:     Patient voice description: Clear    Patient exhibits articulatory precision: Minimal    Patient exhibits single word intelligibility: Supervision Required    Patient exhibits sentence level intelligibility: Supervision Required    Patient uses adequate breath support: Yes    " Patient breath rate: Normal (varies)  Speech mechanism comments: Gestures impemented when needed to complensate for breakdowns.     Oral Motor Status:     Labial strength and control for patient: Fair    Lingual strength and control for patient: Fair    Patient saliva management: Fair (reduced from inital eval)Patient oral sensation and awareness: Fair (reduced, but improved from inital eval)    Patient awareness of swallow problem: Good    Oral Phase Assessment:     Types of food within functional limits: Thin Liquid    Types of food with anterior spillage: Thin Liquid (intermittent)    Patient tongue thrust (A-P movement): Puree    Patient stasis and residue: Puree (slight to minimal)    Laterality of patient's difficulty chewing: Bilateral    Liquid wash to clear residue: Puree    Pharyngeal Phase Assessment:     Delayed Swallow    Food types within functional limits: Puree and Thin Liquid      Speech Therapy Plan :   Prognosis & Recommendations  Impression Summary:  Continued to demonstrate improved carryover of speech and dysphagia strategies for improved function.  Compensatory swallow strategies:  Multiple swallows, Upright position 90 degrees during meal and 45 minutes after meal, Reduce bolus size, Alternate liquids/solids, Place Food in left side and No talking while eating  Diet Recommendation:  Pureed Foods  Liquid Recommendation:  Thin  Medication Administration:  Float/crush  Goals  Short Term Goals:  - Patient will improve oral bolus management of saliva as evidenced by improvement in anterior loss of saliva from severe to minimal, implementing compensatory swallow strategies to an independent level within 8 weeks. MET  - Patient will improve accuracy of speech production implementing compensatory speech strategies on 8 out of 10 trials, 4/5 outpatient speech therapy sessions to improve intelligibility to 85% during production of novel phrase, sentence level productions. MET  -Patient will  independently tolerate intake of puree and thin liquids with no overt s/sx of aspiration with all intake with use of strategies. MET  Potential barriers to Goal Achievement:  None  Therapy Recommendations  Recommendation:  No further speech therapy indicated at this time,  Plan Details:  Patient to follow up after receive dentures if concerns with speech or swallowing at that time.
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
## 2022-02-18 ENCOUNTER — APPOINTMENT (OUTPATIENT)
Dept: RADIOLOGY | Facility: MEDICAL CENTER | Age: 52
End: 2022-02-18
Attending: EMERGENCY MEDICINE
Payer: MEDICAID

## 2022-02-18 ENCOUNTER — HOSPITAL ENCOUNTER (EMERGENCY)
Facility: MEDICAL CENTER | Age: 52
End: 2022-02-18
Attending: EMERGENCY MEDICINE
Payer: MEDICAID

## 2022-02-18 VITALS
DIASTOLIC BLOOD PRESSURE: 78 MMHG | TEMPERATURE: 96.9 F | SYSTOLIC BLOOD PRESSURE: 130 MMHG | WEIGHT: 176.37 LBS | BODY MASS INDEX: 23.89 KG/M2 | OXYGEN SATURATION: 94 % | HEIGHT: 72 IN | HEART RATE: 77 BPM | RESPIRATION RATE: 18 BRPM

## 2022-02-18 DIAGNOSIS — K94.23 GASTROSTOMY TUBE DYSFUNCTION (HCC): ICD-10-CM

## 2022-02-18 PROCEDURE — 74018 RADEX ABDOMEN 1 VIEW: CPT

## 2022-02-18 PROCEDURE — 43762 RPLC GTUBE NO REVJ TRC: CPT

## 2022-02-18 PROCEDURE — 99284 EMERGENCY DEPT VISIT MOD MDM: CPT | Mod: 25

## 2022-02-18 ASSESSMENT — LIFESTYLE VARIABLES
HAVE PEOPLE ANNOYED YOU BY CRITICIZING YOUR DRINKING: NO
EVER FELT BAD OR GUILTY ABOUT YOUR DRINKING: NO
TOTAL SCORE: 0
HAVE YOU EVER FELT YOU SHOULD CUT DOWN ON YOUR DRINKING: NO
TOTAL SCORE: 0
DOES PATIENT WANT TO STOP DRINKING: NO
TOTAL SCORE: 0
CONSUMPTION TOTAL: INCOMPLETE
EVER HAD A DRINK FIRST THING IN THE MORNING TO STEADY YOUR NERVES TO GET RID OF A HANGOVER: NO
DO YOU DRINK ALCOHOL: NO

## 2022-02-18 ASSESSMENT — FIBROSIS 4 INDEX: FIB4 SCORE: 1.74

## 2022-02-18 NOTE — ED NOTES
This nurse contacted central supply supervisor re: update on g-tube supply. Supervisor to follow-up with coworker to have supplies sent.

## 2022-02-18 NOTE — ED TRIAGE NOTES
Chief Complaint   Patient presents with   • Post-Op Complications     Pt reports to have pulled on G-tube and now feels like it is pulled out.      Explained to pt triage process, made pt aware to tell this RN/staff of any changes/concerns, pt verbalized understanding of process and instructions given. Pt to KAI self.

## 2022-02-18 NOTE — ED PROVIDER NOTES
CHIEF COMPLAINT  Chief Complaint   Patient presents with   • Post-Op Complications     Pt reports to have pulled on G-tube and now feels like it is pulled out.        HPI  Akil Tovar is a 52 y.o. male who presents after he accidentally pulled his G-tube what he described as partially out.  However it appears he pulled the entire thing off.  He has no abdominal pain.  No vomiting or diarrhea.  No fevers.  This G-tube apparently was placed in May of last year by Dr. Cabrera.  No significant bleeding    REVIEW OF SYSTEMS  No fevers or vomiting. All other systems negative.       PAST MEDICAL HISTORY  Past Medical History:   Diagnosis Date   • Bronchitis        FAMILY HISTORY  Family History   Problem Relation Age of Onset   • Hypertension Father    • Cancer Father        SOCIAL HISTORY  Social History     Socioeconomic History   • Marital status: Single   Tobacco Use   • Smoking status: Former Smoker     Packs/day: 2.00     Years: 35.00     Pack years: 70.00     Types: Cigarettes     Start date: 1986     Quit date: 2021     Years since quittin.7   • Smokeless tobacco: Former User     Types: Snuff     Quit date: 1924   Vaping Use   • Vaping Use: Never used   Substance and Sexual Activity   • Alcohol use: Not Currently   • Drug use: Never       SURGICAL HISTORY  Past Surgical History:   Procedure Laterality Date   • ORBITAL FRACTURE ORIF Left 2021    Procedure: ORIF, FRACTURE, ORBIT - ENOPHTHALMOS.;  Surgeon: Moises Okeefe Jr., M.D.;  Location: SURGERY SAME DAY [facility];  Service: Plastics   • TX LAP,GASTROSTOMY,W/O TUBE CONSTR N/A 2021    Procedure: CREATION, GASTROSTOMY, LAPAROSCOPIC;  Surgeon: Annia Cabrera M.D.;  Location: SURGERY [facility];  Service: General   • LACERATION REPAIR  2021    Procedure: REPAIR, LACERATION - FOR FACE EXPLORATION;  Surgeon: Moises Okeefe Jr., M.D.;  Location: SURGERY [facility];  Service: Plastics       CURRENT MEDICATIONS  Home Medications    **Home  medications have not yet been reviewed for this encounter**         ALLERGIES  No Known Allergies    PHYSICAL EXAM  VITAL SIGNS: /91   Pulse 61   Temp 35.8 °C (96.5 °F) (Temporal)   Resp 16   Ht 1.829 m (6')   Wt 80 kg (176 lb 5.9 oz)   SpO2 96%   BMI 23.92 kg/m²      Constitutional: Well developed, Well nourished, No acute distress, Non-toxic appearance.   HENT: Normocephalic, Atraumatic  Cardiovascular: Regular pulse  Lungs: No respiratory distress  Skin: Warm, Dry, no rash  Abdomen: There is no erythema no blood noted on the abdomen where the ostomy site is-the G-tube was completely pulled out as I pulled back the dressing-it was not partially in.  It appears to be an 18 [place] G-tube.  Extremities: No edema  Neurologic: Alert, appropriate, follows commands  Psychiatric: Affect normal    RADIOLOGY/PROCEDURES  [id number] VIEW   Final Result      1.  G-tube injection showing intragastric position with no extravasation.          Gastrostomy Tube Replacement Procedure Note    Indication: Spontaneous dislodgement    Procedure: G-tube was inserted after Betadine prep a 16 Tun[place]ian G-tube was inserted after Betadine prep.  The lumen was inflated with saline to 5 cc.  No complications.        COURSE & MEDICAL DECISION MAKING  Pertinent Labs & Imaging studies reviewed. (See chart for details)    This is a 52-year-old who presents with a dislodged G-tube.  It was replaced here and it appears to be in the right place based on contrast injection and KUB.  Patient will be discharged.    11:58 AM Patient was seen and examined at bedside. I informed patient of plan to perform tube placement.     1:09 PM Performed G tube placement procedure as noted above. I discussed plan for discharge and follow up as outlined below. The patient verbalizes they feel comfortable going home. The patient is stable for discharge at this time and will return for any new or worsening symptoms. Patient verbalizes understanding and  support with my plan for discharge.       FINAL IMPRESSION     1. Gastrostomy tube dysfunction (HCC)      Gastrostomy tube placement procedure performed by ERP      Electronically signed by: Trip Castillo M.D., 2/18/2022 11:56 AM   Vickie SOTO (Scribe), am scribing for, and in the presence of, Trip Castillo M.D..    Electronically signed by: Vickie Price (Luis Alfredoibbetty), 2/18/2022    Trip SOTO M.D. personally performed the services described in this documentation, as scribed by Vickie Price in my presence, and it is both accurate and complete. C

## 2022-02-18 NOTE — ED NOTES
New g-tube placed by ERP. Site dressed with non-adherent gauze and silk tape by this nurse, for protection. Pt educated on dressing, given supplies. Pt demonstrated understanding, verbally. Pt has no requests at this time. Diagnostic tests to be performed to confirm placement.

## 2022-08-22 ENCOUNTER — PRE-ADMISSION TESTING (OUTPATIENT)
Dept: ADMISSIONS | Facility: MEDICAL CENTER | Age: 52
End: 2022-08-22
Attending: DENTIST
Payer: MEDICAID

## 2022-08-22 ASSESSMENT — FIBROSIS 4 INDEX: FIB4 SCORE: 1.74

## 2022-08-24 ENCOUNTER — ANESTHESIA (OUTPATIENT)
Dept: SURGERY | Facility: MEDICAL CENTER | Age: 52
End: 2022-08-24
Payer: MEDICAID

## 2022-08-24 ENCOUNTER — ANESTHESIA EVENT (OUTPATIENT)
Dept: SURGERY | Facility: MEDICAL CENTER | Age: 52
End: 2022-08-24
Payer: MEDICAID

## 2022-08-24 ENCOUNTER — HOSPITAL ENCOUNTER (OUTPATIENT)
Facility: MEDICAL CENTER | Age: 52
End: 2022-08-24
Attending: DENTIST | Admitting: DENTIST
Payer: MEDICAID

## 2022-08-24 VITALS
SYSTOLIC BLOOD PRESSURE: 141 MMHG | DIASTOLIC BLOOD PRESSURE: 78 MMHG | HEIGHT: 71 IN | HEART RATE: 75 BPM | RESPIRATION RATE: 20 BRPM | BODY MASS INDEX: 24.88 KG/M2 | WEIGHT: 177.69 LBS | OXYGEN SATURATION: 93 % | TEMPERATURE: 97.7 F

## 2022-08-24 DIAGNOSIS — Z94.5 S/P SPLIT THICKNESS SKIN GRAFT: ICD-10-CM

## 2022-08-24 PROCEDURE — C1713 ANCHOR/SCREW BN/BN,TIS/BN: HCPCS | Performed by: DENTIST

## 2022-08-24 PROCEDURE — 160025 RECOVERY II MINUTES (STATS): Performed by: DENTIST

## 2022-08-24 PROCEDURE — 700101 HCHG RX REV CODE 250: Performed by: ANESTHESIOLOGY

## 2022-08-24 PROCEDURE — 700105 HCHG RX REV CODE 258: Performed by: ANESTHESIOLOGY

## 2022-08-24 PROCEDURE — 160046 HCHG PACU - 1ST 60 MINS PHASE II: Performed by: DENTIST

## 2022-08-24 PROCEDURE — 700111 HCHG RX REV CODE 636 W/ 250 OVERRIDE (IP): Performed by: ANESTHESIOLOGY

## 2022-08-24 PROCEDURE — 700101 HCHG RX REV CODE 250: Performed by: DENTIST

## 2022-08-24 PROCEDURE — A9270 NON-COVERED ITEM OR SERVICE: HCPCS | Performed by: ANESTHESIOLOGY

## 2022-08-24 PROCEDURE — 700102 HCHG RX REV CODE 250 W/ 637 OVERRIDE(OP): Performed by: DENTIST

## 2022-08-24 PROCEDURE — A9270 NON-COVERED ITEM OR SERVICE: HCPCS | Performed by: DENTIST

## 2022-08-24 PROCEDURE — 160028 HCHG SURGERY MINUTES - 1ST 30 MINS LEVEL 3: Performed by: DENTIST

## 2022-08-24 PROCEDURE — 00170 ANES INTRAORAL PX NOS: CPT | Performed by: ANESTHESIOLOGY

## 2022-08-24 PROCEDURE — 160039 HCHG SURGERY MINUTES - EA ADDL 1 MIN LEVEL 3: Performed by: DENTIST

## 2022-08-24 PROCEDURE — 160009 HCHG ANES TIME/MIN: Performed by: DENTIST

## 2022-08-24 PROCEDURE — 160048 HCHG OR STATISTICAL LEVEL 1-5: Performed by: DENTIST

## 2022-08-24 PROCEDURE — 700111 HCHG RX REV CODE 636 W/ 250 OVERRIDE (IP): Performed by: DENTIST

## 2022-08-24 PROCEDURE — 700102 HCHG RX REV CODE 250 W/ 637 OVERRIDE(OP): Performed by: ANESTHESIOLOGY

## 2022-08-24 PROCEDURE — 160035 HCHG PACU - 1ST 60 MINS PHASE I: Performed by: DENTIST

## 2022-08-24 PROCEDURE — 160047 HCHG PACU  - EA ADDL 30 MINS PHASE II: Performed by: DENTIST

## 2022-08-24 PROCEDURE — 160002 HCHG RECOVERY MINUTES (STAT): Performed by: DENTIST

## 2022-08-24 PROCEDURE — 700105 HCHG RX REV CODE 258: Performed by: DENTIST

## 2022-08-24 DEVICE — BONE CEMENT SIMPLEX FULL DOSE - (10EA/PK): Type: IMPLANTABLE DEVICE | Status: FUNCTIONAL

## 2022-08-24 RX ORDER — CHLORHEXIDINE GLUCONATE ORAL RINSE 1.2 MG/ML
15 SOLUTION DENTAL 2 TIMES DAILY
Qty: 473 ML | Refills: 0 | Status: SHIPPED | OUTPATIENT
Start: 2022-08-24 | End: 2022-09-09

## 2022-08-24 RX ORDER — OXYCODONE HCL 5 MG/5 ML
10 SOLUTION, ORAL ORAL
Status: COMPLETED | OUTPATIENT
Start: 2022-08-24 | End: 2022-08-24

## 2022-08-24 RX ORDER — MAGNESIUM CARB/ALUMINUM HYDROX 105-160MG
TABLET,CHEWABLE ORAL
Status: DISCONTINUED | OUTPATIENT
Start: 2022-08-24 | End: 2022-08-24 | Stop reason: HOSPADM

## 2022-08-24 RX ORDER — MIDAZOLAM HYDROCHLORIDE 1 MG/ML
INJECTION INTRAMUSCULAR; INTRAVENOUS PRN
Status: DISCONTINUED | OUTPATIENT
Start: 2022-08-24 | End: 2022-08-24 | Stop reason: SURG

## 2022-08-24 RX ORDER — DEXAMETHASONE SODIUM PHOSPHATE 4 MG/ML
INJECTION, SOLUTION INTRA-ARTICULAR; INTRALESIONAL; INTRAMUSCULAR; INTRAVENOUS; SOFT TISSUE PRN
Status: DISCONTINUED | OUTPATIENT
Start: 2022-08-24 | End: 2022-08-24 | Stop reason: SURG

## 2022-08-24 RX ORDER — EPINEPHRINE 1 MG/ML(1)
AMPUL (ML) INJECTION
Status: DISCONTINUED | OUTPATIENT
Start: 2022-08-24 | End: 2022-08-24 | Stop reason: HOSPADM

## 2022-08-24 RX ORDER — ROCURONIUM BROMIDE 10 MG/ML
INJECTION, SOLUTION INTRAVENOUS PRN
Status: DISCONTINUED | OUTPATIENT
Start: 2022-08-24 | End: 2022-08-24 | Stop reason: SURG

## 2022-08-24 RX ORDER — HALOPERIDOL 5 MG/ML
1 INJECTION INTRAMUSCULAR
Status: DISCONTINUED | OUTPATIENT
Start: 2022-08-24 | End: 2022-08-24 | Stop reason: HOSPADM

## 2022-08-24 RX ORDER — AMOXICILLIN 400 MG/5ML
400 POWDER, FOR SUSPENSION ORAL 3 TIMES DAILY
Qty: 75 ML | Refills: 0 | Status: SHIPPED | OUTPATIENT
Start: 2022-08-24 | End: 2022-08-29

## 2022-08-24 RX ORDER — ONDANSETRON 2 MG/ML
4 INJECTION INTRAMUSCULAR; INTRAVENOUS
Status: DISCONTINUED | OUTPATIENT
Start: 2022-08-24 | End: 2022-08-24 | Stop reason: HOSPADM

## 2022-08-24 RX ORDER — DIPHENHYDRAMINE HYDROCHLORIDE 50 MG/ML
12.5 INJECTION INTRAMUSCULAR; INTRAVENOUS
Status: DISCONTINUED | OUTPATIENT
Start: 2022-08-24 | End: 2022-08-24 | Stop reason: HOSPADM

## 2022-08-24 RX ORDER — SENNOSIDES 8.6 MG
325 CAPSULE ORAL
COMMUNITY

## 2022-08-24 RX ORDER — SODIUM CHLORIDE, SODIUM LACTATE, POTASSIUM CHLORIDE, CALCIUM CHLORIDE 600; 310; 30; 20 MG/100ML; MG/100ML; MG/100ML; MG/100ML
INJECTION, SOLUTION INTRAVENOUS CONTINUOUS
Status: DISCONTINUED | OUTPATIENT
Start: 2022-08-24 | End: 2022-08-24 | Stop reason: HOSPADM

## 2022-08-24 RX ORDER — LIDOCAINE HYDROCHLORIDE 20 MG/ML
INJECTION, SOLUTION EPIDURAL; INFILTRATION; INTRACAUDAL; PERINEURAL PRN
Status: DISCONTINUED | OUTPATIENT
Start: 2022-08-24 | End: 2022-08-24 | Stop reason: SURG

## 2022-08-24 RX ORDER — CEFAZOLIN SODIUM 1 G/3ML
INJECTION, POWDER, FOR SOLUTION INTRAMUSCULAR; INTRAVENOUS PRN
Status: DISCONTINUED | OUTPATIENT
Start: 2022-08-24 | End: 2022-08-24 | Stop reason: SURG

## 2022-08-24 RX ORDER — LIDOCAINE HYDROCHLORIDE AND EPINEPHRINE 10; 10 MG/ML; UG/ML
INJECTION, SOLUTION INFILTRATION; PERINEURAL
Status: DISCONTINUED | OUTPATIENT
Start: 2022-08-24 | End: 2022-08-24 | Stop reason: HOSPADM

## 2022-08-24 RX ORDER — OXYCODONE HCL 5 MG/5 ML
5 SOLUTION, ORAL ORAL
Status: COMPLETED | OUTPATIENT
Start: 2022-08-24 | End: 2022-08-24

## 2022-08-24 RX ORDER — SODIUM CHLORIDE, SODIUM LACTATE, POTASSIUM CHLORIDE, CALCIUM CHLORIDE 600; 310; 30; 20 MG/100ML; MG/100ML; MG/100ML; MG/100ML
INJECTION, SOLUTION INTRAVENOUS CONTINUOUS
Status: ACTIVE | OUTPATIENT
Start: 2022-08-24 | End: 2022-08-24

## 2022-08-24 RX ADMIN — MIDAZOLAM HYDROCHLORIDE 2 MG: 1 INJECTION, SOLUTION INTRAMUSCULAR; INTRAVENOUS at 09:38

## 2022-08-24 RX ADMIN — OXYCODONE HYDROCHLORIDE 10 MG: 5 SOLUTION ORAL at 11:58

## 2022-08-24 RX ADMIN — LIDOCAINE HYDROCHLORIDE 0.5 ML: 10 INJECTION, SOLUTION EPIDURAL; INFILTRATION; INTRACAUDAL; PERINEURAL at 07:55

## 2022-08-24 RX ADMIN — PROPOFOL 150 MG: 10 INJECTION, EMULSION INTRAVENOUS at 09:38

## 2022-08-24 RX ADMIN — CEFAZOLIN 2 G: 330 INJECTION, POWDER, FOR SOLUTION INTRAMUSCULAR; INTRAVENOUS at 09:38

## 2022-08-24 RX ADMIN — SODIUM CHLORIDE, POTASSIUM CHLORIDE, SODIUM LACTATE AND CALCIUM CHLORIDE: 600; 310; 30; 20 INJECTION, SOLUTION INTRAVENOUS at 07:56

## 2022-08-24 RX ADMIN — FENTANYL CITRATE 50 MCG: 50 INJECTION, SOLUTION INTRAMUSCULAR; INTRAVENOUS at 11:58

## 2022-08-24 RX ADMIN — SODIUM CHLORIDE, POTASSIUM CHLORIDE, SODIUM LACTATE AND CALCIUM CHLORIDE: 600; 310; 30; 20 INJECTION, SOLUTION INTRAVENOUS at 11:58

## 2022-08-24 RX ADMIN — DEXAMETHASONE SODIUM PHOSPHATE 12 MG: 4 INJECTION, SOLUTION INTRA-ARTICULAR; INTRALESIONAL; INTRAMUSCULAR; INTRAVENOUS; SOFT TISSUE at 09:38

## 2022-08-24 RX ADMIN — SUGAMMADEX 200 MG: 100 INJECTION, SOLUTION INTRAVENOUS at 11:27

## 2022-08-24 RX ADMIN — ROCURONIUM BROMIDE 2.5 MG: 10 INJECTION, SOLUTION INTRAVENOUS at 09:38

## 2022-08-24 RX ADMIN — FENTANYL CITRATE 50 MCG: 50 INJECTION, SOLUTION INTRAMUSCULAR; INTRAVENOUS at 12:09

## 2022-08-24 RX ADMIN — LIDOCAINE HYDROCHLORIDE 100 MG: 20 INJECTION, SOLUTION EPIDURAL; INFILTRATION; INTRACAUDAL at 09:38

## 2022-08-24 RX ADMIN — Medication 100 MG: at 09:38

## 2022-08-24 ASSESSMENT — PAIN DESCRIPTION - PAIN TYPE
TYPE: SURGICAL PAIN

## 2022-08-24 ASSESSMENT — PAIN SCALES - GENERAL: PAIN_LEVEL: 4

## 2022-08-24 ASSESSMENT — FIBROSIS 4 INDEX: FIB4 SCORE: 1.74

## 2022-08-24 NOTE — ANESTHESIA PROCEDURE NOTES
Airway    Date/Time: 8/24/2022 9:41 AM  Performed by: Neil Gamino M.D.  Authorized by: Neil Gamino M.D.     Location:  OR  Urgency:  Elective  Indications for Airway Management:  Anesthesia      Spontaneous Ventilation: absent    Sedation Level:  Deep  Preoxygenated: Yes    Patient Position:  Sniffing  Final Airway Type:  Endotracheal airway  Final Endotracheal Airway:  ETT  Cuffed: Yes    Technique Used for Successful ETT Placement:  Video laryngoscopy    Insertion Site:  Oral  Blade Type:  Eduardo  Laryngoscope Blade/Videolaryngoscope Blade Size:  3  ETT Size (mm):  7.5  Measured from:  Teeth  ETT to Teeth (cm):  21  Placement Verified by: auscultation and capnometry    Cormack-Lehane Classification:  Grade I - full view of glottis  Number of Attempts at Approach:  1

## 2022-08-24 NOTE — OR NURSING
Spoke to Dr. Juarez regarding pharmacy not having scripts. Also notified him of pt.'s drainage. Dr. Juarez stated he will resend scripts, and that he expects the pt to continue to have some drainage.
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
## 2022-08-24 NOTE — DISCHARGE INSTRUCTIONS
If any questions arise, call your provider.  If your provider is not available, please feel free to call the Surgical Center at [phone].    MEDICATIONS: Resume taking daily medication.  Take prescribed pain medication with food.  If no medication is prescribed, you may take non-aspirin pain medication if needed.  PAIN MEDICATION CAN BE VERY CONSTIPATING.  Take a stool softener or laxative such as senokot, pericolace, or milk of magnesia if needed.    Last pain medication given at 11:58am.    What to Expect Post Anesthesia    Rest and take it easy for the first 24 hours.  A responsible adult is recommended to remain with you during that time.  It is normal to feel sleepy.  We encourage you to not do anything that requires balance, judgment or coordination.    FOR 24 HOURS DO NOT:  Drive, operate machinery or run household appliances.  Drink beer or alcoholic beverages.  Make important decisions or sign legal documents.    To avoid nausea, slowly advance diet as tolerated, avoiding spicy or greasy foods for the first day.  Add more substantial food to your diet according to your provider's instructions.  Babies can be fed formula or breast milk as soon as they are hungry.  INCREASE FLUIDS AND FIBER TO AVOID CONSTIPATION.    MILD FLU-LIKE SYMPTOMS ARE NORMAL.  YOU MAY EXPERIENCE GENERALIZED MUSCLE ACHES, THROAT IRRITATION, HEADACHE AND/OR SOME NAUSEA.

## 2022-08-24 NOTE — ANESTHESIA TIME REPORT
Anesthesia Start and Stop Event Times     Date Time Event    8/24/2022 0928 Anesthesia Start     0930 Ready for Procedure     1152 Anesthesia Stop        Responsible Staff  08/24/22    Name Role Begin End    Neil Gamino M.D. Anesth 0928 1152        Overtime Reason:  no overtime (within assigned shift)    Comments:
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
## 2022-12-27 ENCOUNTER — OFFICE VISIT (OUTPATIENT)
Dept: MEDICAL GROUP | Facility: MEDICAL CENTER | Age: 52
End: 2022-12-27
Attending: INTERNAL MEDICINE
Payer: MEDICAID

## 2022-12-27 VITALS
TEMPERATURE: 97.9 F | BODY MASS INDEX: 22.62 KG/M2 | WEIGHT: 167 LBS | HEART RATE: 88 BPM | OXYGEN SATURATION: 95 % | DIASTOLIC BLOOD PRESSURE: 68 MMHG | HEIGHT: 72 IN | RESPIRATION RATE: 16 BRPM | SYSTOLIC BLOOD PRESSURE: 100 MMHG

## 2022-12-27 DIAGNOSIS — Z13.220 SCREENING, LIPID: ICD-10-CM

## 2022-12-27 DIAGNOSIS — H54.40 BLINDNESS OF LEFT EYE WITH NORMAL VISION IN CONTRALATERAL EYE: ICD-10-CM

## 2022-12-27 DIAGNOSIS — K08.109 EDENTULOUS: ICD-10-CM

## 2022-12-27 DIAGNOSIS — R68.89: ICD-10-CM

## 2022-12-27 DIAGNOSIS — Z86.2 HISTORY OF ANEMIA: ICD-10-CM

## 2022-12-27 DIAGNOSIS — Z23 NEED FOR VACCINATION: ICD-10-CM

## 2022-12-27 DIAGNOSIS — Z13.1 SCREENING FOR DIABETES MELLITUS: ICD-10-CM

## 2022-12-27 DIAGNOSIS — K11.7 EXCESSIVE SALIVATION: ICD-10-CM

## 2022-12-27 DIAGNOSIS — R47.1 DYSARTHRIA: ICD-10-CM
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
## 2022-12-27 PROBLEM — T14.90XA TRAUMA: Status: RESOLVED | Noted: 2021-05-24 | Resolved: 2022-12-27

## 2022-12-27 PROBLEM — Z75.8 DISCHARGE PLANNING ISSUES: Status: RESOLVED | Noted: 2021-06-27 | Resolved: 2022-12-27

## 2022-12-27 PROBLEM — R13.10 DYSPHAGIA: Status: RESOLVED | Noted: 2021-06-09 | Resolved: 2022-12-27

## 2022-12-27 PROBLEM — Z87.81 HISTORY OF FACIAL FRACTURE: Status: ACTIVE | Noted: 2021-05-24

## 2022-12-27 PROBLEM — Z78.9 NO CONTRAINDICATION TO DEEP VEIN THROMBOSIS (DVT) PROPHYLAXIS: Status: RESOLVED | Noted: 2021-05-24 | Resolved: 2022-12-27

## 2022-12-27 PROBLEM — Z02.9 DISCHARGE PLANNING ISSUES: Status: RESOLVED | Noted: 2021-06-27 | Resolved: 2022-12-27

## 2022-12-27 PROCEDURE — 99213 OFFICE O/P EST LOW 20 MIN: CPT | Performed by: INTERNAL MEDICINE

## 2022-12-27 PROCEDURE — 99214 OFFICE O/P EST MOD 30 MIN: CPT | Mod: 25 | Performed by: INTERNAL MEDICINE

## 2022-12-27 PROCEDURE — 90471 IMMUNIZATION ADMIN: CPT

## 2022-12-27 RX ORDER — SCOLOPAMINE TRANSDERMAL SYSTEM 1 MG/1
1 PATCH, EXTENDED RELEASE TRANSDERMAL
Qty: 10 PATCH | Refills: 3 | Status: SHIPPED | OUTPATIENT
Start: 2022-12-27

## 2022-12-27 ASSESSMENT — FIBROSIS 4 INDEX: FIB4 SCORE: 1.74
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
## 2023-01-09 ENCOUNTER — TELEPHONE (OUTPATIENT)
Dept: MEDICAL GROUP | Facility: MEDICAL CENTER | Age: 53
End: 2023-01-09
Payer: MEDICAID

## 2023-01-09 NOTE — TELEPHONE ENCOUNTER
DOCUMENTATION OF PAR STATUS:    1. Name of Medication & Dose: SCOPOLAMINE 1MG/3DAY      2. Name of Prescription Coverage Company & phone #: [facility]  #[phone]    3. Date Prior Auth Submitted: 1/9/2023    4. What information was given to obtain insurance decision? ICD 10 CODE    5. Prior Auth Status? Pending    6. Patient Notified: N\A

## 2024-05-30 ENCOUNTER — OFFICE VISIT (OUTPATIENT)
Dept: MEDICAL GROUP | Facility: MEDICAL CENTER | Age: 54
End: 2024-05-30
Attending: INTERNAL MEDICINE
Payer: MEDICAID

## 2024-05-30 VITALS
DIASTOLIC BLOOD PRESSURE: 66 MMHG | SYSTOLIC BLOOD PRESSURE: 118 MMHG | HEART RATE: 80 BPM | RESPIRATION RATE: 16 BRPM | BODY MASS INDEX: 21.98 KG/M2 | OXYGEN SATURATION: 96 % | TEMPERATURE: 97.6 F | WEIGHT: 162.3 LBS | HEIGHT: 72 IN

## 2024-05-30 DIAGNOSIS — H54.7 WORSENING VISION: ICD-10-CM

## 2024-05-30 DIAGNOSIS — Z86.2 HISTORY OF ANEMIA: ICD-10-CM

## 2024-05-30 DIAGNOSIS — K11.7 EXCESSIVE SALIVATION: ICD-10-CM

## 2024-05-30 DIAGNOSIS — Z87.81 HISTORY OF FACIAL FRACTURE: ICD-10-CM

## 2024-05-30 DIAGNOSIS — Z12.11 SCREEN FOR COLON CANCER: ICD-10-CM

## 2024-05-30 DIAGNOSIS — Z13.220 SCREENING, LIPID: ICD-10-CM

## 2024-05-30 DIAGNOSIS — H54.40 BLINDNESS OF LEFT EYE WITH NORMAL VISION IN CONTRALATERAL EYE: ICD-10-CM

## 2024-05-30 DIAGNOSIS — Z13.1 SCREENING FOR DIABETES MELLITUS: ICD-10-CM

## 2024-05-30 RX ORDER — SCOLOPAMINE TRANSDERMAL SYSTEM 1 MG/1
1 PATCH, EXTENDED RELEASE TRANSDERMAL
Qty: 10 PATCH | Refills: 3 | Status: SHIPPED | OUTPATIENT
Start: 2024-05-30

## 2024-05-30 ASSESSMENT — PATIENT HEALTH QUESTIONNAIRE - PHQ9
SUM OF ALL RESPONSES TO PHQ QUESTIONS 1-9: 1
CLINICAL INTERPRETATION OF PHQ2 SCORE: 1
5. POOR APPETITE OR OVEREATING: 0 - NOT AT ALL
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
## 2024-06-05 ENCOUNTER — TELEPHONE (OUTPATIENT)
Dept: MEDICAL GROUP | Facility: MEDICAL CENTER | Age: 54
End: 2024-06-05
Payer: MEDICAID

## 2024-06-05 NOTE — TELEPHONE ENCOUNTER
FINAL PRIOR AUTHORIZATION STATUS:    1.  Name of Medication & Dose:  scopolamine (TRANSDERM SCOP, 1.5 MG,) 1 mg/72hr PATCH 72 HR     2. Prior Auth Status: Approved through       3. Action Taken: Pharmacy Notified: N\A Patient Notified: yes

## 2024-06-11 PROCEDURE — RXMED WILLOW AMBULATORY MEDICATION CHARGE: Performed by: INTERNAL MEDICINE

## 2024-06-12 ENCOUNTER — PHARMACY VISIT (OUTPATIENT)
Dept: PHARMACY | Facility: MEDICAL CENTER | Age: 54
End: 2024-06-12
Payer: COMMERCIAL

## 2025-02-14 ENCOUNTER — OFFICE VISIT (OUTPATIENT)
Dept: MEDICAL GROUP | Facility: MEDICAL CENTER | Age: 55
End: 2025-02-14
Attending: INTERNAL MEDICINE
Payer: MEDICAID

## 2025-02-14 VITALS
BODY MASS INDEX: 23.85 KG/M2 | DIASTOLIC BLOOD PRESSURE: 68 MMHG | HEART RATE: 113 BPM | HEIGHT: 71 IN | SYSTOLIC BLOOD PRESSURE: 122 MMHG | OXYGEN SATURATION: 95 % | RESPIRATION RATE: 16 BRPM | WEIGHT: 170.4 LBS | TEMPERATURE: 97.9 F

## 2025-02-14 DIAGNOSIS — R47.1 DYSARTHRIA: ICD-10-CM

## 2025-02-14 DIAGNOSIS — Z13.220 SCREENING, LIPID: ICD-10-CM

## 2025-02-14 DIAGNOSIS — Z13.228 SCREENING FOR METABOLIC DISORDER: ICD-10-CM

## 2025-02-14 DIAGNOSIS — Z13.1 SCREENING FOR DIABETES MELLITUS: ICD-10-CM

## 2025-02-14 DIAGNOSIS — H54.40 BLINDNESS OF LEFT EYE WITH NORMAL VISION IN CONTRALATERAL EYE: ICD-10-CM

## 2025-02-14 DIAGNOSIS — Z23 NEED FOR VACCINATION: ICD-10-CM

## 2025-02-14 PROCEDURE — 90471 IMMUNIZATION ADMIN: CPT

## 2025-02-14 PROCEDURE — 99213 OFFICE O/P EST LOW 20 MIN: CPT | Performed by: INTERNAL MEDICINE

## 2025-02-14 PROCEDURE — 3078F DIAST BP <80 MM HG: CPT | Performed by: INTERNAL MEDICINE

## 2025-02-14 PROCEDURE — 99214 OFFICE O/P EST MOD 30 MIN: CPT | Performed by: INTERNAL MEDICINE

## 2025-02-14 PROCEDURE — 3074F SYST BP LT 130 MM HG: CPT | Performed by: INTERNAL MEDICINE

## 2025-02-15 NOTE — PATIENT INSTRUCTIONS
[facility]     Social Security Office  [address]  [phone]  [phone]

## 2025-02-18 ENCOUNTER — HOSPITAL ENCOUNTER (OUTPATIENT)
Dept: LAB | Facility: MEDICAL CENTER | Age: 55
End: 2025-02-18
Attending: INTERNAL MEDICINE
Payer: MEDICAID

## 2025-02-18 DIAGNOSIS — Z13.220 SCREENING, LIPID: ICD-10-CM

## 2025-02-18 DIAGNOSIS — Z13.228 SCREENING FOR METABOLIC DISORDER: ICD-10-CM

## 2025-02-18 DIAGNOSIS — Z13.1 SCREENING FOR DIABETES MELLITUS: ICD-10-CM

## 2025-02-18 LAB
EST. AVERAGE GLUCOSE BLD GHB EST-MCNC: 105 MG/DL
HBA1C MFR BLD: 5.3 % (ref 4–5.6)

## 2025-02-18 PROCEDURE — 80053 COMPREHEN METABOLIC PANEL: CPT

## 2025-02-18 PROCEDURE — 36415 COLL VENOUS BLD VENIPUNCTURE: CPT

## 2025-02-18 PROCEDURE — 80061 LIPID PANEL: CPT

## 2025-02-18 PROCEDURE — 83036 HEMOGLOBIN GLYCOSYLATED A1C: CPT
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
## 2025-02-18 NOTE — ASSESSMENT & PLAN NOTE
Patient reports that his right eye is starting to have more blurred vision.  He has been using some readers from the [employer] which help a little bit but he is hoping to get a formal eye evaluation for glasses.
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
## 2025-02-19 LAB
ALBUMIN SERPL BCP-MCNC: 3.9 G/DL (ref 3.2–4.9)
ALBUMIN/GLOB SERPL: 1.2 G/DL
ALP SERPL-CCNC: 74 U/L (ref 30–99)
ALT SERPL-CCNC: 110 U/L (ref 2–50)
ANION GAP SERPL CALC-SCNC: 10 MMOL/L (ref 7–16)
AST SERPL-CCNC: 125 U/L (ref 12–45)
BILIRUB SERPL-MCNC: 0.5 MG/DL (ref 0.1–1.5)
BUN SERPL-MCNC: 11 MG/DL (ref 8–22)
CALCIUM ALBUM COR SERPL-MCNC: 9.4 MG/DL (ref 8.5–10.5)
CALCIUM SERPL-MCNC: 9.3 MG/DL (ref 8.5–10.5)
CHLORIDE SERPL-SCNC: 97 MMOL/L (ref 96–112)
CHOLEST SERPL-MCNC: 139 MG/DL (ref 100–199)
CO2 SERPL-SCNC: 30 MMOL/L (ref 20–33)
CREAT SERPL-MCNC: 0.66 MG/DL (ref 0.5–1.4)
FASTING STATUS PATIENT QL REPORTED: NORMAL
GFR SERPLBLD CREATININE-BSD FMLA CKD-EPI: 109 ML/MIN/1.73 M 2
GLOBULIN SER CALC-MCNC: 3.2 G/DL (ref 1.9–3.5)
GLUCOSE SERPL-MCNC: 82 MG/DL (ref 65–99)
HDLC SERPL-MCNC: 48 MG/DL
LDLC SERPL CALC-MCNC: 76 MG/DL
POTASSIUM SERPL-SCNC: 4.6 MMOL/L (ref 3.6–5.5)
PROT SERPL-MCNC: 7.1 G/DL (ref 6–8.2)
SODIUM SERPL-SCNC: 137 MMOL/L (ref 135–145)
TRIGL SERPL-MCNC: 77 MG/DL (ref 0–149)

## 2025-02-20 NOTE — CARE PLAN
Problem: Pain - Standard  Goal: Alleviation of pain or a reduction in pain to the patient’s comfort goal  Outcome: Progressing     Problem: Psychosocial  Goal: Patient's ability to identify and develop effective coping behaviors will improve  Outcome: Progressing  Goal: Patient's ability to identify and utilize available support systems will improve  Outcome: Progressing     Problem: Psychosocial  Goal: Patient's level of anxiety will decrease  Outcome: Progressing  Goal: Patient's ability to verbalize feelings about condition will improve  Outcome: Progressing  Goal: Patient's ability to re-evaluate and adapt role responsibilities will improve  Outcome: Progressing  Goal: Patient and family will demonstrate ability to cope with life altering diagnosis and/or procedure  Outcome: Progressing  Goal: Spiritual and cultural needs incorporated into hospitalization  Outcome: Progressing     Problem: Communication  Goal: The ability to communicate needs accurately and effectively will improve  Outcome: Progressing     Problem: Respiratory  Goal: Patient will achieve/maintain optimum respiratory ventilation and gas exchange  Outcome: Progressing     Problem: Mobility  Goal: Patient's capacity to carry out activities will improve  Outcome: Progressing     Problem: Depression  Goal: Patient and family/caregiver will verbalize accurate information about at least two of the possible causes of depression, three-four of the signs and symptoms of depression  Outcome: Progressing   The patient is Stable - Low risk of patient condition declining or worsening    Shift Goals  Clinical Goals: pain control, ambulation with staff  Patient Goals: pain control, ambulation  Family Goals: Update on plan of care    Progress made toward(s) clinical / shift goals:  pain control, speaking valve trial    Patient is not progressing towards the following goals:       Bed: 11H  Expected date:   Expected time:   Means of arrival:   Comments:  triage

## 2025-03-18 ENCOUNTER — RESULTS FOLLOW-UP (OUTPATIENT)
Dept: MEDICAL GROUP | Facility: MEDICAL CENTER | Age: 55
End: 2025-03-18

## (undated) DEVICE — PROTECTOR ULNA NERVE - (36PR/CA)

## (undated) DEVICE — Device

## (undated) DEVICE — SYRINGE 50ML CATHETER TIP (40EA/BX 4BX/CA)

## (undated) DEVICE — GLOVE SZ 7 BIOGEL PI MICRO - PF LF (50PR/BX 4BX/CA)

## (undated) DEVICE — SENSOR SPO2 NEO LNCS ADHESIVE (20/BX) SEE USER NOTES

## (undated) DEVICE — TUBE CONNECTING SUCTION - CLEAR PLASTIC STERILE 72 IN (50EA/CA)

## (undated) DEVICE — SYRINGE 10 ML CONTROL LL (25EA/BX 4BX/CA)

## (undated) DEVICE — TROCAR5X55 KII SHIELDED SYS - (6/BX)

## (undated) DEVICE — SUTURE 3-0 CHROMIC GUT SH 27 (36PK/BX)"

## (undated) DEVICE — TOWEL STOP TIMEOUT SAFETY FLAG (40EA/CA)

## (undated) DEVICE — GLOVE BIOGEL SZ 7.5 SURGICAL PF LTX - (50PR/BX 4BX/CA)

## (undated) DEVICE — GOWN WARMING STANDARD FLEX - (30/CA)

## (undated) DEVICE — DRAPE SURGICAL U 77X120 - (10/CA)

## (undated) DEVICE — DRESSING TEGADERM 8 X 12 - (10/BX 8BX/CA)

## (undated) DEVICE — KIT ANESTHESIA W/CIRCUIT & 3/LT BAG W/FILTER (20EA/CA)

## (undated) DEVICE — GLOVE BIOGEL PI INDICATOR SZ 6.5 SURGICAL PF LF - (50/BX 4BX/CA)

## (undated) DEVICE — GOWN SURGEONS LARGE - (32/CA)

## (undated) DEVICE — STAPLER SKIN DISP - (6/BX 10BX/CA) VISISTAT

## (undated) DEVICE — SET EXTENSION WITH 2 PORTS (48EA/CA) ***PART #2C8610 IS A SUBSTITUTE*****

## (undated) DEVICE — TRAY SRGPRP PVP IOD WT PRP - (20/CA)

## (undated) DEVICE — KIT  I.V. START (100EA/CA)

## (undated) DEVICE — SET LEADWIRE 5 LEAD BEDSIDE DISPOSABLE ECG (1SET OF 5/EA)

## (undated) DEVICE — HEAD HOLDER JUNIOR/ADULT

## (undated) DEVICE — NEPTUNE 4 PORT MANIFOLD - (20/PK)

## (undated) DEVICE — CANISTER SUCTION RIGID RED 1500CC (40EA/CA)

## (undated) DEVICE — SUTURE PLASTIC

## (undated) DEVICE — CANISTER SUCTION 3000ML MECHANICAL FILTER AUTO SHUTOFF MEDI-VAC NONSTERILE LF DISP  (40EA/CA)

## (undated) DEVICE — SET TUBING PNEUMOCLEAR HIGH FLOW SMOKE EVACUATION (10EA/BX)

## (undated) DEVICE — GLOVE BIOGEL INDICATOR SZ 6.5 SURGICAL PF LTX - (50PR/BX 4BX/CA)

## (undated) DEVICE — SUCTION INSTRUMENT YANKAUER BULBOUS TIP W/O VENT (50EA/CA)

## (undated) DEVICE — DEPRESSOR TONGUE ADLT STERILE - 6 IN (100EA/BX)

## (undated) DEVICE — PENCIL ELECTSURG 10FT HLSTR - WITH BLADE (50EA/CA)

## (undated) DEVICE — SUTURE 4-0 VICRYL PLUS FS-2 - 27 INCH (36/BX)

## (undated) DEVICE — SODIUM CHL IRRIGATION 0.9% 1000ML (12EA/CA)

## (undated) DEVICE — PACK ENT OR - (2EA/CA)

## (undated) DEVICE — GLOVE BIOGEL PI INDICATOR SZ 7.5 SURGICAL PF LF -(50/BX 4BX/CA)

## (undated) DEVICE — ELECTRODE DUAL RETURN W/ CORD - (50/PK)

## (undated) DEVICE — TUBE E-T HI-LO CUFF 7.0MM (10EA/PK)

## (undated) DEVICE — CHLORAPREP 26 ML APPLICATOR - ORANGE TINT(25/CA)

## (undated) DEVICE — TUBING CLEARLINK DUO-VENT - C-FLO (48EA/CA)

## (undated) DEVICE — PACK MINOR BASIN - (2EA/CA)

## (undated) DEVICE — GLOVE BIOGEL SZ 6.5 SURGICAL PF LTX (50PR/BX 4BX/CA)

## (undated) DEVICE — WASH BABY HEAD-TO-TOE 1.7 OZ. - (144EA/CA)

## (undated) DEVICE — BURR EGG 4.0 ORAL

## (undated) DEVICE — LACTATED RINGERS INJ 1000 ML - (14EA/CA 60CA/PF)

## (undated) DEVICE — APPLIER OPEN MEDIUM CLIP (6EA/BX)

## (undated) DEVICE — GLOVE BIOGEL PI ORTHO SZ 7.5 PF LF (40PR/BX)

## (undated) DEVICE — NEEDLE INSFL 120MM 14GA VRRS - (20/BX)

## (undated) DEVICE — SUTURE 5-0 VICRYL PLUS P-3 18 (36PK/BX)"

## (undated) DEVICE — TUBING INSUFFLATION PNEUMOCLEAR HIGH-FLOW (10EA/BX)

## (undated) DEVICE — CATHETER IV 20 GA X 1-1/4 ---SURG.& SDS ONLY--- (50EA/BX)

## (undated) DEVICE — SPONGE GAUZESTER 4 X 4 4PLY - (128PK/CA)

## (undated) DEVICE — BLADE SURGICAL #15 - (50/BX 3BX/CA)

## (undated) DEVICE — PACK LAP CHOLE OR - (2EA/CA)

## (undated) DEVICE — MASK ANESTHESIA ADULT  - (100/CA)

## (undated) DEVICE — BOVIE NEEDLE TIP 3CM COLORADO

## (undated) DEVICE — GVL 3 STAT DISPOSABLE - (10/BX)

## (undated) DEVICE — SLEEVE, VASO, THIGH, MED

## (undated) DEVICE — BLADE DERMATOME NEW AIR (10/BX)

## (undated) DEVICE — ELECTRODE 850 FOAM ADHESIVE - HYDROGEL RADIOTRNSPRNT (50/PK)

## (undated) DEVICE — WATER IRRIGATION STERILE 1000ML (12EA/CA)

## (undated) DEVICE — CANNULA W/ SUPPLY TUBING O2 - (50/CA)

## (undated) DEVICE — BOVIE BLADE COATED - (50/PK)

## (undated) DEVICE — PROTECTOR CORNEAL - (10/BX)

## (undated) DEVICE — BOVIE NEEDLE TIP INSULATD NON-SAFETY 2CM (50/PK)

## (undated) DEVICE — SENSOR OXIMETER ADULT SPO2 RD SET (20EA/BX)

## (undated) DEVICE — TUBE GASTROSTOMY 18FR 20CC

## (undated) DEVICE — SUTURE GENERAL

## (undated) DEVICE — GLOVE SZ 6.5 BIOGEL PI MICRO - PF LF (50PR/BX)

## (undated) DEVICE — DRAIN J-VAC 10MM FLAT - (10/CA)

## (undated) DEVICE — DISSECTOR FINE CURVED JAW VESSEL SEALER 21CM 40DEG (6EA/CA)

## (undated) DEVICE — SUTURE 5-0 PLAIN GUT PC-1 - (12/BX)

## (undated) DEVICE — SUTURE 4-0 CHROMIC FS-2 (36EA/BX)

## (undated) DEVICE — RESERVOIR SUCTION 100 CC - SILICONE (20EA/CA)

## (undated) DEVICE — KIT 18FR INITIAL PLACEMENT - (1/CA)

## (undated) DEVICE — DERMACARRIER 8 (NEW MESHGFT) - (10/BX)

## (undated) DEVICE — DRESSING PETROLEUM GAUZE 5 X 9" (50EA/BX 4BX/CA)"

## (undated) DEVICE — MASK OXYGEN VNYL ADLT MED CONC WITH 7 FOOT TUBING  - (50EA/CA)

## (undated) DEVICE — SLEEVE VASO CALF MED - (10PR/CA)